# Patient Record
Sex: FEMALE | Race: WHITE | NOT HISPANIC OR LATINO | Employment: OTHER | URBAN - METROPOLITAN AREA
[De-identification: names, ages, dates, MRNs, and addresses within clinical notes are randomized per-mention and may not be internally consistent; named-entity substitution may affect disease eponyms.]

---

## 2017-05-22 ENCOUNTER — RECORDS - HEALTHEAST (OUTPATIENT)
Dept: LAB | Facility: CLINIC | Age: 62
End: 2017-05-22

## 2017-05-22 LAB
CHOLEST SERPL-MCNC: 234 MG/DL
FASTING STATUS PATIENT QL REPORTED: ABNORMAL
HDLC SERPL-MCNC: 49 MG/DL
LDLC SERPL CALC-MCNC: 148 MG/DL
TRIGL SERPL-MCNC: 187 MG/DL

## 2018-08-29 ENCOUNTER — RECORDS - HEALTHEAST (OUTPATIENT)
Dept: LAB | Facility: CLINIC | Age: 63
End: 2018-08-29

## 2018-08-29 LAB
ALBUMIN SERPL-MCNC: 3.2 G/DL (ref 3.5–5)
ALP SERPL-CCNC: 83 U/L (ref 45–120)
ALT SERPL W P-5'-P-CCNC: 16 U/L (ref 0–45)
ANION GAP SERPL CALCULATED.3IONS-SCNC: 7 MMOL/L (ref 5–18)
AST SERPL W P-5'-P-CCNC: 15 U/L (ref 0–40)
BILIRUB SERPL-MCNC: 0.3 MG/DL (ref 0–1)
BUN SERPL-MCNC: 18 MG/DL (ref 8–22)
CALCIUM SERPL-MCNC: 9.3 MG/DL (ref 8.5–10.5)
CHLORIDE BLD-SCNC: 110 MMOL/L (ref 98–107)
CHOLEST SERPL-MCNC: 215 MG/DL
CO2 SERPL-SCNC: 25 MMOL/L (ref 22–31)
CREAT SERPL-MCNC: 0.95 MG/DL (ref 0.6–1.1)
ERYTHROCYTE [DISTWIDTH] IN BLOOD BY AUTOMATED COUNT: 14.6 % (ref 11–14.5)
FASTING STATUS PATIENT QL REPORTED: ABNORMAL
GFR SERPL CREATININE-BSD FRML MDRD: 59 ML/MIN/1.73M2
GLUCOSE BLD-MCNC: 133 MG/DL (ref 70–125)
HCT VFR BLD AUTO: 41.9 % (ref 35–47)
HDLC SERPL-MCNC: 47 MG/DL
HGB BLD-MCNC: 13.8 G/DL (ref 12–16)
LDLC SERPL CALC-MCNC: 139 MG/DL
MCH RBC QN AUTO: 30 PG (ref 27–34)
MCHC RBC AUTO-ENTMCNC: 32.9 G/DL (ref 32–36)
MCV RBC AUTO: 91 FL (ref 80–100)
PLATELET # BLD AUTO: 341 THOU/UL (ref 140–440)
PMV BLD AUTO: 10.7 FL (ref 8.5–12.5)
POTASSIUM BLD-SCNC: 4 MMOL/L (ref 3.5–5)
PROT SERPL-MCNC: 6.4 G/DL (ref 6–8)
RBC # BLD AUTO: 4.6 MILL/UL (ref 3.8–5.4)
SODIUM SERPL-SCNC: 142 MMOL/L (ref 136–145)
TRIGL SERPL-MCNC: 143 MG/DL
TSH SERPL DL<=0.005 MIU/L-ACNC: 0.8 UIU/ML (ref 0.3–5)
WBC: 8.7 THOU/UL (ref 4–11)

## 2019-09-25 ENCOUNTER — RECORDS - HEALTHEAST (OUTPATIENT)
Dept: LAB | Facility: CLINIC | Age: 64
End: 2019-09-25

## 2019-09-25 LAB
ALBUMIN SERPL-MCNC: 3.2 G/DL (ref 3.5–5)
ALP SERPL-CCNC: 84 U/L (ref 45–120)
ALT SERPL W P-5'-P-CCNC: 22 U/L (ref 0–45)
ANION GAP SERPL CALCULATED.3IONS-SCNC: 7 MMOL/L (ref 5–18)
AST SERPL W P-5'-P-CCNC: 18 U/L (ref 0–40)
BILIRUB SERPL-MCNC: 0.3 MG/DL (ref 0–1)
BUN SERPL-MCNC: 18 MG/DL (ref 8–22)
CALCIUM SERPL-MCNC: 8.9 MG/DL (ref 8.5–10.5)
CHLORIDE BLD-SCNC: 109 MMOL/L (ref 98–107)
CHOLEST SERPL-MCNC: 212 MG/DL
CO2 SERPL-SCNC: 24 MMOL/L (ref 22–31)
CREAT SERPL-MCNC: 1.04 MG/DL (ref 0.6–1.1)
FASTING STATUS PATIENT QL REPORTED: ABNORMAL
GFR SERPL CREATININE-BSD FRML MDRD: 53 ML/MIN/1.73M2
GLUCOSE BLD-MCNC: 97 MG/DL (ref 70–125)
HDLC SERPL-MCNC: 55 MG/DL
LDLC SERPL CALC-MCNC: 130 MG/DL
POTASSIUM BLD-SCNC: 4 MMOL/L (ref 3.5–5)
PROT SERPL-MCNC: 6.2 G/DL (ref 6–8)
SODIUM SERPL-SCNC: 140 MMOL/L (ref 136–145)
TRIGL SERPL-MCNC: 137 MG/DL
TSH SERPL DL<=0.005 MIU/L-ACNC: 1.38 UIU/ML (ref 0.3–5)

## 2019-09-26 LAB — 25(OH)D3 SERPL-MCNC: 47.3 NG/ML (ref 30–80)

## 2020-11-19 ENCOUNTER — RECORDS - HEALTHEAST (OUTPATIENT)
Dept: LAB | Facility: CLINIC | Age: 65
End: 2020-11-19

## 2020-11-19 LAB
ALBUMIN SERPL-MCNC: 3.3 G/DL (ref 3.5–5)
ALP SERPL-CCNC: 73 U/L (ref 45–120)
ALT SERPL W P-5'-P-CCNC: 17 U/L (ref 0–45)
ANION GAP SERPL CALCULATED.3IONS-SCNC: 8 MMOL/L (ref 5–18)
AST SERPL W P-5'-P-CCNC: 17 U/L (ref 0–40)
BILIRUB SERPL-MCNC: 0.3 MG/DL (ref 0–1)
BUN SERPL-MCNC: 19 MG/DL (ref 8–22)
CALCIUM SERPL-MCNC: 8.6 MG/DL (ref 8.5–10.5)
CHLORIDE BLD-SCNC: 110 MMOL/L (ref 98–107)
CHOLEST SERPL-MCNC: 210 MG/DL
CO2 SERPL-SCNC: 24 MMOL/L (ref 22–31)
CREAT SERPL-MCNC: 1.15 MG/DL (ref 0.6–1.1)
ERYTHROCYTE [DISTWIDTH] IN BLOOD BY AUTOMATED COUNT: 19.2 % (ref 11–14.5)
FASTING STATUS PATIENT QL REPORTED: ABNORMAL
GFR SERPL CREATININE-BSD FRML MDRD: 47 ML/MIN/1.73M2
GLUCOSE BLD-MCNC: 98 MG/DL (ref 70–125)
HCT VFR BLD AUTO: 30.5 % (ref 35–47)
HDLC SERPL-MCNC: 55 MG/DL
HGB BLD-MCNC: 8.6 G/DL (ref 12–16)
LDLC SERPL CALC-MCNC: 128 MG/DL
MCH RBC QN AUTO: 20.8 PG (ref 27–34)
MCHC RBC AUTO-ENTMCNC: 28.2 G/DL (ref 32–36)
MCV RBC AUTO: 74 FL (ref 80–100)
PLATELET # BLD AUTO: 520 THOU/UL (ref 140–440)
PMV BLD AUTO: 10 FL (ref 8.5–12.5)
POTASSIUM BLD-SCNC: 4 MMOL/L (ref 3.5–5)
PROT SERPL-MCNC: 6.5 G/DL (ref 6–8)
RBC # BLD AUTO: 4.14 MILL/UL (ref 3.8–5.4)
SODIUM SERPL-SCNC: 142 MMOL/L (ref 136–145)
T4 FREE SERPL-MCNC: 1.1 NG/DL (ref 0.7–1.8)
TRIGL SERPL-MCNC: 135 MG/DL
TSH SERPL DL<=0.005 MIU/L-ACNC: 0.21 UIU/ML (ref 0.3–5)
WBC: 10.1 THOU/UL (ref 4–11)

## 2020-11-27 ENCOUNTER — RECORDS - HEALTHEAST (OUTPATIENT)
Dept: LAB | Facility: CLINIC | Age: 65
End: 2020-11-27

## 2020-11-27 LAB
FERRITIN SERPL-MCNC: 3 NG/ML (ref 10–130)
IRON SATN MFR SERPL: 3 % (ref 20–50)
IRON SERPL-MCNC: 14 UG/DL (ref 42–175)
RETICS # AUTO: 0.07 MILL/UL (ref 0.01–0.11)
RETICS/RBC NFR AUTO: 1.74 % (ref 0.8–2.7)
TIBC SERPL-MCNC: 436 UG/DL (ref 313–563)
TRANSFERRIN SERPL-MCNC: 349 MG/DL (ref 212–360)
VIT B12 SERPL-MCNC: 253 PG/ML (ref 213–816)

## 2020-11-28 LAB
BASOPHILS # BLD AUTO: 0.1 THOU/UL (ref 0–0.2)
BASOPHILS NFR BLD AUTO: 1 % (ref 0–2)
EOSINOPHIL COUNT (ABSOLUTE): 0.1 THOU/UL (ref 0–0.4)
EOSINOPHIL NFR BLD AUTO: 1 % (ref 0–6)
ERYTHROCYTE [DISTWIDTH] IN BLOOD BY AUTOMATED COUNT: 18.6 % (ref 11–14.5)
HCT VFR BLD AUTO: 29.4 % (ref 35–47)
HGB BLD-MCNC: 8.6 G/DL (ref 12–16)
IMMATURE GRANULOCYTE % - MAN (DIFF): 0 %
IMMATURE GRANULOCYTE ABSOLUTE - MAN (DIFF): 0 THOU/UL
LYMPHOCYTES # BLD AUTO: 3.4 THOU/UL (ref 0.8–4.4)
LYMPHOCYTES NFR BLD AUTO: 34 % (ref 20–40)
MCH RBC QN AUTO: 21 PG (ref 27–34)
MCHC RBC AUTO-ENTMCNC: 29.3 G/DL (ref 32–36)
MCV RBC AUTO: 72 FL (ref 80–100)
MONOCYTES # BLD AUTO: 0.7 THOU/UL (ref 0–0.9)
MONOCYTES NFR BLD AUTO: 7 % (ref 2–10)
OVALOCYTES: ABNORMAL
PATH REPORT.MICROSCOPIC SPEC OTHER STN: ABNORMAL
PLAT MORPH BLD: ABNORMAL
PLATELET # BLD AUTO: 484 THOU/UL (ref 140–440)
PMV BLD AUTO: 10 FL (ref 8.5–12.5)
POLYCHROMASIA BLD QL SMEAR: ABNORMAL
RBC # BLD AUTO: 4.09 MILL/UL (ref 3.8–5.4)
TOTAL NEUTROPHILS-ABS(DIFF): 5.7 THOU/UL (ref 2–7.7)
TOTAL NEUTROPHILS-REL(DIFF): 57 % (ref 50–70)
WBC: 10 THOU/UL (ref 4–11)

## 2020-11-30 LAB
LAB AP CHARGES (HE HISTORICAL CONVERSION): NORMAL
PATH REPORT.COMMENTS IMP SPEC: NORMAL
PATH REPORT.COMMENTS IMP SPEC: NORMAL
PATH REPORT.FINAL DX SPEC: NORMAL
PATH REPORT.MICROSCOPIC SPEC OTHER STN: NORMAL
PATH REPORT.RELEVANT HX SPEC: NORMAL

## 2020-12-22 ENCOUNTER — RECORDS - HEALTHEAST (OUTPATIENT)
Dept: LAB | Facility: CLINIC | Age: 65
End: 2020-12-22

## 2020-12-22 LAB
ALBUMIN SERPL-MCNC: 3.4 G/DL (ref 3.5–5)
ALP SERPL-CCNC: 75 U/L (ref 45–120)
ALT SERPL W P-5'-P-CCNC: 16 U/L (ref 0–45)
ANION GAP SERPL CALCULATED.3IONS-SCNC: 10 MMOL/L (ref 5–18)
AST SERPL W P-5'-P-CCNC: 17 U/L (ref 0–40)
BILIRUB SERPL-MCNC: 0.4 MG/DL (ref 0–1)
BUN SERPL-MCNC: 18 MG/DL (ref 8–22)
CALCIUM SERPL-MCNC: 9 MG/DL (ref 8.5–10.5)
CHLORIDE BLD-SCNC: 108 MMOL/L (ref 98–107)
CO2 SERPL-SCNC: 23 MMOL/L (ref 22–31)
CREAT SERPL-MCNC: 1.21 MG/DL (ref 0.6–1.1)
ERYTHROCYTE [DISTWIDTH] IN BLOOD BY AUTOMATED COUNT: 25.4 % (ref 11–14.5)
GFR SERPL CREATININE-BSD FRML MDRD: 45 ML/MIN/1.73M2
GLUCOSE BLD-MCNC: 86 MG/DL (ref 70–125)
HCT VFR BLD AUTO: 33.9 % (ref 35–47)
HGB BLD-MCNC: 9.5 G/DL (ref 12–16)
MCH RBC QN AUTO: 21.2 PG (ref 27–34)
MCHC RBC AUTO-ENTMCNC: 28 G/DL (ref 32–36)
MCV RBC AUTO: 76 FL (ref 80–100)
PLATELET # BLD AUTO: 404 THOU/UL (ref 140–440)
PMV BLD AUTO: 10.5 FL (ref 8.5–12.5)
POTASSIUM BLD-SCNC: 4.2 MMOL/L (ref 3.5–5)
PROT SERPL-MCNC: 6.7 G/DL (ref 6–8)
RBC # BLD AUTO: 4.48 MILL/UL (ref 3.8–5.4)
SODIUM SERPL-SCNC: 141 MMOL/L (ref 136–145)
TSH SERPL DL<=0.005 MIU/L-ACNC: 2.49 UIU/ML (ref 0.3–5)
WBC: 9.2 THOU/UL (ref 4–11)

## 2021-01-07 ENCOUNTER — RECORDS - HEALTHEAST (OUTPATIENT)
Dept: LAB | Facility: CLINIC | Age: 66
End: 2021-01-07

## 2021-01-07 LAB
ALBUMIN SERPL-MCNC: 3.6 G/DL (ref 3.5–5)
ALP SERPL-CCNC: 82 U/L (ref 45–120)
ALT SERPL W P-5'-P-CCNC: 21 U/L (ref 0–45)
ANION GAP SERPL CALCULATED.3IONS-SCNC: 8 MMOL/L (ref 5–18)
AST SERPL W P-5'-P-CCNC: 21 U/L (ref 0–40)
BASOPHILS # BLD AUTO: 0.1 THOU/UL (ref 0–0.2)
BASOPHILS NFR BLD AUTO: 1 % (ref 0–2)
BILIRUB SERPL-MCNC: 0.3 MG/DL (ref 0–1)
BUN SERPL-MCNC: 19 MG/DL (ref 8–22)
CALCIUM SERPL-MCNC: 9.1 MG/DL (ref 8.5–10.5)
CHLORIDE BLD-SCNC: 109 MMOL/L (ref 98–107)
CO2 SERPL-SCNC: 26 MMOL/L (ref 22–31)
CREAT SERPL-MCNC: 1.17 MG/DL (ref 0.6–1.1)
EOSINOPHIL # BLD AUTO: 0.4 THOU/UL (ref 0–0.4)
EOSINOPHIL NFR BLD AUTO: 5 % (ref 0–6)
ERYTHROCYTE [DISTWIDTH] IN BLOOD BY AUTOMATED COUNT: ABNORMAL %
GFR SERPL CREATININE-BSD FRML MDRD: 46 ML/MIN/1.73M2
GLUCOSE BLD-MCNC: 95 MG/DL (ref 70–125)
HCT VFR BLD AUTO: 43.6 % (ref 35–47)
HGB BLD-MCNC: 12.5 G/DL (ref 12–16)
IMM GRANULOCYTES # BLD: 0 THOU/UL
IMM GRANULOCYTES NFR BLD: 0 %
LYMPHOCYTES # BLD AUTO: 3.2 THOU/UL (ref 0.8–4.4)
LYMPHOCYTES NFR BLD AUTO: 41 % (ref 20–40)
MCH RBC QN AUTO: 23.1 PG (ref 27–34)
MCHC RBC AUTO-ENTMCNC: 28.7 G/DL (ref 32–36)
MCV RBC AUTO: 81 FL (ref 80–100)
MONOCYTES # BLD AUTO: 0.6 THOU/UL (ref 0–0.9)
MONOCYTES NFR BLD AUTO: 7 % (ref 2–10)
NEUTROPHILS # BLD AUTO: 3.5 THOU/UL (ref 2–7.7)
NEUTROPHILS NFR BLD AUTO: 46 % (ref 50–70)
OVALOCYTES: ABNORMAL
PLAT MORPH BLD: NORMAL
PLATELET # BLD AUTO: 397 THOU/UL (ref 140–440)
PMV BLD AUTO: 10.7 FL (ref 8.5–12.5)
POLYCHROMASIA BLD QL SMEAR: ABNORMAL
POTASSIUM BLD-SCNC: 4.1 MMOL/L (ref 3.5–5)
PROT SERPL-MCNC: 7 G/DL (ref 6–8)
RBC # BLD AUTO: 5.41 MILL/UL (ref 3.8–5.4)
SODIUM SERPL-SCNC: 143 MMOL/L (ref 136–145)
WBC: 7.7 THOU/UL (ref 4–11)

## 2021-02-03 ENCOUNTER — RECORDS - HEALTHEAST (OUTPATIENT)
Dept: LAB | Facility: CLINIC | Age: 66
End: 2021-02-03

## 2021-02-03 ENCOUNTER — TRANSFERRED RECORDS (OUTPATIENT)
Dept: HEALTH INFORMATION MANAGEMENT | Facility: CLINIC | Age: 66
End: 2021-02-03

## 2021-02-03 LAB
C REACTIVE PROTEIN LHE: 2.1 MG/DL (ref 0–0.8)
ERYTHROCYTE [SEDIMENTATION RATE] IN BLOOD BY WESTERGREN METHOD: 13 MM/HR (ref 0–20)

## 2021-02-06 ENCOUNTER — APPOINTMENT (OUTPATIENT)
Dept: ULTRASOUND IMAGING | Facility: CLINIC | Age: 66
DRG: 243 | End: 2021-02-06
Attending: INTERNAL MEDICINE
Payer: COMMERCIAL

## 2021-02-06 ENCOUNTER — COMMUNICATION - HEALTHEAST (OUTPATIENT)
Dept: SCHEDULING | Facility: CLINIC | Age: 66
End: 2021-02-06

## 2021-02-06 ENCOUNTER — APPOINTMENT (OUTPATIENT)
Dept: CARDIOLOGY | Facility: CLINIC | Age: 66
DRG: 243 | End: 2021-02-06
Attending: INTERNAL MEDICINE
Payer: COMMERCIAL

## 2021-02-06 ENCOUNTER — HOSPITAL ENCOUNTER (INPATIENT)
Facility: CLINIC | Age: 66
LOS: 2 days | Discharge: HOME | DRG: 243 | End: 2021-02-09
Attending: INTERNAL MEDICINE | Admitting: INTERNAL MEDICINE
Payer: COMMERCIAL

## 2021-02-06 DIAGNOSIS — I10 HYPERTENSION, UNSPECIFIED TYPE: ICD-10-CM

## 2021-02-06 DIAGNOSIS — R00.1 BRADYCARDIA: Primary | ICD-10-CM

## 2021-02-06 LAB
ANION GAP SERPL CALCULATED.3IONS-SCNC: 7 MMOL/L (ref 3–14)
BUN SERPL-MCNC: 16 MG/DL (ref 7–30)
CALCIUM SERPL-MCNC: 9.1 MG/DL (ref 8.5–10.1)
CHLORIDE SERPL-SCNC: 111 MMOL/L (ref 94–109)
CO2 SERPL-SCNC: 24 MMOL/L (ref 20–32)
CREAT SERPL-MCNC: 1.1 MG/DL (ref 0.52–1.04)
ERYTHROCYTE [DISTWIDTH] IN BLOOD BY AUTOMATED COUNT: 24 % (ref 10–15)
GFR SERPL CREATININE-BSD FRML MDRD: 52 ML/MIN/{1.73_M2}
GLUCOSE SERPL-MCNC: 106 MG/DL (ref 70–99)
HCT VFR BLD AUTO: 43.9 % (ref 35–47)
HGB BLD-MCNC: 13.6 G/DL (ref 11.7–15.7)
MAGNESIUM SERPL-MCNC: 2.6 MG/DL (ref 1.6–2.3)
MCH RBC QN AUTO: 25.6 PG (ref 26.5–33)
MCHC RBC AUTO-ENTMCNC: 31 G/DL (ref 31.5–36.5)
MCV RBC AUTO: 83 FL (ref 78–100)
PLATELET # BLD AUTO: 383 10E9/L (ref 150–450)
POTASSIUM SERPL-SCNC: 4 MMOL/L (ref 3.4–5.3)
RBC # BLD AUTO: 5.32 10E12/L (ref 3.8–5.2)
SODIUM SERPL-SCNC: 142 MMOL/L (ref 133–144)
T4 FREE SERPL-MCNC: 0.79 NG/DL (ref 0.76–1.46)
TSH SERPL DL<=0.005 MIU/L-ACNC: 4.73 MU/L (ref 0.4–4)
WBC # BLD AUTO: 10.9 10E9/L (ref 4–11)

## 2021-02-06 PROCEDURE — 85027 COMPLETE CBC AUTOMATED: CPT | Performed by: INTERNAL MEDICINE

## 2021-02-06 PROCEDURE — 36415 COLL VENOUS BLD VENIPUNCTURE: CPT | Performed by: INTERNAL MEDICINE

## 2021-02-06 PROCEDURE — G0378 HOSPITAL OBSERVATION PER HR: HCPCS

## 2021-02-06 PROCEDURE — 99222 1ST HOSP IP/OBS MODERATE 55: CPT | Mod: 25 | Performed by: INTERNAL MEDICINE

## 2021-02-06 PROCEDURE — 93010 ELECTROCARDIOGRAM REPORT: CPT | Performed by: INTERNAL MEDICINE

## 2021-02-06 PROCEDURE — 255N000002 HC RX 255 OP 636: Performed by: INTERNAL MEDICINE

## 2021-02-06 PROCEDURE — 250N000013 HC RX MED GY IP 250 OP 250 PS 637: Performed by: INTERNAL MEDICINE

## 2021-02-06 PROCEDURE — 999N000208 ECHOCARDIOGRAM COMPLETE

## 2021-02-06 PROCEDURE — 99220 PR INITIAL OBSERVATION CARE,LEVEL III: CPT | Performed by: INTERNAL MEDICINE

## 2021-02-06 PROCEDURE — 93975 VASCULAR STUDY: CPT

## 2021-02-06 PROCEDURE — 93005 ELECTROCARDIOGRAM TRACING: CPT

## 2021-02-06 PROCEDURE — 99207 PR NO CHARGE LOS: CPT | Performed by: INTERNAL MEDICINE

## 2021-02-06 PROCEDURE — 250N000011 HC RX IP 250 OP 636: Performed by: INTERNAL MEDICINE

## 2021-02-06 PROCEDURE — 80048 BASIC METABOLIC PNL TOTAL CA: CPT | Performed by: INTERNAL MEDICINE

## 2021-02-06 PROCEDURE — 84443 ASSAY THYROID STIM HORMONE: CPT | Performed by: INTERNAL MEDICINE

## 2021-02-06 PROCEDURE — 83735 ASSAY OF MAGNESIUM: CPT | Performed by: INTERNAL MEDICINE

## 2021-02-06 PROCEDURE — 93306 TTE W/DOPPLER COMPLETE: CPT | Mod: 26 | Performed by: INTERNAL MEDICINE

## 2021-02-06 PROCEDURE — 96374 THER/PROPH/DIAG INJ IV PUSH: CPT

## 2021-02-06 PROCEDURE — 84439 ASSAY OF FREE THYROXINE: CPT | Performed by: INTERNAL MEDICINE

## 2021-02-06 PROCEDURE — 96376 TX/PRO/DX INJ SAME DRUG ADON: CPT

## 2021-02-06 RX ORDER — NEOMYCIN/BACITRACIN/POLYMYXINB 3.5-400-5K
OINTMENT (GRAM) TOPICAL DAILY PRN
COMMUNITY
End: 2022-08-05

## 2021-02-06 RX ORDER — CHOLECALCIFEROL (VITAMIN D3) 50 MCG
1 TABLET ORAL DAILY
COMMUNITY

## 2021-02-06 RX ORDER — LANOLIN ALCOHOL/MO/W.PET/CERES
3 CREAM (GRAM) TOPICAL
Status: DISCONTINUED | OUTPATIENT
Start: 2021-02-06 | End: 2021-02-09 | Stop reason: HOSPADM

## 2021-02-06 RX ORDER — ONDANSETRON 2 MG/ML
4 INJECTION INTRAMUSCULAR; INTRAVENOUS EVERY 6 HOURS PRN
Status: DISCONTINUED | OUTPATIENT
Start: 2021-02-06 | End: 2021-02-09 | Stop reason: HOSPADM

## 2021-02-06 RX ORDER — OMEPRAZOLE 40 MG/1
40 CAPSULE, DELAYED RELEASE ORAL DAILY
COMMUNITY
End: 2023-02-21

## 2021-02-06 RX ORDER — HYDRALAZINE HYDROCHLORIDE 20 MG/ML
20 INJECTION INTRAMUSCULAR; INTRAVENOUS EVERY 4 HOURS PRN
Status: DISCONTINUED | OUTPATIENT
Start: 2021-02-06 | End: 2021-02-07

## 2021-02-06 RX ORDER — PROCHLORPERAZINE 25 MG
12.5 SUPPOSITORY, RECTAL RECTAL EVERY 12 HOURS PRN
Status: DISCONTINUED | OUTPATIENT
Start: 2021-02-06 | End: 2021-02-09 | Stop reason: HOSPADM

## 2021-02-06 RX ORDER — HYDROMORPHONE HYDROCHLORIDE 1 MG/ML
0.3 INJECTION, SOLUTION INTRAMUSCULAR; INTRAVENOUS; SUBCUTANEOUS
Status: DISCONTINUED | OUTPATIENT
Start: 2021-02-06 | End: 2021-02-09 | Stop reason: HOSPADM

## 2021-02-06 RX ORDER — LOSARTAN POTASSIUM 25 MG/1
25 TABLET ORAL DAILY
Status: ON HOLD | COMMUNITY
End: 2021-02-09

## 2021-02-06 RX ORDER — ACETAMINOPHEN 650 MG/1
650 SUPPOSITORY RECTAL EVERY 4 HOURS PRN
Status: DISCONTINUED | OUTPATIENT
Start: 2021-02-06 | End: 2021-02-09 | Stop reason: HOSPADM

## 2021-02-06 RX ORDER — CHLORHEXIDINE GLUCONATE 40 MG/ML
SOLUTION TOPICAL DAILY
Status: COMPLETED | OUTPATIENT
Start: 2021-02-07 | End: 2021-02-08

## 2021-02-06 RX ORDER — NALOXONE HYDROCHLORIDE 0.4 MG/ML
0.2 INJECTION, SOLUTION INTRAMUSCULAR; INTRAVENOUS; SUBCUTANEOUS
Status: DISCONTINUED | OUTPATIENT
Start: 2021-02-06 | End: 2021-02-09 | Stop reason: HOSPADM

## 2021-02-06 RX ORDER — HYDROCODONE BITARTRATE AND ACETAMINOPHEN 5; 325 MG/1; MG/1
1-2 TABLET ORAL EVERY 4 HOURS PRN
Status: DISCONTINUED | OUTPATIENT
Start: 2021-02-06 | End: 2021-02-08

## 2021-02-06 RX ORDER — LOSARTAN POTASSIUM 25 MG/1
25 TABLET ORAL DAILY
Status: DISCONTINUED | OUTPATIENT
Start: 2021-02-06 | End: 2021-02-07

## 2021-02-06 RX ORDER — ACETAMINOPHEN 325 MG/1
650 TABLET ORAL EVERY 4 HOURS PRN
Status: DISCONTINUED | OUTPATIENT
Start: 2021-02-06 | End: 2021-02-09 | Stop reason: HOSPADM

## 2021-02-06 RX ORDER — NALOXONE HYDROCHLORIDE 0.4 MG/ML
0.4 INJECTION, SOLUTION INTRAMUSCULAR; INTRAVENOUS; SUBCUTANEOUS
Status: DISCONTINUED | OUTPATIENT
Start: 2021-02-06 | End: 2021-02-09 | Stop reason: HOSPADM

## 2021-02-06 RX ORDER — ONDANSETRON 4 MG/1
4 TABLET, ORALLY DISINTEGRATING ORAL EVERY 6 HOURS PRN
Status: DISCONTINUED | OUTPATIENT
Start: 2021-02-06 | End: 2021-02-09 | Stop reason: HOSPADM

## 2021-02-06 RX ORDER — PROCHLORPERAZINE MALEATE 5 MG
5 TABLET ORAL EVERY 6 HOURS PRN
Status: DISCONTINUED | OUTPATIENT
Start: 2021-02-06 | End: 2021-02-09 | Stop reason: HOSPADM

## 2021-02-06 RX ORDER — LEVOTHYROXINE SODIUM 50 UG/1
75 TABLET ORAL DAILY
COMMUNITY

## 2021-02-06 RX ADMIN — OMEPRAZOLE 40 MG: 20 CAPSULE, DELAYED RELEASE ORAL at 15:51

## 2021-02-06 RX ADMIN — HUMAN ALBUMIN MICROSPHERES AND PERFLUTREN 9 ML: 10; .22 INJECTION, SOLUTION INTRAVENOUS at 08:30

## 2021-02-06 RX ADMIN — HYDRALAZINE HYDROCHLORIDE 20 MG: 20 INJECTION INTRAMUSCULAR; INTRAVENOUS at 10:31

## 2021-02-06 RX ADMIN — LOSARTAN POTASSIUM 25 MG: 25 TABLET, FILM COATED ORAL at 15:52

## 2021-02-06 RX ADMIN — HYDRALAZINE HYDROCHLORIDE 20 MG: 20 INJECTION INTRAMUSCULAR; INTRAVENOUS at 03:18

## 2021-02-06 RX ADMIN — ONDANSETRON 4 MG: 4 TABLET, ORALLY DISINTEGRATING ORAL at 11:54

## 2021-02-06 RX ADMIN — ACETAMINOPHEN 650 MG: 325 TABLET, FILM COATED ORAL at 11:51

## 2021-02-06 ASSESSMENT — ACTIVITIES OF DAILY LIVING (ADL)
DIFFICULTY_EATING/SWALLOWING: NO
DRESSING/BATHING_DIFFICULTY: NO
DOING_ERRANDS_INDEPENDENTLY_DIFFICULTY: NO
TOILETING_ISSUES: NO
CONCENTRATING,_REMEMBERING_OR_MAKING_DECISIONS_DIFFICULTY: NO
FALL_HISTORY_WITHIN_LAST_SIX_MONTHS: NO
WALKING_OR_CLIMBING_STAIRS_DIFFICULTY: NO
DIFFICULTY_COMMUNICATING: NO

## 2021-02-06 NOTE — PLAN OF CARE
Tameka is alert, oriented, very pleasant. She arrived to [facility] from [facility] ED via EMS overnight. She reports her headache has improved. BP high at admission, received PRN Hydralazine with good results.  Asymptomatic bradycardia with rates in the 30s-40s. Tele looks like 2:1 second degree AVB, type 2.  NPO for echo and cardiology/EP consult.

## 2021-02-06 NOTE — LETTER
Transition Communication Hand-off for Care Transitions to Next Level of Care Provider    Name: Tameka Avalos  : 1955  MRN #: [id number]  Primary Care Provider: Eve Brewer     Primary Clinic: [facility] [address]  [place] [address]     Reason for Hospitalization:  Bradycardia [R00.1]  Admit Date/Time: 2021  2:21 AM  Discharge Date: 2021    Payor Source: Payor: [facility] / Plan: [facility] MEDICARE NON [employer] / Product Type: HMO /         Reason for Communication Hand-off Referral: Avoidable readmission within 30 days    Discharge Plan: home.     Follow-up plan:    Future Appointments   Date Time Provider Department Center   2/15/2021  2:00 PM [facility]     Patient had permanent pacemaker placed.    Jennifer Ochoa RN    AVS/Discharge Summary is the source of truth; this is a helpful guide for improved communication of patient story

## 2021-02-06 NOTE — H&P
[facility]  History and Physical  Hospitalist       Date of Admission:  2/6/2021    Assessment & Plan   Tameka Avalos is a very pleasant 65 year old female with hypertension, hypothyroidism, GERD who was transferred from [facility] Emergency Department on 2/6/2021 with bradycardia and headache.    Bradycardia with block, prolonged QT  Appears asymptomatic without any recent syncope or falls. She tolerates water aerobics 4 times per week, sometimes even 2 classes in a single day. Heart rate 29-38 with several different rhythm variations noted at referring ED: 1st degree Wenckebach, sinus rhythm with prolonged QT at 562.  -admit to observation with telemetry  -12 lead EKG stat on admission  -EP cardiology consultation  -TTE  -atropine prn for HR<30 and symptomatic  -pacer pads available  -NO beta blockers or calcium channel blockers  -give magnesium sulfate 2 grams now    Headache  Accelerated Hypertension  Blood pressure range noted from 92 systolic to 270 with a associated headache. Hypertension managed PTA with losartan only. With wide range of pressures ? If something other than essential hypertension such as renal artery stenosis, pheochromocytoma, etc. CTA head showed no acute intracranial process.   -prn hydralazine  -renal US later today to evaluate for renal artery stenosis  -check BMP    Hypothyroidism  Managed PTA with levothyroxine. If profoundly hypothyroid, could be contributing to bradycardia.   -check TSH  -resume levothyroxine once verified    Chronic, stable problems:  GERD: Resume PTA PPI    Asymptomatic Rule Out of COVID-19 infection => NEGATIVE  This patient was evaluated during a global COVID-19 pandemic, which necessitated consideration that the patient might be at risk for infection with the SARS-CoV-2 virus that causes COVID-19. Applicable protocols for evaluation were followed during the patient's care. Low suspicion for infection.   -follow-up COVID-19 PCR test  "result    DVT prophylaxis: Pneumatic Compression Devices and Ambulate every shift  Code Status: Full     Disposition: Expected discharge in 1-2 days pending cardiology recommendations.    Zhane Zee MD  Text Page    ~~~~~~~~~~~~~~~~~~~~~~~~~~~~~~~~~~~~~~~~~~~~~~~~~~~~~  Primary Care Physician   No primary care provider on file.    Chief Complaint   headache     History is obtained from the patient and medical records.    History of Present Illness   Tameka Avalos is a very pleasant 65 year old female with hypertension, hypothyroidism, GERD who presented to an outside ED with headache. She had been suffering for 3 weeks with off & on, 8/10 intensity, associated with nausea. Attempted to treat with acetaminophen which really didn't help. Also tried taking omeprazole to see if her nausea would improve and it did not. Saw her primary clinic and was given prescription for sumatriptan which she tried 3 times but it did not help. Made an appointment with her ophthalmologist to see if there was an eye problem causing her headaches but she reports that exam was \"fine\". Reports she knows she has a heart murmur \"for a long time\" but has never had an echocardiogram. She has hypothyroidism and had her dose decreased in the past year by her primary physician.     On presentation to the ED she had systolic  and was given hydralazine then pressure went to 93 systolic. Headache improved. While on telemetry it was noted that her heart rate ranged from 30-40, during which time she felt nauseated. Several EKGs were obtained which reportedly showed 1st degree Wenckebach, sinus rhythm with prolonged QT at 562. No atropine was needed for treatment. Her only blood pressure medication is losartan and she has not unintentionally taken any beta blocker or calcium channel blocker. Denies fever, chills, hot flashes, shortness of breath, chest pain, palpitations, blurry vision, abdominal pain or flank tenderness, dysuria, " hematuria, constipation or diarrhea.     Case reviewed with Dr. Laird from the [facility] Emergency Department. Labs and available imaging reviewed. Pertinent results include: MULTIPLE EKGs. Gave dilaudid, mag sufate, zofran, IV fluid. [facility] hospitalist declined admission due to his opinion that the patient would require an ICU bed, though patient suitable for medical telemetry bed here.     Past Medical History    I have reviewed this patient's medical history and updated it with pertinent information if needed.   Hypertension  Hypothyroidism  GERD    Past Surgical History   I have reviewed this patient's surgical history and updated it with pertinent information if needed.    Prior to Admission Medications   None   losartan  Synthroid    Allergies   Not on File    Social History   I have reviewed this patient's social history and updated it with pertinent information if needed. Tameka Avalos  is retired and spends Wednesdays with her grandchildren. She is a lifelong nonsmoker and denies alcohol consumption. Does water aerobics 4 times per week.    Family History   I have reviewed this patient's family history and updated it with pertinent information if needed.   Father and mother still alive in their 90s. Dad had pacemaker placed but patient doesn't know what it was for.   No 1st degree relatives with kidney problems.     Review of Systems   The 10 point Review of Systems is negative other than noted in the HPI or here.    Physical Exam       BP: (!) 196/77 Pulse: (!) 38     SpO2: 97 % O2 Device: Nasal cannula Oxygen Delivery: 2 LPM  Vital Signs with Ranges  Pulse:  [38-41] 38  BP: (196-199)/(77-82) 196/77  SpO2:  [97 %] 97 %  0 lbs 0 oz    Constitutional: Alert, NAD, pleasant and interactive  Eyes: PERRL, sclerae anicteric  HEENT: mmm, atraumatic  To tenderness to palpation of temple areas  Respiratory: Lungs CTAB, no wheezes or crackles  Cardiovascular: bradycardic, +systolic murmur  no LE edema  GI:  soft, non-tender, nondistended  Skin/Integument: nilda complexion, warm, dry, no acute rashes  Genitourinary: not examined  Musc: FONTAINE, normal limb strength x 4  Neuro: AOx3, no focal deficits, no tremors  Psych: not anxious, not confused      Data   Data reviewed today:  I personally reviewed: 12 lead EKGs with 1st degree Wenckebach, sinus rhythm with prolonged QT at 562. Repeat EKG here with second degree AV block.  Recent Labs   Lab 02/06/21  0329   WBC 10.9   HGB 13.6   MCV 83         POTASSIUM 4.0   CHLORIDE 111*   CO2 24   BUN 16   CR 1.10*   ANIONGAP 7   BISI 9.1   *       Imaging:  No results found for this or any previous visit (from the past 24 hour(s)).

## 2021-02-06 NOTE — CONSULTS
[facility]    Cardiology Consultation     Date of Admission:  2/6/2021  Reason for consult: AV block    Assessment & Plan     1.  2-1 AV block  2.  Hypertensive urgency  3.  Hypothyroidism  4.  Essential hypertension    Plan:  1.  Keep Zoll at bedside over weekend and plan for dual-chamber pacemaker (right-handed so on the left side) on Monday.  N.p.o. Sunday after midnight.  Stable here without any syncope and no pauses on telemetry so I do not think temporary pacing is needed.  Discussed the procedures and associated risks and benefits with the patient and her , which include bleeding, hematoma, infection, pneumothorax, and less likely cardiac perforation or tamponade.  She is agreeable to proceeding.  No reversible causes identified as she has not been on AV yazmin blockers and I think a vagal from nausea/headache is very unlikely.  2.  Hibiclens bath  3.  Continue losartan and agree with secondary causes of hypertension work-up including renal ultrasound given systolic blood pressure of 270 at outside ED  4.  No QT prolongation and I suspect outside report of QT prolongation was P wave at the end of the way falsely prolonging QT.    We will continue to follow.    Víctor Kelly MD    History of Present Illness   Tameak Avalos is a 65 year old female who is admitted for AV block.  Patient has history of hypothyroidism, hypertension on losartan but no other cardiac issues.  She presented to the ED with a few weeks of headache/nausea in addition to dizziness and nausea the day of presentations.  She took omeprazole for the nausea and sumatriptan/Tylenol for the headache without relief.  She eventually presented at [facility] ED where she had a systolic blood pressure of 270 mmHg and was treated with hydralazine.  He was noted that she was bradycardic with heart rates between 30 and 40 and ECG reportedly showed first-degree Wenckebach and prolonged QT at 562.  She has not  been on any beta-blocker or calcium channel blocker.  She has denied chest pain, shortness of breath, dizziness lightheadedness syncopal events, he is pretty active at baseline and was planning for travel at the end of February in [place].  She was transferred to [facility] given recommendation of the local hospitalist for ICU bed however she has been admitted to telemetry because she has been stable here.  ECG here showed sinus rhythm with 2-1 AV block, short GA interval and narrow QRS.  P waves at the end of the T waves which may have artificially prolonged the QT.  She has not had any pauses or asystole.  Transthoracic echo has been performed and showed normal ejection fraction and no significant valve disease despite impressive murmur on exam.  TSH has been 4.7 and T4 normal.    Past Medical History   No past medical history on file.     Past Surgical History   No past surgical history on file.     Prior to Admission Medications   Prior to Admission Medications   Prescriptions Last Dose Informant Patient Reported? Taking?   ferrous sulfate 140 (45 Fe) MG TBCR CR tablet 2/5/2021 at pm  Yes Yes   Sig: Take 140 mg by mouth 2 times daily    levothyroxine (SYNTHROID/LEVOTHROID) 50 MCG tablet 2/5/2021 at am  Yes Yes   Sig: Take 50 mcg by mouth daily   losartan (COZAAR) 25 MG tablet 2/5/2021 at am  Yes Yes   Sig: Take 25 mg by mouth daily   neomycin-bacitracin-polymyxin (NEOSPORIN) 5-400-5000 ointment prn at prn  Yes Yes   Sig: Apply topically daily as needed    omeprazole (PRILOSEC) 40 MG DR capsule 2/5/2021 at am  Yes Yes   Sig: Take 40 mg by mouth daily   vitamin D3 (CHOLECALCIFEROL) 50 mcg (2000 units) tablet 2/5/2021 at am  Yes Yes   Sig: Take 1 tablet by mouth daily      Facility-Administered Medications: None     Allergies   No Known Allergies    Social History   I have reviewed this patient's social history and updated it with pertinent information if needed. Tameka Avalos      Family History   I have reviewed  this patient's family history and updated it with pertinent information if needed.   No family history on file.    Code Status    Full Code    Review of Systems   12 point review of systems reviewed and as noted in History of Present Illness    Physical Exam   Temp: 98.5  F (36.9  C)   BP: 137/59 Pulse: (!) 41   Resp: 18 SpO2: 96 % O2 Device: Nasal cannula Oxygen Delivery: 2 LPM  Vital Signs with Ranges  Temp:  [98.5  F (36.9  C)-98.7  F (37.1  C)] 98.5  F (36.9  C)  Pulse:  [38-45] 41  Resp:  [18] 18  BP: (135-203)/(59-82) 137/59  SpO2:  [96 %-97 %] 96 %  172 lbs 14.4 oz    Constitutional: Awake, alert, cooperative, no apparent distress.  Respiratory: Clear to auscultation bilaterally, no crackles or wheezing.  Neck: No JVD  Cardiovascular: Regular rate and rhythm, bradycardia normal S1 and S2, and 3 out of 6 systolic murmur throughout the precordium  Skin: No rashes, no cyanosis  Psychiatric: Alert, oriented to person, place and time, no obvious anxiety or depression.   Extremities: No lower or upper extremity edema, no clubbing or cyanosis  Vascular: Radial pulses 4+ and symmetric bilaterally    CARDIOLOGY TESTING AND IMAGING:    Transthoracic echocardiogram:   Interpretation Summary     Possible advanced AV block.  Left ventricular systolic function is normal.  The visual ejection fraction is estimated at 60-65%.  The left ventricle is normal in size.  The right ventricle is normal in structure, function and size.  There is trace to mild mitral regurgitation.     No old studies available for comparison.    Data   Most Recent 3 CBC's:  Recent Labs   Lab Test 02/06/21  0329   WBC 10.9   HGB 13.6   MCV 83        Most Recent 3 BMP's:  Recent Labs   Lab Test 02/06/21  0329      POTASSIUM 4.0   CHLORIDE 111*   CO2 24   BUN 16   CR 1.10*   ANIONGAP 7   BISI 9.1   *     Most Recent 3 Troponin's:No lab results found.  Most Recent 3 BNP's:No lab results found.  Most Recent Cholesterol Panel:No lab  results found.

## 2021-02-06 NOTE — PROGRESS NOTES
Interval Hospitalist Note    Patient is a 66yo female admitted earlier this morning per Dr. Zhane Zee for evaluation of second degree AV block and hypertension. H&P reviewed.     Additional workup ordered on admission --   Echocardiogram showed nl EF, no significant valve disease  Renal US neg for JEANNE  Labs stable (baseline Cr 1.1 per review of records through [facility])  TSH low but FT4 nl (patient said her PCP had to previously decrease dose of levothyroxine dt     BPs improved but had been given IV hydralazine a few hours earlier.     Seen by cardiology -- recommended PPM placement, anticipate this will be done on 2/8.     I briefly saw patient this afternoon. Was resting comfortably, no chest pain or headache. /57 during my visit with HR 45.    Will resume losartan 25mg daily as per prior to admission.     Spouse at bedside. Questions answered. Both patient/spouse were in agreement with treatment plan.     Full Code    Tamela Zee,   Internal Medicine - Hospitalist  2/6/2021  3:00 PM

## 2021-02-06 NOTE — PHARMACY-ADMISSION MEDICATION HISTORY
Pharmacy Medication History  Admission medication history interview status for the 2/6/2021  admission is complete. See EPIC admission navigator for prior to admission medications     Location of Interview: Phone  Medication history sources: Patient, Surescripts and Care Everywhere  Medication history source reliability: Good  Adherence assessment: Good    Significant changes made to the medication list:  Added:  -Ferrous sulfate 140 (45 Fe) CR tablet bid  -Levothyroxine 50 mcg tablet daily  -Losartan 25 mg tablet daily  -Neosporin ointment prn  -Omeprazole 40 mg capsule daily  -Vitamin D 50 mcg tablet daily    In the past week, patient estimated taking medication this percent of the time: greater than 90%      Medication reconciliation completed by provider prior to medication history? No    Time spent in this activity: 15 minutes      Prior to Admission medications    Not on File

## 2021-02-07 LAB
ALBUMIN UR-MCNC: 30 MG/DL
AMORPH CRY #/AREA URNS HPF: ABNORMAL /HPF
ANION GAP SERPL CALCULATED.3IONS-SCNC: 6 MMOL/L (ref 3–14)
APPEARANCE UR: ABNORMAL
B-HCG SERPL-ACNC: 2 IU/L (ref 0–5)
BILIRUB UR QL STRIP: NEGATIVE
BUN SERPL-MCNC: 29 MG/DL (ref 7–30)
CALCIUM SERPL-MCNC: 9.3 MG/DL (ref 8.5–10.1)
CHLORIDE SERPL-SCNC: 110 MMOL/L (ref 94–109)
CO2 SERPL-SCNC: 25 MMOL/L (ref 20–32)
COLOR UR AUTO: YELLOW
CREAT SERPL-MCNC: 1.5 MG/DL (ref 0.52–1.04)
ERYTHROCYTE [DISTWIDTH] IN BLOOD BY AUTOMATED COUNT: 23.9 % (ref 10–15)
GFR SERPL CREATININE-BSD FRML MDRD: 36 ML/MIN/{1.73_M2}
GLUCOSE SERPL-MCNC: 87 MG/DL (ref 70–99)
GLUCOSE UR STRIP-MCNC: NEGATIVE MG/DL
HCT VFR BLD AUTO: 43 % (ref 35–47)
HGB BLD-MCNC: 13.5 G/DL (ref 11.7–15.7)
HGB UR QL STRIP: NEGATIVE
INR PPP: 1.04 (ref 0.86–1.14)
KETONES UR STRIP-MCNC: NEGATIVE MG/DL
LEUKOCYTE ESTERASE UR QL STRIP: ABNORMAL
MCH RBC QN AUTO: 26 PG (ref 26.5–33)
MCHC RBC AUTO-ENTMCNC: 31.4 G/DL (ref 31.5–36.5)
MCV RBC AUTO: 83 FL (ref 78–100)
MUCOUS THREADS #/AREA URNS LPF: PRESENT /LPF
NITRATE UR QL: NEGATIVE
PH UR STRIP: 5.5 PH (ref 5–7)
PLATELET # BLD AUTO: 404 10E9/L (ref 150–450)
POTASSIUM SERPL-SCNC: 4.1 MMOL/L (ref 3.4–5.3)
RBC # BLD AUTO: 5.19 10E12/L (ref 3.8–5.2)
RBC #/AREA URNS AUTO: 2 /HPF (ref 0–2)
SODIUM SERPL-SCNC: 141 MMOL/L (ref 133–144)
SOURCE: ABNORMAL
SP GR UR STRIP: 1.03 (ref 1–1.03)
SQUAMOUS #/AREA URNS AUTO: 5 /HPF (ref 0–1)
UROBILINOGEN UR STRIP-MCNC: 0 MG/DL (ref 0–2)
WBC # BLD AUTO: 11.8 10E9/L (ref 4–11)
WBC #/AREA URNS AUTO: 20 /HPF (ref 0–5)

## 2021-02-07 PROCEDURE — 85027 COMPLETE CBC AUTOMATED: CPT | Performed by: INTERNAL MEDICINE

## 2021-02-07 PROCEDURE — G0378 HOSPITAL OBSERVATION PER HR: HCPCS

## 2021-02-07 PROCEDURE — 36415 COLL VENOUS BLD VENIPUNCTURE: CPT | Performed by: INTERNAL MEDICINE

## 2021-02-07 PROCEDURE — 250N000011 HC RX IP 250 OP 636: Performed by: INTERNAL MEDICINE

## 2021-02-07 PROCEDURE — 85610 PROTHROMBIN TIME: CPT | Performed by: INTERNAL MEDICINE

## 2021-02-07 PROCEDURE — 99232 SBSQ HOSP IP/OBS MODERATE 35: CPT | Performed by: INTERNAL MEDICINE

## 2021-02-07 PROCEDURE — 250N000013 HC RX MED GY IP 250 OP 250 PS 637: Performed by: INTERNAL MEDICINE

## 2021-02-07 PROCEDURE — 96376 TX/PRO/DX INJ SAME DRUG ADON: CPT

## 2021-02-07 PROCEDURE — 87086 URINE CULTURE/COLONY COUNT: CPT | Performed by: INTERNAL MEDICINE

## 2021-02-07 PROCEDURE — 80307 DRUG TEST PRSMV CHEM ANLYZR: CPT | Performed by: INTERNAL MEDICINE

## 2021-02-07 PROCEDURE — 210N000002 HC R&B HEART CARE

## 2021-02-07 PROCEDURE — 80048 BASIC METABOLIC PNL TOTAL CA: CPT | Performed by: INTERNAL MEDICINE

## 2021-02-07 PROCEDURE — 81001 URINALYSIS AUTO W/SCOPE: CPT | Performed by: INTERNAL MEDICINE

## 2021-02-07 PROCEDURE — 84702 CHORIONIC GONADOTROPIN TEST: CPT | Performed by: INTERNAL MEDICINE

## 2021-02-07 RX ORDER — CEFAZOLIN SODIUM 2 G/100ML
2 INJECTION, SOLUTION INTRAVENOUS
Status: COMPLETED | OUTPATIENT
Start: 2021-02-07 | End: 2021-02-08

## 2021-02-07 RX ORDER — SODIUM CHLORIDE 450 MG/100ML
INJECTION, SOLUTION INTRAVENOUS CONTINUOUS
Status: DISCONTINUED | OUTPATIENT
Start: 2021-02-07 | End: 2021-02-08 | Stop reason: HOSPADM

## 2021-02-07 RX ORDER — HYDRALAZINE HYDROCHLORIDE 20 MG/ML
10 INJECTION INTRAMUSCULAR; INTRAVENOUS EVERY 4 HOURS PRN
Status: DISCONTINUED | OUTPATIENT
Start: 2021-02-07 | End: 2021-02-09 | Stop reason: HOSPADM

## 2021-02-07 RX ORDER — LEVOTHYROXINE SODIUM 50 UG/1
50 TABLET ORAL
Status: DISCONTINUED | OUTPATIENT
Start: 2021-02-08 | End: 2021-02-09 | Stop reason: HOSPADM

## 2021-02-07 RX ORDER — LIDOCAINE 40 MG/G
CREAM TOPICAL
Status: DISCONTINUED | OUTPATIENT
Start: 2021-02-07 | End: 2021-02-08 | Stop reason: HOSPADM

## 2021-02-07 RX ORDER — HYDRALAZINE HYDROCHLORIDE 25 MG/1
25 TABLET, FILM COATED ORAL EVERY 8 HOURS SCHEDULED
Status: DISCONTINUED | OUTPATIENT
Start: 2021-02-08 | End: 2021-02-08

## 2021-02-07 RX ADMIN — CHLORHEXIDINE GLUCONATE: 40 SOLUTION TOPICAL at 21:20

## 2021-02-07 RX ADMIN — ACETAMINOPHEN 650 MG: 325 TABLET, FILM COATED ORAL at 13:10

## 2021-02-07 RX ADMIN — HYDRALAZINE HYDROCHLORIDE 20 MG: 20 INJECTION INTRAMUSCULAR; INTRAVENOUS at 19:30

## 2021-02-07 RX ADMIN — HYDRALAZINE HYDROCHLORIDE 20 MG: 20 INJECTION INTRAMUSCULAR; INTRAVENOUS at 06:44

## 2021-02-07 RX ADMIN — LOSARTAN POTASSIUM 25 MG: 25 TABLET, FILM COATED ORAL at 08:12

## 2021-02-07 RX ADMIN — OMEPRAZOLE 40 MG: 20 CAPSULE, DELAYED RELEASE ORAL at 08:12

## 2021-02-07 ASSESSMENT — ACTIVITIES OF DAILY LIVING (ADL)
ADLS_ACUITY_SCORE: 15
ADLS_ACUITY_SCORE: 15

## 2021-02-07 NOTE — PLAN OF CARE
Pt denies chest pain or SOB, up independently, does complain of a mild headache, VSS, Tele 2nd degree type 2 block, Plan is for a PPM tomorrow.

## 2021-02-07 NOTE — PROGRESS NOTES
[facility]    Cardiology Progress     Date of Admission:  2/6/2021  Reason for Consult: AV block        Assessment & Plan        1.  2-1 AV block  2.  Hypertensive urgency  3.  Hypothyroidism  4.  Essential hypertension     Plan:  1.  Keep Zoll at bedside over weekend and plan for dual-chamber pacemaker (right-handed so on the left side) on Monday.  N.p.o. Sunday after midnight.    2.  Continue losartan and agree with secondary causes of hypertension work-up including renal ultrasound given systolic blood pressure of 270 at outside ED  3.  No QT prolongation and I suspect outside report of QT prolongation was P wave at the end of the way falsely prolonging QT.     We will continue to follow.     Víctor Kelly MD    Interval History/Subjective:    No pauses on telemetry, continues in 2:1 AVB. Asymptomatic.     Physical Exam   Temp: 97.8  F (36.6  C) Temp src: Oral BP: 133/56 Pulse: (!) 44   Resp: 16 SpO2: 92 % O2 Device: None (Room air)    Vital Signs with Ranges  Temp:  [97.8  F (36.6  C)-98  F (36.7  C)] 97.8  F (36.6  C)  Pulse:  [42-45] 44  Resp:  [16] 16  BP: (133-194)/(55-73) 133/56  SpO2:  [92 %-95 %] 92 %  174 lbs 0 oz    Constitutional: Awake, alert, cooperative, no apparent distress.  Respiratory: Clear to auscultation bilaterally, no crackles or wheezing.  Neck: No JVD  Cardiovascular:Regular rate and rhythm, bradycardia normal S1 and S2, and 3 out of 6 systolic murmur throughout the precordium  Extremities: No lower or upper extremity edema   Vascular: Radial pulses 4+ and symmetric bilaterally    Data   Most Recent 3 CBC's:  Recent Labs   Lab Test 02/06/21  0329   WBC 10.9   HGB 13.6   MCV 83        Most Recent 3 BMP's:  Recent Labs   Lab Test 02/07/21  0611 02/06/21  0329    142   POTASSIUM 4.1 4.0   CHLORIDE 110* 111*   CO2 25 24   BUN 29 16   CR 1.50* 1.10*   ANIONGAP 6 7   BISI 9.3 9.1   GLC 87 106*     Most Recent 3 Troponin's:No lab results  found.  Most Recent 3 BNP's:No lab results found.  Most Recent Cholesterol Panel:No lab results found.

## 2021-02-07 NOTE — PROGRESS NOTES
[facility]    Hospitalist Progress Note    Assessment & Plan   Tameka Avalos is a 65 year old female with PMHx of hypertension, hypothyroidism and GERD who was admitted from [facility] ED under observation status on 2/6/2021 for evaluation of bradycardia dt second degree AVB and need for ppm placement. Was also noted to have significant hypertension. See ED note in Care Everywhere for specifics of ED visit.     Bradycardia, with 2:1 AVB  Initially presented to ED for evaluation of headache. BPs variable. No hx of syncope, dizziness/lightheadedness or falls. Typically does water aerobics during the week without difficulty. In ED, was bradycardic, HRs as low as 29-38. BPs variable -- as high as 200s. Not on ccb or bb at baseline. Transferred to [facility] for ongoing cardiac evaluation. Hasn't required pacing or atropine. Echocardiogram showed nl EF, no WMAs, no significant valve disease (trace to mild MR only).   -- cardiology following, plan is for ppm tomorrow    Accelerated Hypertension  Headache  Takes losartan 25mg daily. Reported BPs had been stable when seen in PCP's clinic ([facility] in [place]). Had been following with PCP for evaluation of 3-4wk hx of headaches. No relief in HAs after trial of sumatriptan. Hasn't been taking NSAIDs.  No vision changes (had recently seen eye doctor who had no concerns). BPs notably variable in the ED -- as high as 277/157 on admission, then seemed to settle around 160-180s systolic. Head CT, CTA head/neck was neg for acute pathology. On admission, losartan was continued and prn hydralazine ordered. Started workup for secondary causes of hypertension -- renal US neg for renal artery stenosis, thyroid studies relatively unremarkable, echo unremarkable.   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 showed baseline Cr 1.1 - 1.2 over the past year)  Cr 1.17 in ED on 2/5. Cr 1.1 on admission here. Losartan continued as this appeared to be her baseline.   -- Cr trending up: 1.1 -- 1.5 today  -- renal artery US neg for renal artery stenosis  -- don't see any recent UA in Care Everywhere -- will check one today  -- hold losartan, nephology consulted as above    Hypothyroidism  TSH nl in 12/2020 (per labs in Care Everywhere)  TSH mildly elevated at 4.73 but FT4 nl when checked this stay.  -- conts on levothyroxine 50mcg daily as per prior to admission  -- follow up with PCP for ongoing monitoring    GERD  Chronic and stable on PPI     FEN: no IVFs, lytes stable, low Na diet  DVT Prophylaxis: PCDs  Code Status: Full Code    Disposition: To have ppm tomorrow. Anticipate discharge in 2-3d pending improved BP, stable labs. Discussed with UR, changed from obs to inpatient status today.     Tamela Zee    Interval History   Seen this afternoon. BPs stable after AM dose of losartan. Minimal headache presently. No cp/sob/cough. No abd pain/n/v. Was aware that her renal function had been worsening in recent months per PCP visits.     -Data reviewed today: I reviewed all new labs and imaging results over the last 24 hours. I personally reviewed no images or EKG's today.    Physical Exam   Temp: 97.8  F (36.6  C) Temp src: Oral BP: 133/56 Pulse: (!) 44   Resp: 16 SpO2: 92 % O2 Device: None (Room air)    Vitals:    02/06/21 0300 02/07/21 0548   Weight: 78.4 kg (172 lb 14.4 oz) 78.9 kg (174 lb)     Vital Signs with Ranges  Temp:  [97.8  F (36.6  C)-98  F (36.7  C)] 97.8  F (36.6  C)  Pulse:  [42-45]  44  Resp:  [16] 16  BP: (133-194)/(55-73) 133/56  SpO2:  [92 %-95 %] 92 %  I/O last 3 completed shifts:  In: 250 [P.O.:250]  Out: -     Constitutional: Resting comfortably, alert and answering questions appropriately, NAD  Respiratory: CTAB, no wheeze/rales/rhonchi, no increased work of breathing  Cardiovascular: HRRR, no MGR, no LE edema  GI: S, NT, ND, +BS  Skin/Integumen: warm/dry  Other:      Medications       chlorhexidine   Topical Daily     losartan  25 mg Oral Daily     omeprazole  40 mg Oral Daily       Data   Recent Labs   Lab 02/07/21  0611 02/06/21  0329   WBC  --  10.9   HGB  --  13.6   MCV  --  83   PLT  --  383    142   POTASSIUM 4.1 4.0   CHLORIDE 110* 111*   CO2 25 24   BUN 29 16   CR 1.50* 1.10*   ANIONGAP 6 7   BISI 9.3 9.1   GLC 87 106*       No results found for this or any previous visit (from the past 24 hour(s)).

## 2021-02-07 NOTE — UTILIZATION REVIEW
[facility] Utilization Review  Admission Status; Secondary Review Determination     Admission Date: 2/6/2021  2:21 AM      Under the authority of the Utilization Management Committee, the utilization review process indicated a secondary review on the above patient.  The review outcome is based on review of the medical records, discussions with staff, and applying clinical experience noted on the date of the review.        (X)      Inpatient Status Appropriate - This patient's medical care is consistent with medical management for inpatient care and reasonable inpatient medical practice.          RATIONALE FOR DETERMINATION   Tameka Avalos is a 65 year old female with past medical history of aortic stenosis hypertension, who presented to the emergency room with complaints of nausea and dizziness and found to be in high-grade AV block with bradycardia and was transferred to the [facility] for urgent evaluation and management.  She was hemodynamically stable with significantly low heart rates and EP was urgently consulted who recommend urgent pacemaker placement and close monitoring with Zoll pacemaker at bedside.  Indication for urgent pacemaker placement is symptomatic high-grade AV block and bradycardia.  In light of complexity of care, advanced age, high-grade AV block with significant risk for adverse outcome including cardiac arrest and need for urgent invasive procedure with anticipated length of stay more than 2 midnights, criteria for inpatient admission is met.  Recommendation is communicated to Dr. Zee.  Plan for ongoing monitoring and pacemaker placement (dual-chamber) tomorrow.             The definitions of Inpatient Status and Observation Status used in making the determination above are those provided in the CMS Coverage Manual, Chapter 1 and Chapter 6, section 70.4.      Sincerely,       Breonna Love MD, MS  Physician Advisor  Utilization Review-[place]    Phone: [phone]

## 2021-02-07 NOTE — PLAN OF CARE
No events overnight. Tameka denies discomfort. Continues to be in a 2:1 second degree AVB, type 2. BP improved. Slept well. Plan for pacemaker on Monday.

## 2021-02-07 NOTE — PLAN OF CARE
A/Ox4. HR in 40-50s. SBPs 200s, given PRN hydralazine, SBPs 130-150s. 2nd degree type 2 AV block, 2:1 conduction. Patient complains of intermittent headache, given Tylenol. Consult cardiology. ECHO and renal ultrasound resulted. Up with SBA. Regular diet. Plan for pacemaker placement on Monday 2/8.

## 2021-02-08 LAB
ACETAMINOPHEN QUAL: NEGATIVE
AMOBARBITAL QUAL: NEGATIVE
ANION GAP SERPL CALCULATED.3IONS-SCNC: 3 MMOL/L (ref 3–14)
BACTERIA SPEC CULT: NORMAL
BARBITAL QUAL: NEGATIVE
BASOPHILS # BLD AUTO: 0.1 10E9/L (ref 0–0.2)
BASOPHILS NFR BLD AUTO: 0.5 %
BUN SERPL-MCNC: 28 MG/DL (ref 7–30)
BUTABARBITAL QUAL: NEGATIVE
BUTALBITAL QUAL: NEGATIVE
CAFFEINE QUAL: NEGATIVE
CALCIUM SERPL-MCNC: 9 MG/DL (ref 8.5–10.1)
CARBAMAZEPINE QUAL: NEGATIVE
CARISOPRODOL QUAL: NEGATIVE
CHLORIDE SERPL-SCNC: 112 MMOL/L (ref 94–109)
CHLORPROPAMIDE UR-MCNC: NEGATIVE UG/ML
CO2 SERPL-SCNC: 27 MMOL/L (ref 20–32)
CORTIS SERPL-MCNC: 14.7 UG/DL (ref 4–22)
CREAT SERPL-MCNC: 1.13 MG/DL (ref 0.52–1.04)
DIFFERENTIAL METHOD BLD: ABNORMAL
DRUGS SERPL SCN: NEGATIVE
EOSINOPHIL # BLD AUTO: 0.1 10E9/L (ref 0–0.7)
EOSINOPHIL NFR BLD AUTO: 1.1 %
ERYTHROCYTE [DISTWIDTH] IN BLOOD BY AUTOMATED COUNT: 23.9 % (ref 10–15)
ETHCLORVYNOL QUAL: NEGATIVE
ETHINAMATE QUAL: NEGATIVE
ETHOSUXIMIDE QUAL: NEGATIVE
ETHOTOIN QUAL: NEGATIVE
GFR SERPL CREATININE-BSD FRML MDRD: 50 ML/MIN/{1.73_M2}
GLUCOSE BLDC GLUCOMTR-MCNC: 118 MG/DL (ref 70–99)
GLUCOSE SERPL-MCNC: 98 MG/DL (ref 70–99)
GLUTETHIMIDE QUAL: NEGATIVE
HCT VFR BLD AUTO: 42.7 % (ref 35–47)
HGB BLD-MCNC: 13.4 G/DL (ref 11.7–15.7)
IBUPROFEN QUAL: NEGATIVE
IMM GRANULOCYTES # BLD: 0.1 10E9/L (ref 0–0.4)
IMM GRANULOCYTES NFR BLD: 0.5 %
LYMPHOCYTES # BLD AUTO: 3 10E9/L (ref 0.8–5.3)
LYMPHOCYTES NFR BLD AUTO: 27.2 %
Lab: NORMAL
MAGNESIUM SERPL-MCNC: 2.2 MG/DL (ref 1.6–2.3)
MCH RBC QN AUTO: 25.9 PG (ref 26.5–33)
MCHC RBC AUTO-ENTMCNC: 31.4 G/DL (ref 31.5–36.5)
MCV RBC AUTO: 82 FL (ref 78–100)
MEPHENYTOIN QUAL: NEGATIVE
MEPHOBARBITAL QUAL: NEGATIVE
MEPROBAMATE QUAL: NEGATIVE
METHAQUALONE QUAL: NEGATIVE
METHARBITAL QUAL: NEGATIVE
METHSUXIMIDE QUAL: NEGATIVE
METHYPRYLON QUAL: NEGATIVE
MONOCYTES # BLD AUTO: 0.6 10E9/L (ref 0–1.3)
MONOCYTES NFR BLD AUTO: 5.6 %
NEUTROPHILS # BLD AUTO: 7.1 10E9/L (ref 1.6–8.3)
NEUTROPHILS NFR BLD AUTO: 65.1 %
NRBC # BLD AUTO: 0 10*3/UL
NRBC BLD AUTO-RTO: 0 /100
PENTOBARBITAL QUAL: NEGATIVE
PHENACETIN QUAL: NEGATIVE
PHENOBARBITAL QUAL: NEGATIVE
PHENSUXIMIDE QUAL: NEGATIVE
PHENYTOIN QUAL: NEGATIVE
PLATELET # BLD AUTO: 381 10E9/L (ref 150–450)
POTASSIUM SERPL-SCNC: 4.1 MMOL/L (ref 3.4–5.3)
PRIMIDONE QUAL: NEGATIVE
RBC # BLD AUTO: 5.18 10E12/L (ref 3.8–5.2)
SALICYLATE QUAL: NEGATIVE
SECOBARBITAL QUAL: NEGATIVE
SODIUM SERPL-SCNC: 142 MMOL/L (ref 133–144)
SPECIMEN SOURCE: NORMAL
TALBUTAL QUAL: NEGATIVE
THEOPHYLLINE QUAL: NEGATIVE
THIOPENTAL QUAL: NEGATIVE
TYBAMATE QUAL: NEGATIVE
VALPROIC ACID QUAL: NEGATIVE
WBC # BLD AUTO: 10.9 10E9/L (ref 4–11)

## 2021-02-08 PROCEDURE — 999N001160 HC STATISICAL ALDOSTERONE/RENIN RATIO: Performed by: INTERNAL MEDICINE

## 2021-02-08 PROCEDURE — 83835 ASSAY OF METANEPHRINES: CPT | Performed by: INTERNAL MEDICINE

## 2021-02-08 PROCEDURE — C1785 PMKR, DUAL, RATE-RESP: HCPCS | Performed by: INTERNAL MEDICINE

## 2021-02-08 PROCEDURE — 999N001017 HC STATISTIC GLUCOSE BY METER IP

## 2021-02-08 PROCEDURE — 99223 1ST HOSP IP/OBS HIGH 75: CPT | Performed by: INTERNAL MEDICINE

## 2021-02-08 PROCEDURE — 99232 SBSQ HOSP IP/OBS MODERATE 35: CPT | Performed by: HOSPITALIST

## 2021-02-08 PROCEDURE — 33208 INSRT HEART PM ATRIAL & VENT: CPT | Performed by: INTERNAL MEDICINE

## 2021-02-08 PROCEDURE — 99152 MOD SED SAME PHYS/QHP 5/>YRS: CPT | Performed by: INTERNAL MEDICINE

## 2021-02-08 PROCEDURE — 84244 ASSAY OF RENIN: CPT | Performed by: INTERNAL MEDICINE

## 2021-02-08 PROCEDURE — 80048 BASIC METABOLIC PNL TOTAL CA: CPT | Performed by: INTERNAL MEDICINE

## 2021-02-08 PROCEDURE — 83735 ASSAY OF MAGNESIUM: CPT | Performed by: INTERNAL MEDICINE

## 2021-02-08 PROCEDURE — 250N000011 HC RX IP 250 OP 636: Performed by: INTERNAL MEDICINE

## 2021-02-08 PROCEDURE — 250N000013 HC RX MED GY IP 250 OP 250 PS 637: Performed by: INTERNAL MEDICINE

## 2021-02-08 PROCEDURE — 85025 COMPLETE CBC W/AUTO DIFF WBC: CPT | Performed by: INTERNAL MEDICINE

## 2021-02-08 PROCEDURE — 0JH606Z INSERTION OF PACEMAKER, DUAL CHAMBER INTO CHEST SUBCUTANEOUS TISSUE AND FASCIA, OPEN APPROACH: ICD-10-PCS | Performed by: INTERNAL MEDICINE

## 2021-02-08 PROCEDURE — 99153 MOD SED SAME PHYS/QHP EA: CPT | Performed by: INTERNAL MEDICINE

## 2021-02-08 PROCEDURE — 36415 COLL VENOUS BLD VENIPUNCTURE: CPT | Performed by: INTERNAL MEDICINE

## 2021-02-08 PROCEDURE — 210N000002 HC R&B HEART CARE

## 2021-02-08 PROCEDURE — C1894 INTRO/SHEATH, NON-LASER: HCPCS | Performed by: INTERNAL MEDICINE

## 2021-02-08 PROCEDURE — 272N000001 HC OR GENERAL SUPPLY STERILE: Performed by: INTERNAL MEDICINE

## 2021-02-08 PROCEDURE — 02HK3JZ INSERTION OF PACEMAKER LEAD INTO RIGHT VENTRICLE, PERCUTANEOUS APPROACH: ICD-10-PCS | Performed by: INTERNAL MEDICINE

## 2021-02-08 PROCEDURE — 250N000009 HC RX 250: Performed by: INTERNAL MEDICINE

## 2021-02-08 PROCEDURE — 82088 ASSAY OF ALDOSTERONE: CPT | Performed by: INTERNAL MEDICINE

## 2021-02-08 PROCEDURE — 258N000002 HC RX IP 258 OP 250: Performed by: INTERNAL MEDICINE

## 2021-02-08 PROCEDURE — 02H63JZ INSERTION OF PACEMAKER LEAD INTO RIGHT ATRIUM, PERCUTANEOUS APPROACH: ICD-10-PCS | Performed by: INTERNAL MEDICINE

## 2021-02-08 PROCEDURE — C1898 LEAD, PMKR, OTHER THAN TRANS: HCPCS | Performed by: INTERNAL MEDICINE

## 2021-02-08 PROCEDURE — 82533 TOTAL CORTISOL: CPT | Performed by: INTERNAL MEDICINE

## 2021-02-08 PROCEDURE — 99232 SBSQ HOSP IP/OBS MODERATE 35: CPT | Mod: 25 | Performed by: INTERNAL MEDICINE

## 2021-02-08 DEVICE — LEAD INGEVITY+ AF IS1 7840 4CM: Type: IMPLANTABLE DEVICE | Status: FUNCTIONAL

## 2021-02-08 DEVICE — LEAD INGEVITY+ AF IS1 7841 52CM: Type: IMPLANTABLE DEVICE | Status: FUNCTIONAL

## 2021-02-08 DEVICE — PCMKR CARD ACCOLADE MRI EL DR: Type: IMPLANTABLE DEVICE | Status: FUNCTIONAL

## 2021-02-08 RX ORDER — LOSARTAN POTASSIUM 50 MG/1
50 TABLET ORAL DAILY
Status: DISCONTINUED | OUTPATIENT
Start: 2021-02-08 | End: 2021-02-09

## 2021-02-08 RX ORDER — OXYCODONE AND ACETAMINOPHEN 5; 325 MG/1; MG/1
1 TABLET ORAL EVERY 4 HOURS PRN
Status: DISCONTINUED | OUTPATIENT
Start: 2021-02-08 | End: 2021-02-09 | Stop reason: HOSPADM

## 2021-02-08 RX ORDER — CEFAZOLIN SODIUM 1 G/3ML
1 INJECTION, POWDER, FOR SOLUTION INTRAMUSCULAR; INTRAVENOUS EVERY 8 HOURS
Status: COMPLETED | OUTPATIENT
Start: 2021-02-08 | End: 2021-02-09

## 2021-02-08 RX ORDER — DOBUTAMINE HYDROCHLORIDE 200 MG/100ML
5-40 INJECTION INTRAVENOUS CONTINUOUS PRN
Status: DISCONTINUED | OUTPATIENT
Start: 2021-02-08 | End: 2021-02-08 | Stop reason: HOSPADM

## 2021-02-08 RX ORDER — FENTANYL CITRATE 50 UG/ML
INJECTION, SOLUTION INTRAMUSCULAR; INTRAVENOUS
Status: DISCONTINUED | OUTPATIENT
Start: 2021-02-08 | End: 2021-02-08 | Stop reason: HOSPADM

## 2021-02-08 RX ORDER — CEFAZOLIN SODIUM 1 G/3ML
1 INJECTION, POWDER, FOR SOLUTION INTRAMUSCULAR; INTRAVENOUS
Status: DISCONTINUED | OUTPATIENT
Start: 2021-02-08 | End: 2021-02-08 | Stop reason: HOSPADM

## 2021-02-08 RX ORDER — HEPARIN SODIUM 200 [USP'U]/100ML
100-600 INJECTION, SOLUTION INTRAVENOUS CONTINUOUS PRN
Status: DISCONTINUED | OUTPATIENT
Start: 2021-02-08 | End: 2021-02-08 | Stop reason: HOSPADM

## 2021-02-08 RX ORDER — HYDRALAZINE HYDROCHLORIDE 50 MG/1
50 TABLET, FILM COATED ORAL EVERY 8 HOURS SCHEDULED
Status: DISCONTINUED | OUTPATIENT
Start: 2021-02-08 | End: 2021-02-09

## 2021-02-08 RX ORDER — HEPARIN SODIUM 200 [USP'U]/100ML
100-500 INJECTION, SOLUTION INTRAVENOUS CONTINUOUS PRN
Status: DISCONTINUED | OUTPATIENT
Start: 2021-02-08 | End: 2021-02-08 | Stop reason: HOSPADM

## 2021-02-08 RX ADMIN — CEFAZOLIN SODIUM 2 G: 2 INJECTION, SOLUTION INTRAVENOUS at 08:55

## 2021-02-08 RX ADMIN — LOSARTAN POTASSIUM 50 MG: 50 TABLET, FILM COATED ORAL at 13:13

## 2021-02-08 RX ADMIN — ONDANSETRON 4 MG: 2 INJECTION INTRAMUSCULAR; INTRAVENOUS at 01:39

## 2021-02-08 RX ADMIN — OMEPRAZOLE 40 MG: 20 CAPSULE, DELAYED RELEASE ORAL at 12:06

## 2021-02-08 RX ADMIN — LEVOTHYROXINE SODIUM 50 MCG: 50 TABLET ORAL at 06:10

## 2021-02-08 RX ADMIN — HYDRALAZINE HYDROCHLORIDE 10 MG: 20 INJECTION INTRAMUSCULAR; INTRAVENOUS at 00:35

## 2021-02-08 RX ADMIN — HYDRALAZINE HYDROCHLORIDE 50 MG: 50 TABLET, FILM COATED ORAL at 22:12

## 2021-02-08 RX ADMIN — HYDRALAZINE HYDROCHLORIDE 25 MG: 25 TABLET ORAL at 06:10

## 2021-02-08 RX ADMIN — CEFAZOLIN 1 G: 1 INJECTION, POWDER, FOR SOLUTION INTRAMUSCULAR; INTRAVENOUS at 17:14

## 2021-02-08 RX ADMIN — CHLORHEXIDINE GLUCONATE: 40 SOLUTION TOPICAL at 12:06

## 2021-02-08 RX ADMIN — ACETAMINOPHEN 650 MG: 325 TABLET, FILM COATED ORAL at 01:33

## 2021-02-08 RX ADMIN — SODIUM CHLORIDE 30 ML/HR: 4.5 INJECTION, SOLUTION INTRAVENOUS at 08:53

## 2021-02-08 RX ADMIN — ACETAMINOPHEN 650 MG: 325 TABLET, FILM COATED ORAL at 17:59

## 2021-02-08 RX ADMIN — HYDRALAZINE HYDROCHLORIDE 50 MG: 50 TABLET, FILM COATED ORAL at 14:04

## 2021-02-08 ASSESSMENT — ACTIVITIES OF DAILY LIVING (ADL)
ADLS_ACUITY_SCORE: 15

## 2021-02-08 NOTE — PROGRESS NOTES
"[facility]    EP Progress Note    Date of Service (when I saw the patient): 02/08/2021     Assessment & Plan   Tameka Avalos is a 65 year old female with h/o HTN, treated hypothyroidism who was admitted on 2/6/2021 from [facility] ER for 2:1 HB after presenting with nausea and headache. Transferred to [facility] for consideration of PPM implantation.    1. Second degree AV Block; EF 60-65% -   - HRs in 30-40s  - No AV yazmin blocking agents on admission. TSH wnl. No RFs for Lymes  - She did have some c/o dizziness, but otherwise has felt \"good\" with normal activity levels. No falling/fainting.    - Echo 2/6/2021 with EF 60-65%. No RWMA. Nl RV. No sig valve abnls  - Interestingly, went into SR this AM, then on exam back to bradycardia     PLAN:   1. Reviewed that despite intermittent return of NSR, was noted to be intermittently bradycardic on exam.  No reversible causes seen and therefore will have dual chamber PPM today.    2. We discussed the risks, benefits and indications of dual chamber pacemaker implantation today, including but not limited to use of conscious sedation, discomfort, peripheral vessel injury, pneumothorax, cardiac puncture and/or tamponade, device malfunction and/or recall, and infection requiring explantation of the device and long term antibiotics. We also briefly discussed follow up expectations and restrictions following the procedure. The patient voiced understanding and is willing to proceed.  A consent form was signed.   3. Of note, she lives near [facility] - may want follow-up there. Leaving for [place] at end of month   4. Anticipate discharge tomorrow AM    2. HTN -   - PTA on losartan 25 mg daily   - Here, has been nonted to have BPs fluctuating between 130s-230s requiring IV hydralazine  - Echo with nl EF  - Neck CTA without sig dz but mild narrowing of proximal L SCA noted.  Pulses equal on exam.   - Renal Doppler negative     PLAN:   1. Collecting " "24 hour urine for metanephrines/etc   2. Dr. Lafleur/Nephrology has seen and rec'd ~15-20% reduction in SBP with BPs 160-170s acceptable for now    3. JAMES best at [facility] -   - Interestingly, echo without any aortic valve pathology; CT neck without R SCA stesosis (mild L SCA stenosis noted)  - Radial pulses full/equal bilaterally  - States she's \"always had a murmur\"      ZAHRA Jenkins-JOSETTE    Interval History   Feeling \"good\" but still with some headache at back of head.     Physical Exam   Temp: 98.2  F (36.8  C) Temp src: Oral BP: (!) 206/81 Pulse: (!) 41   Resp: 16 SpO2: 92 % O2 Device: None (Room air)    Vitals:    02/06/21 0300 02/07/21 0548 02/08/21 0430   Weight: 78.4 kg (172 lb 14.4 oz) 78.9 kg (174 lb) 77.5 kg (170 lb 14.4 oz)     Vital Signs with Ranges  Temp:  [97.5  F (36.4  C)-98.2  F (36.8  C)] 98.2  F (36.8  C)  Pulse:  [40-45] 41  Resp:  [16-18] 16  BP: (133-232)/(56-88) 206/81  SpO2:  [91 %-93 %] 92 %  I/O last 3 completed shifts:  In: -   Out: 525 [Urine:525]    Telemetry: Currently NSR 70s; had been 2:1 HB in 40s    Constitutional: awake, alert, cooperative, no apparent distress, and appears stated age  Respiratory: CTA B  Cardiovascular: Regular on exam. 2/6 JAMES. L carotid bruit  Musculoskeletal: No edema. Radial pulses equal bilaterally     Medications     NaCl         ceFAZolin  2 g Intravenous Pre-Op/Pre-procedure x 1 dose     chlorhexidine   Topical Daily     hydrALAZINE  25 mg Oral Q8H CAL     levothyroxine  50 mcg Oral QAM AC     omeprazole  40 mg Oral Daily     sodium chloride (PF)  3 mL Intracatheter Q8H       Data   No EKG today    Results for orders placed or performed during the hospital encounter of 02/06/21 (from the past 24 hour(s))   UA with Microscopic reflex to Culture    Specimen: Urine clean catch; Midstream Urine   Result Value Ref Range    Color Urine Yellow     Appearance Urine Slightly Cloudy     Glucose Urine Negative NEG^Negative mg/dL    Bilirubin Urine Negative " NEG^Negative    Ketones Urine Negative NEG^Negative mg/dL    Specific Gravity Urine 1.030 1.003 - 1.035    Blood Urine Negative NEG^Negative    pH Urine 5.5 5.0 - 7.0 pH    Protein Albumin Urine 30 (A) NEG^Negative mg/dL    Urobilinogen mg/dL 0.0 0.0 - 2.0 mg/dL    Nitrite Urine Negative NEG^Negative    Leukocyte Esterase Urine Large (A) NEG^Negative    Source Midstream Urine     WBC Urine 20 (H) 0 - 5 /HPF    RBC Urine 2 0 - 2 /HPF    Squamous Epithelial /HPF Urine 5 (H) 0 - 1 /HPF    Mucous Urine Present (A) NEG^Negative /LPF    Amorphous Crystals Few (A) NEG^Negative /HPF   Urine Culture Aerobic Bacterial    Specimen: Midstream Urine   Result Value Ref Range    Specimen Description Midstream Urine     Special Requests Specimen received in preservative     Culture Micro PENDING    CBC with platelets   Result Value Ref Range    WBC 11.8 (H) 4.0 - 11.0 10e9/L    RBC Count 5.19 3.8 - 5.2 10e12/L    Hemoglobin 13.5 11.7 - 15.7 g/dL    Hematocrit 43.0 35.0 - 47.0 %    MCV 83 78 - 100 fl    MCH 26.0 (L) 26.5 - 33.0 pg    MCHC 31.4 (L) 31.5 - 36.5 g/dL    RDW 23.9 (H) 10.0 - 15.0 %    Platelet Count 404 150 - 450 10e9/L   HCG quantitative pregnancy   Result Value Ref Range    HCG Quantitative Serum 2 0 - 5 IU/L   INR   Result Value Ref Range    INR 1.04 0.86 - 1.14   Glucose by meter   Result Value Ref Range    Glucose 118 (H) 70 - 99 mg/dL   Basic metabolic panel   Result Value Ref Range    Sodium 142 133 - 144 mmol/L    Potassium 4.1 3.4 - 5.3 mmol/L    Chloride 112 (H) 94 - 109 mmol/L    Carbon Dioxide 27 20 - 32 mmol/L    Anion Gap 3 3 - 14 mmol/L    Glucose 98 70 - 99 mg/dL    Urea Nitrogen 28 7 - 30 mg/dL    Creatinine 1.13 (H) 0.52 - 1.04 mg/dL    GFR Estimate 50 (L) >60 mL/min/[1.73_m2]    GFR Estimate If Black 58 (L) >60 mL/min/[1.73_m2]    Calcium 9.0 8.5 - 10.1 mg/dL   CBC with platelets differential   Result Value Ref Range    WBC 10.9 4.0 - 11.0 10e9/L    RBC Count 5.18 3.8 - 5.2 10e12/L    Hemoglobin 13.4  11.7 - 15.7 g/dL    Hematocrit 42.7 35.0 - 47.0 %    MCV 82 78 - 100 fl    MCH 25.9 (L) 26.5 - 33.0 pg    MCHC 31.4 (L) 31.5 - 36.5 g/dL    RDW 23.9 (H) 10.0 - 15.0 %    Platelet Count 381 150 - 450 10e9/L    Diff Method Automated Method     % Neutrophils 65.1 %    % Lymphocytes 27.2 %    % Monocytes 5.6 %    % Eosinophils 1.1 %    % Basophils 0.5 %    % Immature Granulocytes 0.5 %    Nucleated RBCs 0 0 /100    Absolute Neutrophil 7.1 1.6 - 8.3 10e9/L    Absolute Lymphocytes 3.0 0.8 - 5.3 10e9/L    Absolute Monocytes 0.6 0.0 - 1.3 10e9/L    Absolute Eosinophils 0.1 0.0 - 0.7 10e9/L    Absolute Basophils 0.1 0.0 - 0.2 10e9/L    Abs Immature Granulocytes 0.1 0 - 0.4 10e9/L    Absolute Nucleated RBC 0.0    Magnesium   Result Value Ref Range    Magnesium 2.2 1.6 - 2.3 mg/dL

## 2021-02-08 NOTE — PROGRESS NOTES
I reviewed her chart. I sent two pages to Dr. Zee earlier today ~ 1700.     65 y.o woman, admitted on 2/6 with hypertensive urgency and 2:1 AVB.   Cardiology is planning dual pacemaker placement tomorrow.   SBPwidely fluctuates between 130s-230.   She had two doses of IV hydralazine 20 mg, one this morning and at 1930    Current vitals Afebrile HR 71 /62. RR 19 O2 sat at 90% ?    SCr 1.1--->1.5   K 4.1 bicarb 25 TSH 4.7    Head and neck CTA at outside at outside hospital  HEAD CT:  1.  No intracranial hemorrhage, mass lesions, hydrocephalus or CT evidence for an acute infarct.    HEAD CTA:   1.  No high-grade stenosis or occlusion of the major intracranial arteries. No intracranial aneurysms.    NECK CTA:  1.  No hemodynamically significant narrowing of the internal carotid arteries bilaterally based on the NASCET criteria.  2.  The vertebral arteries are patent throughout their course in the neck.  3.  Mild narrowing of the proximal left subclavian artery.     Renal doppler: No renal artery stenosis. Normal appearing kidneys.     Plan:  # Decrease hydralazine prn order from 20 mg q4hrs if SBP >180 to 10 mq to avoid wide fluctuation in SBP  # Start hydralazine 25 mg tid tomorrow  # Renin/romelia, urine/serum metanephrines, cortisol ordered.   # Goal is ~ 15-20% reduction in SBP. Okay with SBP ~ 160s-170 for now.  # KODY due to contrast and wide fluctuation of SBP.     Full consult in AM. Please call with questions or concerns. Thanks.    I spent 20 minute reviewing this case.

## 2021-02-08 NOTE — PLAN OF CARE
Tameka is alert, oriented, pleasant. She's in a 2:1 second degree AVB, type 2. Continues to have high BP. Received PRN hydralazine x 2. (Please note: drastic drop in SBP from 236 to 134 documented was a poor reading and has been deleted.) She did have one episode of severe headache, nausea, and slight tremors while BP was elevated. Received Tylenol and Zofran with good results. Slept poorly.      Plan for dual chamber pacemaker today. NPO overnight. Twenty-four hour urine collection in place until 1940 on 02/08.

## 2021-02-08 NOTE — PROGRESS NOTES
[facility]    Medicine Progress Note - Hospitalist Service       Date of Admission:  2/6/2021  Assessment & Plan       Tameka Avalos is a 65 year old female with PMHx of hypertension, hypothyroidism and GERD who was admitted from [facility] ED under observation status on 2/6/2021 for evaluation of bradycardia dt second degree AVB and need for ppm placement. Was also noted to have significant hypertension. See ED note in Care Everywhere for specifics of ED visit.      Bradycardia, with 2:1 AVB  S/P dual chamber PPM 2/8/21  Initially presented to ED for evaluation of headache. BPs variable. No hx of syncope, dizziness/lightheadedness or falls. Typically does water aerobics during the week without difficulty. In ED, was bradycardic, HRs as low as 29-38. BPs variable -- as high as 200s. Not on ccb or bb at baseline. Transferred to [facility] for ongoing cardiac evaluation. Hasn't required pacing or atropine. Echocardiogram showed nl EF, no WMAs, no significant valve disease (trace to mild MR only).   - cardiology following, standard post PPM placement protocol  - device check and CXR tomorrow     Accelerated Hypertension  Headache  Takes losartan 25mg daily. Reported BPs had been stable when seen in PCP's clinic ([facility] in [place]). Had been following with PCP for evaluation of 3-4wk hx of headaches. No relief in HAs after trial of sumatriptan. Hasn't been taking NSAIDs.  No vision changes (had recently seen eye doctor who had no concerns). BPs notably variable in the ED -- as high as 277/157 on admission, then seemed to settle around 160-180s systolic. Head CT, CTA head/neck was neg for acute pathology. On admission, losartan was continued and prn hydralazine ordered. Started workup for secondary causes of hypertension - renal US neg for renal artery stenosis, thyroid studies relatively unremarkable, echo unremarkable.   - holding ccb and bb dt above  - consider additional of  oral hydralazine  - nephrology consulted for assistance with further management of BPs and evaluation (additional workup for secondary causes of hypertension?) given worsening renal function  - hydralazine 50 mg tid per nephro  - improvement noted so far     KODY (resolved) on suspected stage II-III CKD  Baseline Cr unknown (limited labs in Care Everywhere through [facility] showed baseline Cr 1.1 - 1.2 over the past year)  Cr 1.17 in ED on 2/5. Cr 1.1 on admission here. Losartan continued as this appeared to be her baseline.   - Cr trending up: 1.1 -- 1.5 --> 1.1 today  - renal artery US neg for renal artery stenosis  - UA added on - culture pending, not appearing to be UTI, but indeterminate - await cultures  - held losartan yesterday, nephology consulted as above     Hypothyroidism  TSH nl in 12/2020 (per labs in Care Everywhere)  TSH mildly elevated at 4.73 but FT4 nl when checked this stay.  - conts on levothyroxine 50mcg daily   - follow up with PCP for ongoing monitoring     GERD  Chronic and stable on PPI      Diet: NPO for Medical/Clinical Reasons Except for: Other; Specify: May take medications with a drink of water prior to Electrophysiology study    DVT Prophylaxis: Pneumatic Compression Devices  Palma Catheter: not present  Code Status: Full Code           Disposition Plan   Expected discharge: 2+ days pending PPM and improved BP  Entered: Oli Shay MD 02/08/2021, 8:47 AM       The patient's care was discussed with the Bedside Nurse and Patient.    Oli Shay MD  Hospitalist Service  [facility]  Contact information available via [facility] Paging/Directory    ______________________________________________________________________    Interval History   Care assumed today, chart reviewed. Patient seen and examined. Doing well after PPM placement. No sob, palpitations, chest pain or dizziness. BP improved.     Data reviewed today: I reviewed all medications, new labs and  imaging results over the last 24 hours. I personally reviewed no images or EKG's today.    Physical Exam   Vital Signs: Temp: 98.2  F (36.8  C) Temp src: Oral BP: (!) 206/81 Pulse: (!) 41   Resp: 16 SpO2: 92 % O2 Device: None (Room air)    Weight: 170 lbs 14.4 oz    Gen: NAD, pleasant  HEENT: Normocephalic, EOMI, MMM  Resp: no crackles,  no wheezes, no increased work of resp  CV: S1S2 heard, reg rhythm, reg rate, no pedal edema  Abdo: soft, nontender, nondistended, bowel sounds present  Ext: calves nontender, well perfused  Neuro: AAOx3, CN grossly intact, no facial asymmetry      Data   Recent Labs   Lab 02/08/21  0601 02/07/21  1923 02/07/21  0611 02/06/21  0329   WBC 10.9 11.8*  --  10.9   HGB 13.4 13.5  --  13.6   MCV 82 83  --  83    404  --  383   INR  --  1.04  --   --      --  141 142   POTASSIUM 4.1  --  4.1 4.0   CHLORIDE 112*  --  110* 111*   CO2 27  --  25 24   BUN 28  --  29 16   CR 1.13*  --  1.50* 1.10*   ANIONGAP 3  --  6 7   BISI 9.0  --  9.3 9.1   GLC 98  --  87 106*     No results found for this or any previous visit (from the past 24 hour(s)).

## 2021-02-08 NOTE — CONSULTS
Consult Date:  02/08/2021      REQUESTING PHYSICIAN:  Dr. Zee      CONSULTANT:  Israel Goodson MD.      REASON FOR CONSULTATION:  Hypertension and chronic kidney disease, stage III.      HISTORY OF PRESENT ILLNESS:  This is a 65-year-old female who was admitted with 3-4 weeks of headache and she was noted to be bradycardic with heart rate in the 30s and severely hypertension with systolics over 200.  She noted 3-4 weeks of headache which was not responding to treatment with Tylenol or sumatriptan.  Prior to admission, she had a history of hypertension which was quite well controlled on losartan at 25 mg per day and she was also on Synthroid.  She was noted to have an abnormal cardiac rhythm with heart block and a pacemaker was placed today.  She got prophylactic Ancef before this procedure.      Currently, she is pain-free.  She does not have a headache and she does not have chest or abdominal pain.  She has no shortness of breath, nausea or vomiting.  After her procedure, she is beginning to take p.o. with clear liquids without problems.      PAST MEDICAL HISTORY:  hypertension and hypothyroidism.  She does not tell me she has a history of chronic kidney disease, but as we will talk about later, she does have chronic kidney disease stage III based on her historical laboratories.  She has not had any surgeries.      SOCIAL HISTORY:  She regularly exercises.  She is retired.  She does not smoke or drink.      FAMILY HISTORY:  The father had a pacemaker.  No one had chronic kidney disease, end-stage renal disease.  She does not know of anyone who had hypertension, but she is quite vague about this.      REVIEW OF SYSTEMS:  Negative except as noted above.      PHYSICAL EXAMINATION:   GENERAL:  She is alert.  She is resting comfortably in bed.  She is somewhat somnolent after her procedure.   VITAL SIGNS:  Her blood pressure is 206/81, dropping to 158/85.  Her most recent pulse is 85, respiratory rate is 16.  She is  afebrile.   HEENT:  Head shows no trauma.  The eyes show pupils round and reactive to light.  Mouth shows good dentition.  Posterior pharynx is clear.   NECK:  Supple.   CHEST:  Shows a pacemaker site on the left infraclavicular region with a dressing in place, which is clean.   CARDIAC:  Regular rhythm, normal S1, S2, 2/6 systolic murmur.   LUNGS:  Clear anterior breath sounds.   ABDOMEN:  Soft and nontender, no hepatosplenomegaly.  Normal bowel sounds, no bruit appreciated.   EXTREMITIES:  Normal nails, joints and pulses.  No edema.   NEUROLOGIC:  Symmetric cranial nerves, normal mental status, moves all 4 extremities well.      LABORATORY DATA:  In 11/2020, she was severely iron deficient and had B12 deficiency as well.  Her hemoglobin was 8.6 with an MCV of 74.  In 12/2020 the creatinine was 1.21 with estimated GFR of 54.  On 01/21 the creatinine was 1.17, estimated GFR 56.  In 02/06/2021, her TSH was 4.73, which is slightly elevated.  On 02/07/2021, a urinalysis showed 30 of protein, specific gravity of 1.030, large leukocyte esterase, 20 white cells, 2 red cells.  Urine culture is pending.  On 02/08/2021, her electrolytes are normal.  Her creatinine was 1.13, estimated GFR of 50, calcium 9.0, magnesium 2.2, hemoglobin 13.4, MCV 82.  A renal ultrasound done on 02/06/2021 showed normal size kidneys with no hydronephrosis and no evidence of renal artery stenosis.      IMPRESSION:   1.  Hypertension.  She is currently on hydralazine.  A beta blocker has been avoided until now when she has her pacemaker in place.  She was on losartan with good control preadmission.  Our plan will be to increase her hydralazine to 50 mg 3 times a day.  I am going to restart losartan at 50 mg per day.  We will monitor her blood pressure overnight as it is still high.  If it remains high tomorrow morning, we could add a diuretic such as hydrochlorothiazide.      She will need followup in our Renal Clinic 1-2 weeks after discharge for  management of her severe hypertension and followup of her laboratory studies and kidney function.   2.  Urinary tract infections.  She has gotten some Ancef.  Her urinalysis was abnormal with white cells and positive leukocyte esterase.  We will await the culture and adjust antibiotics as needed.   3.  Chronic kidney disease, stage III.  Her historical laboratories show a creatinine of 1.1-1.2 with an estimated GFR in the 50s.  This is consistent with chronic kidney disease, stage III.  She has a normal renal ultrasound.  It is uncertain as to what the cause is and it may simply be longstanding hypertension.  I do not necessarily get a history of nonsteroidal anti-inflammatory drug use and I do not see any other nephrotoxic exposures.  She is not a diabetic.  There is no family history of chronic kidney disease.  Again, once she has stabilized blood pressure, we will need to follow her up in 1-2 weeks after she leaves the hospital for clinical monitoring.   4.  Anemia.  Her most recent hemoglobin here in the hospital was 13.4 with an MCV of 82.  However, in last November, she had severe iron deficiency and a hemoglobin of 8.6.  I am uncertain as to what treatment she has had in the interim.  We will recheck iron stores and B12 tomorrow as well as a hemoglobin.         JOELLE GOLDBERG MD             D: 2021   T: 2021   MT: KAUSHIK      Name:     MICK SMITH   MRN:      [id number]        Account:       [id number]   :      1955           Consult Date:  2021      Document: [id number]

## 2021-02-09 ENCOUNTER — TELEPHONE (OUTPATIENT)
Dept: CARDIOLOGY | Facility: CLINIC | Age: 66
End: 2021-02-09

## 2021-02-09 ENCOUNTER — APPOINTMENT (OUTPATIENT)
Dept: GENERAL RADIOLOGY | Facility: CLINIC | Age: 66
DRG: 243 | End: 2021-02-09
Attending: INTERNAL MEDICINE
Payer: COMMERCIAL

## 2021-02-09 VITALS
HEART RATE: 66 BPM | TEMPERATURE: 97.8 F | BODY MASS INDEX: 31.47 KG/M2 | SYSTOLIC BLOOD PRESSURE: 172 MMHG | HEIGHT: 62 IN | RESPIRATION RATE: 16 BRPM | DIASTOLIC BLOOD PRESSURE: 79 MMHG | OXYGEN SATURATION: 95 % | WEIGHT: 171 LBS

## 2021-02-09 DIAGNOSIS — I44.2 COMPLETE ATRIOVENTRICULAR BLOCK (H): Primary | ICD-10-CM

## 2021-02-09 DIAGNOSIS — Z95.0 CARDIAC PACEMAKER IN SITU: ICD-10-CM

## 2021-02-09 PROBLEM — I10 HTN (HYPERTENSION): Status: ACTIVE | Noted: 2021-02-09

## 2021-02-09 LAB
ALDOST SERPL-MCNC: 4.8 NG/DL (ref 0–31)
ANION GAP SERPL CALCULATED.3IONS-SCNC: 5 MMOL/L (ref 3–14)
BUN SERPL-MCNC: 24 MG/DL (ref 7–30)
CALCIUM SERPL-MCNC: 9.1 MG/DL (ref 8.5–10.1)
CHLORIDE SERPL-SCNC: 111 MMOL/L (ref 94–109)
CO2 SERPL-SCNC: 25 MMOL/L (ref 20–32)
CREAT SERPL-MCNC: 1.04 MG/DL (ref 0.52–1.04)
FERRITIN SERPL-MCNC: 32 NG/ML (ref 8–252)
FOLATE SERPL-MCNC: 11.9 NG/ML
GFR SERPL CREATININE-BSD FRML MDRD: 56 ML/MIN/{1.73_M2}
GLUCOSE SERPL-MCNC: 82 MG/DL (ref 70–99)
HGB BLD-MCNC: 13.5 G/DL (ref 11.7–15.7)
INTERPRETATION ECG - MUSE: NORMAL
IRON SATN MFR SERPL: 13 % (ref 15–46)
IRON SERPL-MCNC: 44 UG/DL (ref 35–180)
MAGNESIUM SERPL-MCNC: 2.2 MG/DL (ref 1.6–2.3)
POTASSIUM SERPL-SCNC: 4.5 MMOL/L (ref 3.4–5.3)
SODIUM SERPL-SCNC: 141 MMOL/L (ref 133–144)
TIBC SERPL-MCNC: 344 UG/DL (ref 240–430)
VIT B12 SERPL-MCNC: 287 PG/ML (ref 193–986)

## 2021-02-09 PROCEDURE — 82607 VITAMIN B-12: CPT | Performed by: INTERNAL MEDICINE

## 2021-02-09 PROCEDURE — 250N000013 HC RX MED GY IP 250 OP 250 PS 637: Performed by: INTERNAL MEDICINE

## 2021-02-09 PROCEDURE — 93010 ELECTROCARDIOGRAM REPORT: CPT | Performed by: INTERNAL MEDICINE

## 2021-02-09 PROCEDURE — 83735 ASSAY OF MAGNESIUM: CPT | Performed by: INTERNAL MEDICINE

## 2021-02-09 PROCEDURE — 82746 ASSAY OF FOLIC ACID SERUM: CPT | Performed by: INTERNAL MEDICINE

## 2021-02-09 PROCEDURE — 93005 ELECTROCARDIOGRAM TRACING: CPT

## 2021-02-09 PROCEDURE — 85018 HEMOGLOBIN: CPT | Performed by: INTERNAL MEDICINE

## 2021-02-09 PROCEDURE — 99232 SBSQ HOSP IP/OBS MODERATE 35: CPT | Mod: 25 | Performed by: INTERNAL MEDICINE

## 2021-02-09 PROCEDURE — 999N000065 XR CHEST 2 VW

## 2021-02-09 PROCEDURE — 82728 ASSAY OF FERRITIN: CPT | Performed by: INTERNAL MEDICINE

## 2021-02-09 PROCEDURE — 99232 SBSQ HOSP IP/OBS MODERATE 35: CPT | Performed by: INTERNAL MEDICINE

## 2021-02-09 PROCEDURE — 36415 COLL VENOUS BLD VENIPUNCTURE: CPT | Performed by: INTERNAL MEDICINE

## 2021-02-09 PROCEDURE — 80048 BASIC METABOLIC PNL TOTAL CA: CPT | Performed by: INTERNAL MEDICINE

## 2021-02-09 PROCEDURE — 99239 HOSP IP/OBS DSCHRG MGMT >30: CPT | Performed by: HOSPITALIST

## 2021-02-09 PROCEDURE — 83540 ASSAY OF IRON: CPT | Performed by: INTERNAL MEDICINE

## 2021-02-09 PROCEDURE — 83550 IRON BINDING TEST: CPT | Performed by: INTERNAL MEDICINE

## 2021-02-09 PROCEDURE — 250N000011 HC RX IP 250 OP 636: Performed by: INTERNAL MEDICINE

## 2021-02-09 RX ORDER — LOSARTAN POTASSIUM 50 MG/1
50 TABLET ORAL 2 TIMES DAILY
Status: DISCONTINUED | OUTPATIENT
Start: 2021-02-09 | End: 2021-02-09 | Stop reason: HOSPADM

## 2021-02-09 RX ORDER — HYDRALAZINE HYDROCHLORIDE 50 MG/1
50 TABLET, FILM COATED ORAL 2 TIMES DAILY
Qty: 60 TABLET | Refills: 0 | Status: SHIPPED | OUTPATIENT
Start: 2021-02-09 | End: 2024-02-21

## 2021-02-09 RX ORDER — LOSARTAN POTASSIUM 50 MG/1
50 TABLET ORAL ONCE
Status: COMPLETED | OUTPATIENT
Start: 2021-02-09 | End: 2021-02-09

## 2021-02-09 RX ORDER — HYDROCHLOROTHIAZIDE 12.5 MG/1
25 CAPSULE ORAL DAILY
Qty: 60 CAPSULE | Refills: 0 | Status: SHIPPED | OUTPATIENT
Start: 2021-02-10 | End: 2023-06-05

## 2021-02-09 RX ORDER — FERROUS SULFATE 325(65) MG
325 TABLET ORAL DAILY
Status: DISCONTINUED | OUTPATIENT
Start: 2021-02-09 | End: 2021-02-09 | Stop reason: HOSPADM

## 2021-02-09 RX ORDER — HYDRALAZINE HYDROCHLORIDE 50 MG/1
50 TABLET, FILM COATED ORAL ONCE
Status: COMPLETED | OUTPATIENT
Start: 2021-02-09 | End: 2021-02-09

## 2021-02-09 RX ORDER — HYDROCHLOROTHIAZIDE 12.5 MG/1
25 CAPSULE ORAL DAILY
Status: DISCONTINUED | OUTPATIENT
Start: 2021-02-09 | End: 2021-02-09 | Stop reason: HOSPADM

## 2021-02-09 RX ORDER — HYDRALAZINE HYDROCHLORIDE 50 MG/1
50 TABLET, FILM COATED ORAL 2 TIMES DAILY
Status: DISCONTINUED | OUTPATIENT
Start: 2021-02-09 | End: 2021-02-09 | Stop reason: HOSPADM

## 2021-02-09 RX ORDER — LOSARTAN POTASSIUM 50 MG/1
50 TABLET ORAL 2 TIMES DAILY
Qty: 60 TABLET | Refills: 0 | Status: SHIPPED | OUTPATIENT
Start: 2021-02-09 | End: 2023-06-05

## 2021-02-09 RX ADMIN — CEFAZOLIN 1 G: 1 INJECTION, POWDER, FOR SOLUTION INTRAMUSCULAR; INTRAVENOUS at 09:32

## 2021-02-09 RX ADMIN — CEFAZOLIN 1 G: 1 INJECTION, POWDER, FOR SOLUTION INTRAMUSCULAR; INTRAVENOUS at 01:11

## 2021-02-09 RX ADMIN — OMEPRAZOLE 40 MG: 20 CAPSULE, DELAYED RELEASE ORAL at 09:33

## 2021-02-09 RX ADMIN — ACETAMINOPHEN 650 MG: 325 TABLET, FILM COATED ORAL at 05:09

## 2021-02-09 RX ADMIN — FERROUS SULFATE TAB 325 MG (65 MG ELEMENTAL FE) 325 MG: 325 (65 FE) TAB at 10:16

## 2021-02-09 RX ADMIN — HYDRALAZINE HYDROCHLORIDE 10 MG: 20 INJECTION INTRAMUSCULAR; INTRAVENOUS at 06:57

## 2021-02-09 RX ADMIN — HYDROCHLOROTHIAZIDE 25 MG: 12.5 CAPSULE ORAL at 09:33

## 2021-02-09 RX ADMIN — ACETAMINOPHEN 650 MG: 325 TABLET, FILM COATED ORAL at 01:12

## 2021-02-09 RX ADMIN — ACETAMINOPHEN 650 MG: 325 TABLET, FILM COATED ORAL at 09:37

## 2021-02-09 RX ADMIN — HYDRALAZINE HYDROCHLORIDE 50 MG: 50 TABLET, FILM COATED ORAL at 10:17

## 2021-02-09 RX ADMIN — HYDRALAZINE HYDROCHLORIDE 50 MG: 50 TABLET, FILM COATED ORAL at 05:08

## 2021-02-09 RX ADMIN — LOSARTAN POTASSIUM 50 MG: 50 TABLET, FILM COATED ORAL at 10:16

## 2021-02-09 RX ADMIN — LEVOTHYROXINE SODIUM 50 MCG: 50 TABLET ORAL at 06:47

## 2021-02-09 ASSESSMENT — MIFFLIN-ST. JEOR: SCORE: 1268.9

## 2021-02-09 ASSESSMENT — ACTIVITIES OF DAILY LIVING (ADL)
ADLS_ACUITY_SCORE: 15

## 2021-02-09 NOTE — DISCHARGE INSTRUCTIONS
Discharge Instructions for Pacemaker Implantation  You have had a procedure to insert a pacemaker. Once inside your body, this small electronic device helps keep your heart from beating too slowly. A pacemaker can t fix existing heart problems. But it can help you feel better and have more energy. As you recover, follow all of the instructions you are given, including those below.  Activity    Follow the instructions you are given about limiting your activity.    Do not raise your arm on the incision side above shoulder level for 3 weeks. This gives the device lead wires time to attach securely inside your heart.    Ask your doctor when you can expect to return to work.    You can still exercise. It s good for your body and your heart. Talk with your doctor about an exercise plan.  Other Precautions    Follow your doctor's directions carefully for wound care. You may remove the outer dressing in 3 - 4 days. Leave the steri-strips in place; these will be removed at your 1 week follow-up. Never put any creams, lotions, or products like peroxide on an incision unless your doctor tells you to.     You may shower once the outer dressing has been removed.     Before you receive any treatment, tell all health care providers (including your dentist) that you have a pacemaker.    You will be given an ID card that contains information about your pacemaker. Always carry this card with you. You can show this card if your pacemaker sets off a metal detector. You should also show it to avoid screening with a hand-held security wand.    Keep your cell phone away from your pacemaker. Don t carry the phone in your shirt pocket, even when it s turned off.    Avoid strong magnets. Examples are those used in MRIs or in hand-held security wands.    Avoid strong electrical fields. Examples are those made by radio transmitting towers,  ham  radios, and heavy-duty electrical equipment.    Avoid leaning over the open lai of a running car.  A running engine creates an electrical field. Most household and yard appliances will not cause any problems. If you use any large power tools, such as an industrial , talk with your doctor.   Follow-Up    Follow up in the device clinic as scheduled.     1 week pacemaker check: Monday 2/15/2021, 2:00pm    [facility]: [address], [address], [place] [address]    Make regular follow-up appointments with your device clinic. They will check the pacemaker to make sure it s working properly.  When to Call Your Device Clinic [phone]  Call your Device Clinic if you have any of the following:    Dizziness    Chest pain    Lack of energy    Fainting spells    Rapid pulse or pounding heartbeat    Shortness of breath    Increased pain around your pacemaker    Fever above 100.4 F (38 C) or other signs of infection (redness, swelling, drainage, or warmth at the incision site)     3380-4609 [employer]. [address] [place] [address]. All rights reserved. This information is not intended as a substitute for professional medical care. Always follow your healthcare professional's instructions.    [facility] in [place]: [phone]  Device Clinic (Monday to Friday, 8am-4pm): [phone]  *The device clinic is closed on weekends and holidays.  Any calls received during this time will be answered on the next business  day. For any urgent questions after hours, please call the main clinic number and you will be put in contact with the cardiologist on call.

## 2021-02-09 NOTE — PROGRESS NOTES
Assessment and Plan:   Hypertension: currently on hydralazine, losartan. Will increase dose of losartan, change hydralazine to bid and add hydrochlorothiazide.   Labs show K 4.5, HCO3 25, Cr 1.04,   Ok for discharge and follow up in our office with nurse practitioner in 2 weeks or so.             Interval History:   Bradycardia: S/P pacer.     Hypothyroidism      Anemia: she is somewhat Fe def. Start oral Fe.            Review of Systems:   C/O headache. No chest or abd pain. Taking po well. Ambulating without problems.          Medications:       ceFAZolin  1 g Intravenous Q8H     hydrALAZINE  50 mg Oral BID     hydrochlorothiazide  25 mg Oral Daily     levothyroxine  50 mcg Oral QAM AC     losartan  50 mg Oral BID     omeprazole  40 mg Oral Daily     traMADol  25 mg Oral Once         Current active medications and PTA medications reviewed, see medication list for details.            Physical Exam:   Vitals were reviewed  Patient Vitals for the past 24 hrs:   BP Temp Temp src Pulse Resp SpO2 Weight   02/09/21 0729 -- (P) 97.8  F (36.6  C) (P) Oral (P) 75 (P) 16 (P) 95 % --   02/09/21 0654 (!) 188/93 -- -- 78 -- -- --   02/09/21 0554 -- -- -- -- 14 -- --   02/09/21 0550 -- -- -- -- -- -- 77.6 kg (171 lb)   02/09/21 0509 -- -- -- -- 16 -- --   02/09/21 0411 (!) 173/83 97.9  F (36.6  C) Oral 69 16 93 % --   02/09/21 0157 -- -- -- -- 16 -- --   02/09/21 0112 (!) 165/81 97.7  F (36.5  C) Oral 81 16 92 % --   02/08/21 2000 -- -- -- -- 16 -- --   02/08/21 1900 (!) 166/77 -- -- 76 -- -- --   02/08/21 1859 -- -- -- -- 16 -- --   02/08/21 1759 -- -- -- -- 16 -- --   02/08/21 1750 (!) 174/80 -- -- 87 -- -- --   02/08/21 1715 (!) 190/95 -- -- 79 -- -- --   02/08/21 1600 (!) 152/90 98  F (36.7  C) Oral 76 16 97 % --   02/08/21 1455 136/73 -- -- 82 -- -- --   02/08/21 1405 125/75 -- -- 86 -- -- --   02/08/21 1404 125/75 -- -- -- -- -- --   02/08/21 1310 -- -- -- -- -- 96 % --   02/08/21 1300 (!) 158/85 -- -- 80 -- 98 %  --   21 1230 -- -- -- -- -- 94 % --   21 1200 (!) 156/88 -- -- 71 -- -- --   21 1130 (!) 158/90 -- -- 75 -- 97 % --   21 1115 (!) 168/81 -- -- 74 -- 95 % --   21 1100 (!) (P) 158/84 -- -- (P) 75 -- (P) 98 % --   21 1045 (!) (P) 178/81 -- -- (P) 80 -- (P) 98 % --       Temp:  [97.7  F (36.5  C)-98  F (36.7  C)] (P) 97.8  F (36.6  C)  Pulse:  [69-87] (P) 75  Resp:  [14-16] (P) 16  BP: (125-190)/(73-95) 188/93  SpO2:  [92 %-98 %] (P) 95 %    Temperatures:  Current - Temp: (P) 97.8  F (36.6  C); Max - Temp  Av.9  F (36.6  C)  Min: 97.7  F (36.5  C)  Max: 98  F (36.7  C)  Respiration range: Resp  Avg: 15.8  Min: 14  Max: 16  Pulse range: Pulse  Av.8  Min: 69  Max: 87  Blood pressure range: Systolic (24hrs), Av , Min:125 , Max:190   ; Diastolic (24hrs), Av, Min:73, Max:95    Pulse oximetry range: SpO2  Av.7 %  Min: 92 %  Max: 98 %    I/O last 3 completed shifts:  In: 800 [P.O.:600; I.V.:200]  Out: 500 [Urine:500]      Intake/Output Summary (Last 24 hours) at 2021 0935  Last data filed at 2021 0630  Gross per 24 hour   Intake 700 ml   Output 450 ml   Net 250 ml       Alert and responsive  Lungs with clear BS  Cor RRR nl S1 S2 2/6 sys M  LE no edema       Wt Readings from Last 4 Encounters:   21 77.6 kg (171 lb)          Data:          Lab Results   Component Value Date     2021     2021     2021    Lab Results   Component Value Date    CHLORIDE 111 2021    CHLORIDE 112 2021    CHLORIDE 110 2021    Lab Results   Component Value Date    BUN 24 2021    BUN 28 2021    BUN 29 2021      Lab Results   Component Value Date    POTASSIUM 4.5 2021    POTASSIUM 4.1 2021    POTASSIUM 4.1 2021    Lab Results   Component Value Date    CO2 25 2021    CO2 27 2021    CO2 25 2021    Lab Results   Component Value Date    CR 1.04 2021    CR 1.13 2021     CR 1.50 02/07/2021        Recent Labs   Lab Test 02/08/21  0601 02/07/21  1923 02/06/21  0329   WBC 10.9 11.8* 10.9   HGB 13.4 13.5 13.6   HCT 42.7 43.0 43.9   MCV 82 83 83    404 383     No results for input(s): AST, ALT, GGT, ALKPHOS, BILITOTAL, BILICONJ, BILIDIRECT, ERICK in the last 57735 hours.    Invalid input(s): BILIRUBININDIRECT    Recent Labs   Lab Test 02/09/21  0533 02/08/21  0601 02/06/21  0329   MAG 2.2 2.2 2.6*     No results for input(s): PHOS in the last 42722 hours.  Recent Labs   Lab Test 02/09/21  0533 02/08/21  0601 02/07/21  0611   BISI 9.1 9.0 9.3       Lab Results   Component Value Date    BISI 9.1 02/09/2021     Lab Results   Component Value Date    WBC 10.9 02/08/2021    HGB 13.4 02/08/2021    HCT 42.7 02/08/2021    MCV 82 02/08/2021     02/08/2021     Lab Results   Component Value Date     02/09/2021    POTASSIUM 4.5 02/09/2021    CHLORIDE 111 (H) 02/09/2021    CO2 25 02/09/2021    GLC 82 02/09/2021     Lab Results   Component Value Date    BUN 24 02/09/2021    CR 1.04 02/09/2021     Lab Results   Component Value Date    MAG 2.2 02/09/2021     No results found for: PHOS    Creatinine   Date Value Ref Range Status   02/09/2021 1.04 0.52 - 1.04 mg/dL Final   02/08/2021 1.13 (H) 0.52 - 1.04 mg/dL Final   02/07/2021 1.50 (H) 0.52 - 1.04 mg/dL Final   02/06/2021 1.10 (H) 0.52 - 1.04 mg/dL Final       Attestation:  I have reviewed today's vital signs, notes, medications, labs and imaging.     Israel Goodson MD

## 2021-02-09 NOTE — PLAN OF CARE
Pt is A&Ox4, VSS on RA ex. hypertension, systolic BP 160s-170s. Lung sounds clear. Tele 100% V paced. Up independently in room, voiding in bathroom. 24-hour urine collection finished at 1940 and sent to lab. Regular diet. CMS intact, L upper anterior chest dressing CDI. Pain managed with PRN Tylenol. IV SL. Will continue to follow plan of care.

## 2021-02-09 NOTE — PROGRESS NOTES
"[facility]    Electrophysiology Progress Note    Date of Service (when I saw the patient): 02/09/2021     Assessment & Plan   Tameka Avalos is a 65 year old female with h/o HTN and treated hypothyroidism admitted 2/6/2021 from [facility] ER for 2:1 HB after presenting with fatigue, nausea and headache. Transferred to [facility] for consideration of PPM implantation, which was done 2/8.    1. Intermittent Sx'c 2:1 Heart Block; EF 60-65% -   - HRs in 30-40s  - No AV yazmin blocking agents on admission. TSH wnl. No RFs for Lymes  - Noted to have \"dizziness\" occasionally, but otherwise has felt \"good\" with nl activity levels and doing water aerobics. No syncope/falls    - Echo 2/6/2021 with nl EF 60-65%    - Now s/p dual chamber [employer] PPM (MRI compatible) with Dr. Lozano 2/8/2021, set dDD 60/130   Post procedural CXR PENDING   Device interrogation (Bar) with 4% AP and 100% . Nl leads     PLAN:   1. OK to be Discharged from EP standpoint if CXR shows no PTX   2. She agrees will do at least first follow-up with us and then may transfer PPM cares to [facility]    3. Reviewed arm restrictions - no water aerobics until 1 week check and THEN may be advised to avoid x 3 weeks (unless can keep L arm down)    2. HTN -   - PTA on losartan 25 mg daily  - BP here fluctuating between 130-230s  - Echo OK  - Neck CTA without sig d but mild narrowing of pL SCA noted. Equal pulses on exam  - Renal US negative for stenosis    - Nephrology (Dr. Goodson) has seen. Hydralazine 50 mg TID started and losartan increased to 50 mg daily. Dr. Goodson rec'd possibly adding hydrochlorothiazide if BP remained high      PLAN:   1. BP tx per Nephrology/Hospitalist teams. Following with Nephro in 2 weeks as OP per Dr. Goodson.       ZAHRA Jenkins-JOSETTE    Interval History   Feeling \"good\" without c/o CP, SOB. Minimal PPM site \"discomfort.\" No HA/dizziness    Physical Exam   Temp: 97.9  F (36.6  C) Temp " src: Oral BP: (!) 188/93 Pulse: 78   Resp: 14 SpO2: 93 % O2 Device: None (Room air) Oxygen Delivery: (P) 2 LPM  Vitals:    02/07/21 0548 02/08/21 0430 02/09/21 0550   Weight: 78.9 kg (174 lb) 77.5 kg (170 lb 14.4 oz) 77.6 kg (171 lb)     Vital Signs with Ranges  Temp:  [97.7  F (36.5  C)-98.2  F (36.8  C)] 97.9  F (36.6  C)  Pulse:  [41-87] 78  Resp:  [14-16] 14  BP: (125-206)/(73-95) 188/93  SpO2:  [92 %-98 %] 93 %  I/O last 3 completed shifts:  In: 800 [P.O.:600; I.V.:200]  Out: 300 [Urine:300]    Telemetry: ASVP     Constitutional: NAD  Respiratory: CTA B  Cardiovascular: Regular. Still with JAMES   Musculoskeletal: No edema    Medications       ceFAZolin  1 g Intravenous Q8H     hydrALAZINE  50 mg Oral Q8H CAL     levothyroxine  50 mcg Oral QAM AC     losartan  50 mg Oral Daily     omeprazole  40 mg Oral Daily     traMADol  25 mg Oral Once       Data   I personally reviewed the EKG tracing showing ASVP 70 bpm.  Results for orders placed or performed during the hospital encounter of 02/06/21 (from the past 24 hour(s))   EP Device    Narrative    PROCEDURES PERFORMED:  1. Conscious sedation.  2. Cardiac fluoroscopy.  3. Dual-chamber permanent pacemaker implantation.    INDICATION: Symptomatic bradycardia secondary to complete AV block.    HPI: 65-year old lady with a history of hypertension and hypothyroidism,   who presented symptomatic complete AV block.  No reversible cause were   identified and she was referred for permanent pacemaker implantation.      Risks and benefits of the procedure were reviewed including but not   limited to arrhythmia, pain, infection, bleeding, skin damage from ionized   radiation, kidney damage or allergic reactions to conscious dye, injury to   the neighboring vascular structures, need for emergent cardiopulmonary   resuscitation, heart attack, stroke or death. Alternatives were discussed   including doing nothing. All questions were answered to the patient's   satisfaction. Informed  consent was obtained and patient wished to proceed.    FLUOROSCOPY DATA:  Fluoro time:  1 minute and 51 seconds.  Radiation dose (AK): 9 mGy.  Dose-area product (DAP):  833 mGy.cm2.    DESCRIPTION OF PROCEDURE: After written informed consent was obtained,   patient was brought to the EP lab. An intravenous infusion of prophylactic   antibiotic was begun. The chest was sterilely prepped from the level of   iliac crest to level of the chin with antibiotic soap and disinfectant,   draped appropriately. Following infiltration with 1% lidocaine, an   incision was made parallel to and 1 cm beneath the clavicle and extended   across the deltopectoral groove. Using blunt dissection, the excision was   extended down the anterior pectoral fascia. Using blunt and sharp   dissection, a pocket was developed parallel to the fascia and extended   inferiorly.    Two pocket punctures via fluoroscopy guidance and modified Seldinger   technique was used to attain access into the left subclavian vein. A 6   [place] sheath was inserted over the wire. The right ventricular lead was   advanced under fluoroscopy and a secure location found. The lead was   fixated.  Stimulation and sensing thresholds were found to be   satisfactory. No diaphragmatic stimulation was noticed with high output   pacing. The lead was sutured to the underlying pectoral muscle with   interrupted 0 silk suture over a plastic collar. A 6 [place] sheath was   inserted over the wire. The right atrial lead was advanced under   fluoroscopy and secure location found. The lead was fixated. Stimulation   and sensing thresholds were found to be satisfactory. No diaphragmatic   stimulation was noted with high output pacing. The lead was sutured to the   underlying pectoral muscle with interrupted 0 silk suture over a plastic   collar.    After irrigation with saline solution, the pocket was inspected, no   bleeding was seen. The generator was connected to the leads and  inserted   into the pocket. The wound was closed with two layers of subcutaneous   sutures.    PACEMAKER GENERATOR:  [employer], model L331, serial #[id number].    LEAD INFORMATION:  Right atrial lead: [employer], model 7840, serial #[id number].  Right ventricular lead: [employer], model 7841, serial #[id number].    MEASURED DATA:  Right atrial lead: Sensing 4.7 mV, threshold 1.0 volts at 0.5 msec,   impedance 593 ohms.  Right ventricular lead: Sensing 10.4 mV, threshold 0.7 volt at 0.5 msec,   impedance 894 ohms.    IMPRESSION:  1. Successful dual chamber pacemaker implantation in left infraclavicular   region above the pectoral fascia.  2. Bradycardia pacing set to DDD .    RECOMMENDATIONS:  1. Avoid vascular access to the left jugular and subclavian veins.  2. Keep wound clean, dry and covered for seven days.  3. PA and lateral chest x-ray to rule out dislodgement.  4. Device interrogation prior to discharge.  5. Left arm in sling overnight and then off in the morning.  6. May start oral medications. Avoid IV or subcutaneous heparin for at   least two weeks. If heparin must be used, please contract the procedural   team to discuss.  7. Wound care as follows:  a) Do not get incision wet for three days.  b) Leave the Steri-Strips in place, allow to come off naturally. If they   do not come off in two weeks, you may remove them.  c) Check incision daily and call if they notice one of the following:   redness, drainage (pus or blood), swelling, warmth or if you develop   fever.  If you have questions regarding wound care, please contact our office.     Steven Lozano MD   Basic metabolic panel   Result Value Ref Range    Sodium 141 133 - 144 mmol/L    Potassium 4.5 3.4 - 5.3 mmol/L    Chloride 111 (H) 94 - 109 mmol/L    Carbon Dioxide 25 20 - 32 mmol/L    Anion Gap 5 3 - 14 mmol/L    Glucose 82 70 - 99 mg/dL    Urea Nitrogen 24 7 - 30 mg/dL    Creatinine 1.04 0.52 - 1.04 mg/dL    GFR Estimate 56 (L)  >60 mL/min/[1.73_m2]    GFR Estimate If Black 64 >60 mL/min/[1.73_m2]    Calcium 9.1 8.5 - 10.1 mg/dL   Iron and iron binding capacity   Result Value Ref Range    Iron 44 35 - 180 ug/dL    Iron Binding Cap 344 240 - 430 ug/dL    Iron Saturation Index 13 (L) 15 - 46 %   Ferritin   Result Value Ref Range    Ferritin 32 8 - 252 ng/mL   Magnesium   Result Value Ref Range    Magnesium 2.2 1.6 - 2.3 mg/dL

## 2021-02-09 NOTE — PLAN OF CARE
Discharge home with  tolerated food, patient has headache gave tylenol. Pacemaker site dry no bleeding no hematoma.

## 2021-02-09 NOTE — PLAN OF CARE
Patient is up in the room independent, tolerated food, head pain posterior  right side gave tylenol. Pacemaker site is dry and intact no bleeding no hematoma. Blood pressure elevated collecting 24 hour urine.

## 2021-02-09 NOTE — DISCHARGE SUMMARY
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  Hypothyroidism  GERD  COVID 19 screening - PCR NEGATIVE 2/5/21      Follow-ups Needed After Discharge   Follow-up Appointments     Follow-up and recommended labs and tests       Cardiology as scheduled  Nephrology in 2 weeks  PCP as needed for hospitalization follow up             Unresulted Labs Ordered in the Past 30 Days of this Admission     Date and Time Order Name Status Description    2/8/2021 0001 Metanephrines Plasma Free In process     2/8/2021 0001 Aldosterone Renin Ratio In process     2/8/2021 0001 Renin activity In process     2/7/2021 1724 Metanephrine random or 24 hr urine In process       These results will be followed up by Nephrology    Discharge Disposition   Discharged to home with family  Condition at discharge: Stable      Hospital Course         Tameka Avalos is a 65 year old female with PMHx of hypertension, hypothyroidism and GERD who was admitted from [facility] ED under observation status on 2/6/2021 for evaluation of bradycardia dt second degree AVB and need for ppm placement. Was also noted to have significant hypertension. See ED note in Care Everywhere for specifics of ED visit.      Bradycardia, with 2:1 AVB  S/P dual chamber PPM 2/8/21  Initially presented to ED for evaluation of headache. BPs variable. No hx of syncope, dizziness/lightheadedness or falls. Typically does water aerobics during the week without difficulty. In ED, was bradycardic, HRs as low as 29-38. BPs variable -- as high as 200s. Not on ccb or bb at baseline. Transferred to [facility] for ongoing cardiac evaluation. Hasn't required pacing or atropine.  Echocardiogram showed nl EF, no WMAs, no significant valve disease (trace to mild MR only).   - cardiology following, standard post PPM placement protocol  - device check and CXR - within normal limits  - cardiology okay with discharge - initial follow up with our system with device clinic in 1 week, subsequently she may be transferring cares to [facility].   - restrictions as delineated by Cardiology     Accelerated Hypertension  Headache - improved/resolved  Takes losartan 25mg daily. Reported BPs had been stable when seen in PCP's clinic ([facility] in [place]). Had been following with PCP for evaluation of 3-4wk hx of headaches. No relief in HAs after trial of sumatriptan. Hasn't been taking NSAIDs.  No vision changes (had recently seen eye doctor who had no concerns). BPs notably variable in the ED -- as high as 277/157 on admission, then seemed to settle around 160-180s systolic. Head CT, CTA head/neck was neg for acute pathology. On admission, losartan was continued and prn hydralazine ordered. Started workup for secondary causes of hypertension - renal US neg for renal artery stenosis, thyroid studies relatively unremarkable, echo unremarkable.   - holding ccb and bb dt above  - Nephrology consulted and appreciated - BP improved on day of discharge. As below, losartan 50 bid, hydralazine bid, and and hydrochlorothiazide added. Follow up in nephrology clinic in 2 weeks for ongoing care and results of pending labs.      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 Cr 1.1 on admission here. Losartan continued as this appeared to be her baseline.   - Cr trending up: 1.1 -- 1.5 --> 1.1 today  - renal artery US neg for renal artery stenosis  - UA added on - culture pending, not appearing to be UTI, but indeterminate - urine cultures negative  - held losartan  yesterday, nephology managing as above with follow up in 2 weeks     Hypothyroidism  TSH nl in 12/2020 (per labs in Care Everywhere)  TSH mildly elevated at 4.73 but FT4 nl when checked this stay.  - conts on levothyroxine 50mcg daily   - follow up with PCP for ongoing monitoring     GERD  Chronic and stable on PPI          Consultations This Hospital Stay   CARDIOLOGY IP CONSULT  NEPHROLOGY IP CONSULT    Code Status   Prior    Time Spent on this Encounter   I, Oli Shay MD, personally saw the patient today and spent greater than 30 minutes discharging this patient.       Oli Shay MD  [facility] HEART [address], [address]  [facility] [address]  Phone: [phone]  ______________________________________________________________________    Physical Exam   Vital Signs: Temp: 97.8  F (36.6  C) Temp src: Oral BP: (!) 172/79 Pulse: 66   Resp: 16 SpO2: 95 % O2 Device: None (Room air)    Weight: 171 lbs 0 oz      Gen: NAD, pleasant  HEENT: Normocephalic, EOMI, MMM  Resp: no crackles,  no wheezes, no increased work of resp  CV: S1S2 heard, reg rhythm, reg rate, no pedal edema  Abdo: soft, nontender, nondistended, bowel sounds present  Ext: calves nontender, well perfused  Neuro: AAOx3, CN grossly intact, no facial asymmetry         Primary Care Physician   Eve Brewer    Discharge Orders      Reason for your hospital stay    Bradycardia and hypertension     Follow-up and recommended labs and tests     Cardiology as scheduled  Nephrology in 2 weeks  PCP as needed for hospitalization follow up     Activity    Your activity upon discharge: activity as tolerated with standard post pacemaker protocol per cardiology     Discharge Instructions    Continue with medications as below - monitor your blood pressure at home with an upper arm cuff.  If you become symptomatic or your BP is low, contact your doctor or return to care.  Follow up with cardiology and nephrology as  scheduled.     Diet    Follow this diet upon discharge: Orders Placed This Encounter      Regular Diet Adult       Significant Results and Procedures   Most Recent 3 CBC's:  Recent Labs   Lab Test 02/09/21  1117 02/08/21  0601 02/07/21  1923 02/06/21  0329   WBC  --  10.9 11.8* 10.9   HGB 13.5 13.4 13.5 13.6   MCV  --  82 83 83   PLT  --  381 404 383     Most Recent 3 BMP's:  Recent Labs   Lab Test 02/09/21  0533 02/08/21  0601 02/07/21  0611    142 141   POTASSIUM 4.5 4.1 4.1   CHLORIDE 111* 112* 110*   CO2 25 27 25   BUN 24 28 29   CR 1.04 1.13* 1.50*   ANIONGAP 5 3 6   BISI 9.1 9.0 9.3   GLC 82 98 87     Most Recent 3 Troponin's:No lab results found.  Most Recent 6 Bacteria Isolates From Any Culture (See EPIC Reports for Culture Details):  Recent Labs   Lab Test 02/07/21  1615   CULT 10,000 to 50,000 colonies/mL  mixed urogenital alex  Susceptibility testing not routinely done       Most Recent TSH and T4:  Recent Labs   Lab Test 02/06/21  0329   TSH 4.73*   T4 0.79     Most Recent Urinalysis:  Recent Labs   Lab Test 02/07/21  1615   COLOR Yellow   APPEARANCE Slightly Cloudy   URINEGLC Negative   URINEBILI Negative   URINEKETONE Negative   SG 1.030   UBLD Negative   URINEPH 5.5   PROTEIN 30*   NITRITE Negative   LEUKEST Large*   RBCU 2   WBCU 20*   ,   Results for orders placed or performed during the hospital encounter of 02/06/21   US Renal Complete w Doppler Complete    Narrative    RENAL ULTRASOUND   2/6/2021 10:13 AM     HISTORY: Evaluate for renal artery stenosis    COMPARISON: None.    FINDINGS:    Right Kidney: The kidney measures 9.7 x 4.9 x 4.2 cm. Normal cortical  thickness.  No renal masses are seen.  There is no hydronephrosis or  renal calculus.     Left Kidney: The left kidney measures 9.1 x 5.2 x 4.6 cm. Normal  cortical thickness.  No renal masses are seen.  There is no  hydronephrosis or renal calculus.   DUPLEX ARTERIAL ULTRASOUND FINDINGS:    RIGHT RENAL ARTERY: Normal low resistance  renal waveforms are brisk  systolic upstroke.  SYSTOLIC VELOCITIES cm/s (ratio):  Proximal: 174/32; (1.3)             Mid: 110/21; (0.8)              Distal: 108/16; (0.8)                     LEFT RENAL ARTERY: Normal low resistance renal waveforms are brisk  systolic upstroke.  SYSTOLIC VELOCITIES cm/s (ratio):  Proximal: 79/12; (0.6)                 Mid: 51/9; (0.4)                  Distal: 36/8; (0.3)                     AORTA: 139 and 28 cm/s    Arcuate arterial resistive indices range from 0.79-0.81 on the right  and 0.75-0.79 on the left.    The renal veins are patent with normal waveforms bilaterally.      Impression    IMPRESSION:  1. Normal sonographic appearance of the kidneys bilaterally.  2. No evidence of significant renal artery stenosis.    JESSICA SALVADOR MD   X-ray Chest 2 vws*    Narrative    XR CHEST 2 VW   2/9/2021 9:06 AM     HISTORY: Status post pacer/ICD    COMPARISON: None.      Impression    IMPRESSION: No acute cardiopulmonary disease. Left chest wall  pacemaker leads over the right atrium and right ventricle. No  pneumothorax.    ADAM FROST MD   EP Device    Narrative    PROCEDURES PERFORMED:  1. Conscious sedation.  2. Cardiac fluoroscopy.  3. Dual-chamber permanent pacemaker implantation.    INDICATION: Symptomatic bradycardia secondary to complete AV block.    HPI: 65-year old lady with a history of hypertension and hypothyroidism,   who presented symptomatic complete AV block.  No reversible cause were   identified and she was referred for permanent pacemaker implantation.      Risks and benefits of the procedure were reviewed including but not   limited to arrhythmia, pain, infection, bleeding, skin damage from ionized   radiation, kidney damage or allergic reactions to conscious dye, injury to   the neighboring vascular structures, need for emergent cardiopulmonary   resuscitation, heart attack, stroke or death. Alternatives were discussed   including doing nothing. All  questions were answered to the patient's   satisfaction. Informed consent was obtained and patient wished to proceed.    FLUOROSCOPY DATA:  Fluoro time:  1 minute and 51 seconds.  Radiation dose (AK): 9 mGy.  Dose-area product (DAP):  833 mGy.cm2.    DESCRIPTION OF PROCEDURE: After written informed consent was obtained,   patient was brought to the EP lab. An intravenous infusion of prophylactic   antibiotic was begun. The chest was sterilely prepped from the level of   iliac crest to level of the chin with antibiotic soap and disinfectant,   draped appropriately. Following infiltration with 1% lidocaine, an   incision was made parallel to and 1 cm beneath the clavicle and extended   across the deltopectoral groove. Using blunt dissection, the excision was   extended down the anterior pectoral fascia. Using blunt and sharp   dissection, a pocket was developed parallel to the fascia and extended   inferiorly.    Two pocket punctures via fluoroscopy guidance and modified Seldinger   technique was used to attain access into the left subclavian vein. A 6   Bolivian sheath was inserted over the wire. The right ventricular lead was   advanced under fluoroscopy and a secure location found. The lead was   fixated.  Stimulation and sensing thresholds were found to be   satisfactory. No diaphragmatic stimulation was noticed with high output   pacing. The lead was sutured to the underlying pectoral muscle with   interrupted 0 silk suture over a plastic collar. A 6 Bolivian sheath was   inserted over the wire. The right atrial lead was advanced under   fluoroscopy and secure location found. The lead was fixated. Stimulation   and sensing thresholds were found to be satisfactory. No diaphragmatic   stimulation was noted with high output pacing. The lead was sutured to the   underlying pectoral muscle with interrupted 0 silk suture over a plastic   collar.    After irrigation with saline solution, the pocket was inspected, no    bleeding was seen. The generator was connected to the leads and inserted   into the pocket. The wound was closed with two layers of subcutaneous   sutures.    PACEMAKER GENERATOR:  [employer], model L331, serial #[id number].    LEAD INFORMATION:  Right atrial lead: [employer], model 7840, serial #[id number].  Right ventricular lead: [employer], model 7841, serial #[id number].    MEASURED DATA:  Right atrial lead: Sensing 4.7 mV, threshold 1.0 volts at 0.5 msec,   impedance 593 ohms.  Right ventricular lead: Sensing 10.4 mV, threshold 0.7 volt at 0.5 msec,   impedance 894 ohms.    IMPRESSION:  1. Successful dual chamber pacemaker implantation in left infraclavicular   region above the pectoral fascia.  2. Bradycardia pacing set to DDD .    RECOMMENDATIONS:  1. Avoid vascular access to the left jugular and subclavian veins.  2. Keep wound clean, dry and covered for seven days.  3. PA and lateral chest x-ray to rule out dislodgement.  4. Device interrogation prior to discharge.  5. Left arm in sling overnight and then off in the morning.  6. May start oral medications. Avoid IV or subcutaneous heparin for at   least two weeks. If heparin must be used, please contract the procedural   team to discuss.  7. Wound care as follows:  a) Do not get incision wet for three days.  b) Leave the Steri-Strips in place, allow to come off naturally. If they   do not come off in two weeks, you may remove them.  c) Check incision daily and call if they notice one of the following:   redness, drainage (pus or blood), swelling, warmth or if you develop   fever.  If you have questions regarding wound care, please contact our office.     Steven Lozano MD   Echocardiogram Complete    Narrative    [id number]  [id number]  [id number]  [id number]^WHITE^BONIFACIO^SARA           [facility]  Echocardiography Laboratory  [address] [place] [id number]        Name: MICK SMITH  MRN: [id number]  :  1955  Study Date: 02/06/2021 07:59 AM  Age: 65 yrs  Gender: Female  Patient Location: [facility]  Reason For Study: AV block, unspecified  Ordering Physician: BONIFACIO MARADIAGA  Referring Physician: Greta primary care provider on file  Performed By: Merlyn Murrell     BSA: 1.8 m2  Height: 62 in  Weight: 172 lb  HR: 40  BP: 182/88 mmHg  _____________________________________________________________________________  __        Procedure  Complete Portable Echo Adult. Optison (NDC #6201-3537) given intravenously.  _____________________________________________________________________________  __        Interpretation Summary     Possible advanced AV block.  Left ventricular systolic function is normal.  The visual ejection fraction is estimated at 60-65%.  The left ventricle is normal in size.  The right ventricle is normal in structure, function and size.  There is trace to mild mitral regurgitation.     No old studies available for comparison.  _____________________________________________________________________________  __        Left Ventricle  The left ventricle is normal in size. There is normal left ventricular wall  thickness. Left ventricular systolic function is normal. The visual ejection  fraction is estimated at 60-65%. Left ventricular diastolic function is  indeterminate. No regional wall motion abnormalities noted. There is no  thrombus seen in the left ventricle.     Right Ventricle  The right ventricle is normal in structure, function and size. There is no  mass or thrombus in the right ventricle.     Atria  Normal left atrial size. Right atrial size is normal. There is no atrial shunt  seen. The left atrial appendage is not well visualized.     Mitral Valve  The mitral valve leaflets appear normal. There is no evidence of stenosis,  fluttering, or prolapse. There is trace to mild mitral regurgitation. There is  no mitral valve stenosis.        Tricuspid Valve  Normal tricuspid valve. The right ventricular  systolic pressure is  approximated at 25.4 mmHg plus the right atrial pressure. Right ventricle  systolic pressure estimate normal. There is mild (1+) tricuspid regurgitation.  There is no tricuspid stenosis.     Aortic Valve  The aortic valve is trileaflet. No aortic regurgitation is present. No aortic  stenosis is present.     Pulmonic Valve  Normal pulmonic valve. There is no pulmonic valvular regurgitation. There is  no pulmonic valvular stenosis.     Vessels  The aortic root is normal size. Normal size descending aorta. The inferior  vena cava is normal. The pulmonary artery is normal size.     Pericardium  The pericardium appears normal. There is no pleural effusion.        Rhythm  Possible advanced AV block.  _____________________________________________________________________________  __  MMode/2D Measurements & Calculations  IVSd: 0.95 cm     LVIDd: 4.9 cm  LVIDs: 3.1 cm  LVPWd: 0.90 cm  FS: 37.1 %  LV mass(C)d: 158.1 grams  LV mass(C)dI: 88.2 grams/m2  Ao root diam: 2.9 cm  LA dimension: 3.5 cm  asc Aorta Diam: 3.1 cm  LA/Ao: 1.2  LA Volume (BP): 45.8 ml  LA Volume Index (BP): 25.6 ml/m2  RWT: 0.37           Doppler Measurements & Calculations  MV E max jihan: 76.4 cm/sec  MV dec slope: 485.9 cm/sec2  PA acc time: 0.06 sec  TR max jihan: 251.9 cm/sec  TR max P.4 mmHg           _____________________________________________________________________________  __           Report approved by: Dr. Jamal Goode 2021 10:35 AM            Discharge Medications   Discharge Medication List as of 2021 11:12 AM      START taking these medications    Details   hydrALAZINE (APRESOLINE) 50 MG tablet Take 1 tablet (50 mg) by mouth 2 times daily, Disp-60 tablet, R-0, E-Prescribe      hydrochlorothiazide (MICROZIDE) 12.5 MG capsule Take 2 capsules (25 mg) by mouth daily, Disp-60 capsule, R-0, E-Prescribe         CONTINUE these medications which have CHANGED    Details   losartan (COZAAR) 50 MG tablet Take 1  tablet (50 mg) by mouth 2 times daily, Disp-60 tablet, R-0, E-Prescribe         CONTINUE these medications which have NOT CHANGED    Details   ferrous sulfate 140 (45 Fe) MG TBCR CR tablet Take 140 mg by mouth 2 times daily , Historical      levothyroxine (SYNTHROID/LEVOTHROID) 50 MCG tablet Take 50 mcg by mouth daily, Historical      neomycin-bacitracin-polymyxin (NEOSPORIN) 5-400-5000 ointment Apply topically daily as needed Historical      omeprazole (PRILOSEC) 40 MG DR capsule Take 40 mg by mouth daily, Historical      vitamin D3 (CHOLECALCIFEROL) 50 mcg (2000 units) tablet Take 1 tablet by mouth daily, Historical           Allergies   No Known Allergies

## 2021-02-10 LAB
ANNOTATION COMMENT IMP: NORMAL
METANEPHS SERPL-SCNC: 0.22 NMOL/L (ref 0–0.49)
NORMETANEPHRINE SERPL-SCNC: 0.74 NMOL/L (ref 0–0.89)

## 2021-02-10 NOTE — TELEPHONE ENCOUNTER
Received call back from patient and reviewed post pacemaker implant instructions as outlined in note by JOSEMANUEL Barajas.  Patient verbalized understanding of instructions and follow-up plan.  She will call the clinic with any questions, device clinic and main clinic phone numbers provided.    JOSEMANUEL Hairston

## 2021-02-10 NOTE — TELEPHONE ENCOUNTER
"Pt had [employer] dual chamber PPM implanted on Monday 2/8. She was discharged from the hospital yesterday on 2/9.     Called pt to discuss discharge instructions and 1 week appt, no answer, left VM to call back to device clinic to discuss. Awaiting return call.     Per [facility] note from yesterday: \"She agrees will do at least first follow-up with us and then may transfer PPM cares to [facility]\"  ___________________________________________________________    Post device implant discharge phone call.    No raising arm above shoulder on the side of implant for 3 weeks  Remove outer dressing 3 days after implant. May shower after outer dressing removed. Leave steri-strips in place, will be removed at 1 week device check  Limit driving for: 1 week (PPM)  Watch for redness, drainage, warmth, or fever. Call device clinic if any signs of infection.     1 week device check scheduled: Monday 2/15/2021, 2:00pmAshley    "

## 2021-02-11 LAB
INTERPRETATION ECG - MUSE: NORMAL
RENIN PLAS-CCNC: 1 NG/ML/HR

## 2021-02-12 LAB
ALDOSTERONE RENIN RATIO: 4.8 (ref 0–25)
COLLECT DURATION TIME SPEC: 24 H
CREAT 24H UR-MRATE: 1029 MG/D (ref 500–1400)
CREAT UR-MCNC: 196 MG/DL
METANEPH 24H UR-MCNC: 226 UG/L
METANEPH 24H UR-MRATE: 119 UG/D (ref 36–229)
METANEPH+NORMETANEPH UR-IMP: ABNORMAL
METANEPH/CREAT 24H UR: 115 UG/G CRT (ref 0–300)
NORMETANEPHRINE 24H UR-MCNC: 862 UG/L
NORMETANEPHRINE 24H UR-MRATE: 453 UG/D (ref 95–650)
NORMETANEPHRINE/CREAT 24H UR: 440 UG/G CRT (ref 0–400)
SPECIMEN VOL ?TM UR: 525 ML

## 2021-02-15 ENCOUNTER — ANCILLARY PROCEDURE (OUTPATIENT)
Dept: CARDIOLOGY | Facility: CLINIC | Age: 66
End: 2021-02-15
Attending: INTERNAL MEDICINE
Payer: COMMERCIAL

## 2021-02-15 DIAGNOSIS — I44.2 COMPLETE ATRIOVENTRICULAR BLOCK (H): ICD-10-CM

## 2021-02-15 DIAGNOSIS — Z95.0 CARDIAC PACEMAKER IN SITU: ICD-10-CM

## 2021-02-15 PROCEDURE — 93280 PM DEVICE PROGR EVAL DUAL: CPT | Performed by: INTERNAL MEDICINE

## 2021-02-17 LAB
MDC_IDC_LEAD_IMPLANT_DT: NORMAL
MDC_IDC_LEAD_IMPLANT_DT: NORMAL
MDC_IDC_LEAD_LOCATION: NORMAL
MDC_IDC_LEAD_LOCATION: NORMAL
MDC_IDC_LEAD_LOCATION_DETAIL_1: NORMAL
MDC_IDC_LEAD_LOCATION_DETAIL_1: NORMAL
MDC_IDC_LEAD_MFG: NORMAL
MDC_IDC_LEAD_MFG: NORMAL
MDC_IDC_LEAD_MODEL: NORMAL
MDC_IDC_LEAD_MODEL: NORMAL
MDC_IDC_LEAD_POLARITY_TYPE: NORMAL
MDC_IDC_LEAD_POLARITY_TYPE: NORMAL
MDC_IDC_LEAD_SERIAL: NORMAL
MDC_IDC_LEAD_SERIAL: NORMAL
MDC_IDC_MSMT_BATTERY_STATUS: NORMAL
MDC_IDC_MSMT_LEADCHNL_RA_IMPEDANCE_VALUE: 518 OHM
MDC_IDC_MSMT_LEADCHNL_RA_PACING_THRESHOLD_AMPLITUDE: 0.7 V
MDC_IDC_MSMT_LEADCHNL_RA_PACING_THRESHOLD_PULSEWIDTH: 0.4 MS
MDC_IDC_MSMT_LEADCHNL_RA_SENSING_INTR_AMPL: 8.5 MV
MDC_IDC_MSMT_LEADCHNL_RV_IMPEDANCE_VALUE: 673 OHM
MDC_IDC_MSMT_LEADCHNL_RV_PACING_THRESHOLD_AMPLITUDE: 0.9 V
MDC_IDC_MSMT_LEADCHNL_RV_PACING_THRESHOLD_PULSEWIDTH: 0.4 MS
MDC_IDC_MSMT_LEADCHNL_RV_SENSING_INTR_AMPL: 25 MV
MDC_IDC_PG_IMPLANT_DTM: NORMAL
MDC_IDC_PG_MFG: NORMAL
MDC_IDC_PG_MODEL: NORMAL
MDC_IDC_PG_SERIAL: NORMAL
MDC_IDC_PG_TYPE: NORMAL
MDC_IDC_SESS_CLINIC_NAME: NORMAL
MDC_IDC_SESS_DTM: NORMAL
MDC_IDC_SESS_TYPE: NORMAL
MDC_IDC_SET_BRADY_AT_MODE_SWITCH_MODE: NORMAL
MDC_IDC_SET_BRADY_AT_MODE_SWITCH_RATE: 170 {BEATS}/MIN
MDC_IDC_SET_BRADY_HYSTRATE: NORMAL
MDC_IDC_SET_BRADY_LOWRATE: 60 {BEATS}/MIN
MDC_IDC_SET_BRADY_MAX_SENSOR_RATE: 130 {BEATS}/MIN
MDC_IDC_SET_BRADY_MAX_TRACKING_RATE: 130 {BEATS}/MIN
MDC_IDC_SET_BRADY_MODE: NORMAL
MDC_IDC_SET_BRADY_PAV_DELAY_HIGH: 150 MS
MDC_IDC_SET_BRADY_PAV_DELAY_LOW: 220 MS
MDC_IDC_SET_BRADY_SAV_DELAY_HIGH: 150 MS
MDC_IDC_SET_BRADY_SAV_DELAY_LOW: 220 MS
MDC_IDC_SET_LEADCHNL_RA_PACING_AMPLITUDE: 2 V
MDC_IDC_SET_LEADCHNL_RA_PACING_CAPTURE_MODE: NORMAL
MDC_IDC_SET_LEADCHNL_RA_PACING_POLARITY: NORMAL
MDC_IDC_SET_LEADCHNL_RA_PACING_PULSEWIDTH: 0.4 MS
MDC_IDC_SET_LEADCHNL_RA_SENSING_ADAPTATION_MODE: NORMAL
MDC_IDC_SET_LEADCHNL_RA_SENSING_POLARITY: NORMAL
MDC_IDC_SET_LEADCHNL_RA_SENSING_SENSITIVITY: 0.25 MV
MDC_IDC_SET_LEADCHNL_RV_PACING_AMPLITUDE: 1.6 V
MDC_IDC_SET_LEADCHNL_RV_PACING_CAPTURE_MODE: NORMAL
MDC_IDC_SET_LEADCHNL_RV_PACING_POLARITY: NORMAL
MDC_IDC_SET_LEADCHNL_RV_PACING_PULSEWIDTH: 0.4 MS
MDC_IDC_SET_LEADCHNL_RV_SENSING_ADAPTATION_MODE: NORMAL
MDC_IDC_SET_LEADCHNL_RV_SENSING_POLARITY: NORMAL
MDC_IDC_SET_LEADCHNL_RV_SENSING_SENSITIVITY: 1.5 MV
MDC_IDC_SET_ZONE_DETECTION_INTERVAL: 375 MS
MDC_IDC_SET_ZONE_TYPE: NORMAL
MDC_IDC_SET_ZONE_VENDOR_TYPE: NORMAL
MDC_IDC_STAT_BRADY_RA_PERCENT_PACED: 5 %
MDC_IDC_STAT_BRADY_RV_PERCENT_PACED: 99 %
MDC_IDC_STAT_EPISODE_RECENT_COUNT: 0
MDC_IDC_STAT_EPISODE_RECENT_COUNT_DTM_END: NORMAL
MDC_IDC_STAT_EPISODE_RECENT_COUNT_DTM_START: NORMAL
MDC_IDC_STAT_EPISODE_TOTAL_COUNT: 0
MDC_IDC_STAT_EPISODE_TOTAL_COUNT_DTM_END: NORMAL
MDC_IDC_STAT_EPISODE_TYPE: NORMAL
MDC_IDC_STAT_EPISODE_TYPE: NORMAL
MDC_IDC_STAT_EPISODE_VENDOR_TYPE: NORMAL
MDC_IDC_STAT_EPISODE_VENDOR_TYPE: NORMAL

## 2021-03-07 ENCOUNTER — HEALTH MAINTENANCE LETTER (OUTPATIENT)
Age: 66
End: 2021-03-07

## 2021-03-29 ENCOUNTER — ANCILLARY PROCEDURE (OUTPATIENT)
Dept: CARDIOLOGY | Facility: CLINIC | Age: 66
End: 2021-03-29
Attending: INTERNAL MEDICINE
Payer: COMMERCIAL

## 2021-03-29 DIAGNOSIS — Z95.0 PACEMAKER: ICD-10-CM

## 2021-03-29 DIAGNOSIS — I44.2 COMPLETE ATRIOVENTRICULAR BLOCK (H): Primary | ICD-10-CM

## 2021-03-29 PROCEDURE — 93280 PM DEVICE PROGR EVAL DUAL: CPT | Performed by: INTERNAL MEDICINE

## 2021-04-13 LAB
MDC_IDC_LEAD_IMPLANT_DT: NORMAL
MDC_IDC_LEAD_IMPLANT_DT: NORMAL
MDC_IDC_LEAD_LOCATION: NORMAL
MDC_IDC_LEAD_LOCATION: NORMAL
MDC_IDC_LEAD_LOCATION_DETAIL_1: NORMAL
MDC_IDC_LEAD_LOCATION_DETAIL_1: NORMAL
MDC_IDC_LEAD_MFG: NORMAL
MDC_IDC_LEAD_MFG: NORMAL
MDC_IDC_LEAD_MODEL: NORMAL
MDC_IDC_LEAD_MODEL: NORMAL
MDC_IDC_LEAD_POLARITY_TYPE: NORMAL
MDC_IDC_LEAD_POLARITY_TYPE: NORMAL
MDC_IDC_LEAD_SERIAL: NORMAL
MDC_IDC_LEAD_SERIAL: NORMAL
MDC_IDC_MSMT_BATTERY_STATUS: NORMAL
MDC_IDC_MSMT_LEADCHNL_RA_IMPEDANCE_VALUE: 587 OHM
MDC_IDC_MSMT_LEADCHNL_RA_PACING_THRESHOLD_AMPLITUDE: 0.8 V
MDC_IDC_MSMT_LEADCHNL_RA_PACING_THRESHOLD_PULSEWIDTH: 0.4 MS
MDC_IDC_MSMT_LEADCHNL_RA_SENSING_INTR_AMPL: 5.4 MV
MDC_IDC_MSMT_LEADCHNL_RV_IMPEDANCE_VALUE: 739 OHM
MDC_IDC_MSMT_LEADCHNL_RV_PACING_THRESHOLD_AMPLITUDE: 0.9 V
MDC_IDC_MSMT_LEADCHNL_RV_PACING_THRESHOLD_PULSEWIDTH: 0.4 MS
MDC_IDC_MSMT_LEADCHNL_RV_SENSING_INTR_AMPL: NORMAL
MDC_IDC_PG_IMPLANT_DTM: NORMAL
MDC_IDC_PG_MFG: NORMAL
MDC_IDC_PG_MODEL: NORMAL
MDC_IDC_PG_SERIAL: NORMAL
MDC_IDC_PG_TYPE: NORMAL
MDC_IDC_SESS_CLINIC_NAME: NORMAL
MDC_IDC_SESS_DTM: NORMAL
MDC_IDC_SESS_TYPE: NORMAL
MDC_IDC_SET_BRADY_AT_MODE_SWITCH_MODE: NORMAL
MDC_IDC_SET_BRADY_AT_MODE_SWITCH_RATE: 170 {BEATS}/MIN
MDC_IDC_SET_BRADY_HYSTRATE: NORMAL
MDC_IDC_SET_BRADY_LOWRATE: 60 {BEATS}/MIN
MDC_IDC_SET_BRADY_MAX_SENSOR_RATE: 130 {BEATS}/MIN
MDC_IDC_SET_BRADY_MAX_TRACKING_RATE: 130 {BEATS}/MIN
MDC_IDC_SET_BRADY_MODE: NORMAL
MDC_IDC_SET_BRADY_PAV_DELAY_HIGH: 150 MS
MDC_IDC_SET_BRADY_PAV_DELAY_LOW: 220 MS
MDC_IDC_SET_BRADY_SAV_DELAY_HIGH: 150 MS
MDC_IDC_SET_BRADY_SAV_DELAY_LOW: 220 MS
MDC_IDC_SET_LEADCHNL_RA_PACING_AMPLITUDE: 2 V
MDC_IDC_SET_LEADCHNL_RA_PACING_CAPTURE_MODE: NORMAL
MDC_IDC_SET_LEADCHNL_RA_PACING_POLARITY: NORMAL
MDC_IDC_SET_LEADCHNL_RA_PACING_PULSEWIDTH: 0.4 MS
MDC_IDC_SET_LEADCHNL_RA_SENSING_ADAPTATION_MODE: NORMAL
MDC_IDC_SET_LEADCHNL_RA_SENSING_POLARITY: NORMAL
MDC_IDC_SET_LEADCHNL_RA_SENSING_SENSITIVITY: 0.25 MV
MDC_IDC_SET_LEADCHNL_RV_PACING_AMPLITUDE: 1.4 V
MDC_IDC_SET_LEADCHNL_RV_PACING_CAPTURE_MODE: NORMAL
MDC_IDC_SET_LEADCHNL_RV_PACING_POLARITY: NORMAL
MDC_IDC_SET_LEADCHNL_RV_PACING_PULSEWIDTH: 0.4 MS
MDC_IDC_SET_LEADCHNL_RV_SENSING_ADAPTATION_MODE: NORMAL
MDC_IDC_SET_LEADCHNL_RV_SENSING_POLARITY: NORMAL
MDC_IDC_SET_LEADCHNL_RV_SENSING_SENSITIVITY: 1.5 MV
MDC_IDC_SET_ZONE_DETECTION_INTERVAL: 375 MS
MDC_IDC_SET_ZONE_TYPE: NORMAL
MDC_IDC_SET_ZONE_VENDOR_TYPE: NORMAL
MDC_IDC_STAT_EPISODE_RECENT_COUNT: 0
MDC_IDC_STAT_EPISODE_RECENT_COUNT_DTM_END: NORMAL
MDC_IDC_STAT_EPISODE_RECENT_COUNT_DTM_START: NORMAL
MDC_IDC_STAT_EPISODE_TOTAL_COUNT: 0
MDC_IDC_STAT_EPISODE_TOTAL_COUNT_DTM_END: NORMAL
MDC_IDC_STAT_EPISODE_TYPE: NORMAL
MDC_IDC_STAT_EPISODE_TYPE: NORMAL
MDC_IDC_STAT_EPISODE_VENDOR_TYPE: NORMAL
MDC_IDC_STAT_EPISODE_VENDOR_TYPE: NORMAL

## 2021-06-02 ENCOUNTER — RECORDS - HEALTHEAST (OUTPATIENT)
Dept: ADMINISTRATIVE | Facility: CLINIC | Age: 66
End: 2021-06-02

## 2021-07-13 ENCOUNTER — RECORDS - HEALTHEAST (OUTPATIENT)
Dept: ADMINISTRATIVE | Facility: CLINIC | Age: 66
End: 2021-07-13

## 2021-07-17 ENCOUNTER — ANCILLARY PROCEDURE (OUTPATIENT)
Dept: CARDIOLOGY | Facility: CLINIC | Age: 66
End: 2021-07-17
Attending: INTERNAL MEDICINE
Payer: COMMERCIAL

## 2021-07-17 DIAGNOSIS — I44.2 COMPLETE ATRIOVENTRICULAR BLOCK (H): ICD-10-CM

## 2021-07-17 DIAGNOSIS — Z95.0 PACEMAKER: ICD-10-CM

## 2021-07-17 PROCEDURE — 93296 REM INTERROG EVL PM/IDS: CPT | Performed by: INTERNAL MEDICINE

## 2021-07-17 PROCEDURE — 93294 REM INTERROG EVL PM/LDLS PM: CPT | Performed by: INTERNAL MEDICINE

## 2021-07-21 ENCOUNTER — RECORDS - HEALTHEAST (OUTPATIENT)
Dept: ADMINISTRATIVE | Facility: CLINIC | Age: 66
End: 2021-07-21

## 2021-07-25 LAB
MDC_IDC_EPISODE_DTM: NORMAL
MDC_IDC_EPISODE_ID: NORMAL
MDC_IDC_EPISODE_TYPE: NORMAL
MDC_IDC_LEAD_IMPLANT_DT: NORMAL
MDC_IDC_LEAD_IMPLANT_DT: NORMAL
MDC_IDC_LEAD_LOCATION: NORMAL
MDC_IDC_LEAD_LOCATION: NORMAL
MDC_IDC_LEAD_LOCATION_DETAIL_1: NORMAL
MDC_IDC_LEAD_LOCATION_DETAIL_1: NORMAL
MDC_IDC_LEAD_MFG: NORMAL
MDC_IDC_LEAD_MFG: NORMAL
MDC_IDC_LEAD_MODEL: NORMAL
MDC_IDC_LEAD_MODEL: NORMAL
MDC_IDC_LEAD_POLARITY_TYPE: NORMAL
MDC_IDC_LEAD_POLARITY_TYPE: NORMAL
MDC_IDC_LEAD_SERIAL: NORMAL
MDC_IDC_LEAD_SERIAL: NORMAL
MDC_IDC_MSMT_BATTERY_DTM: NORMAL
MDC_IDC_MSMT_BATTERY_REMAINING_LONGEVITY: 156 MO
MDC_IDC_MSMT_BATTERY_REMAINING_PERCENTAGE: 100 %
MDC_IDC_MSMT_BATTERY_STATUS: NORMAL
MDC_IDC_MSMT_LEADCHNL_RA_IMPEDANCE_VALUE: 603 OHM
MDC_IDC_MSMT_LEADCHNL_RA_PACING_THRESHOLD_AMPLITUDE: 0.6 V
MDC_IDC_MSMT_LEADCHNL_RA_PACING_THRESHOLD_PULSEWIDTH: 0.4 MS
MDC_IDC_MSMT_LEADCHNL_RV_IMPEDANCE_VALUE: 614 OHM
MDC_IDC_MSMT_LEADCHNL_RV_PACING_THRESHOLD_AMPLITUDE: 1.3 V
MDC_IDC_MSMT_LEADCHNL_RV_PACING_THRESHOLD_PULSEWIDTH: 0.4 MS
MDC_IDC_PG_IMPLANT_DTM: NORMAL
MDC_IDC_PG_MFG: NORMAL
MDC_IDC_PG_MODEL: NORMAL
MDC_IDC_PG_SERIAL: NORMAL
MDC_IDC_PG_TYPE: NORMAL
MDC_IDC_SESS_CLINIC_NAME: NORMAL
MDC_IDC_SESS_DTM: NORMAL
MDC_IDC_SESS_TYPE: NORMAL
MDC_IDC_SET_BRADY_AT_MODE_SWITCH_MODE: NORMAL
MDC_IDC_SET_BRADY_AT_MODE_SWITCH_RATE: 170 {BEATS}/MIN
MDC_IDC_SET_BRADY_LOWRATE: 60 {BEATS}/MIN
MDC_IDC_SET_BRADY_MAX_SENSOR_RATE: 130 {BEATS}/MIN
MDC_IDC_SET_BRADY_MAX_TRACKING_RATE: 130 {BEATS}/MIN
MDC_IDC_SET_BRADY_MODE: NORMAL
MDC_IDC_SET_BRADY_PAV_DELAY_HIGH: 150 MS
MDC_IDC_SET_BRADY_PAV_DELAY_LOW: 220 MS
MDC_IDC_SET_BRADY_SAV_DELAY_HIGH: 150 MS
MDC_IDC_SET_BRADY_SAV_DELAY_LOW: 220 MS
MDC_IDC_SET_LEADCHNL_RA_PACING_AMPLITUDE: 2 V
MDC_IDC_SET_LEADCHNL_RA_PACING_CAPTURE_MODE: NORMAL
MDC_IDC_SET_LEADCHNL_RA_PACING_POLARITY: NORMAL
MDC_IDC_SET_LEADCHNL_RA_PACING_PULSEWIDTH: 0.4 MS
MDC_IDC_SET_LEADCHNL_RA_SENSING_ADAPTATION_MODE: NORMAL
MDC_IDC_SET_LEADCHNL_RA_SENSING_POLARITY: NORMAL
MDC_IDC_SET_LEADCHNL_RA_SENSING_SENSITIVITY: 0.25 MV
MDC_IDC_SET_LEADCHNL_RV_PACING_AMPLITUDE: 1.8 V
MDC_IDC_SET_LEADCHNL_RV_PACING_CAPTURE_MODE: NORMAL
MDC_IDC_SET_LEADCHNL_RV_PACING_POLARITY: NORMAL
MDC_IDC_SET_LEADCHNL_RV_PACING_PULSEWIDTH: 0.4 MS
MDC_IDC_SET_LEADCHNL_RV_SENSING_ADAPTATION_MODE: NORMAL
MDC_IDC_SET_LEADCHNL_RV_SENSING_POLARITY: NORMAL
MDC_IDC_SET_LEADCHNL_RV_SENSING_SENSITIVITY: 1.5 MV
MDC_IDC_SET_ZONE_DETECTION_INTERVAL: 375 MS
MDC_IDC_SET_ZONE_TYPE: NORMAL
MDC_IDC_SET_ZONE_VENDOR_TYPE: NORMAL
MDC_IDC_STAT_AT_BURDEN_PERCENT: 0 %
MDC_IDC_STAT_AT_DTM_END: NORMAL
MDC_IDC_STAT_AT_DTM_START: NORMAL
MDC_IDC_STAT_BRADY_DTM_END: NORMAL
MDC_IDC_STAT_BRADY_DTM_START: NORMAL
MDC_IDC_STAT_BRADY_RA_PERCENT_PACED: 3 %
MDC_IDC_STAT_BRADY_RV_PERCENT_PACED: 100 %
MDC_IDC_STAT_EPISODE_RECENT_COUNT: 0
MDC_IDC_STAT_EPISODE_RECENT_COUNT_DTM_END: NORMAL
MDC_IDC_STAT_EPISODE_RECENT_COUNT_DTM_START: NORMAL
MDC_IDC_STAT_EPISODE_TYPE: NORMAL
MDC_IDC_STAT_EPISODE_VENDOR_TYPE: NORMAL

## 2021-08-02 ENCOUNTER — ANCILLARY PROCEDURE (OUTPATIENT)
Dept: CARDIOLOGY | Facility: CLINIC | Age: 66
End: 2021-08-02
Attending: INTERNAL MEDICINE
Payer: COMMERCIAL

## 2021-08-02 ENCOUNTER — OFFICE VISIT (OUTPATIENT)
Dept: CARDIOLOGY | Facility: CLINIC | Age: 66
End: 2021-08-02
Payer: COMMERCIAL

## 2021-08-02 VITALS
OXYGEN SATURATION: 98 % | SYSTOLIC BLOOD PRESSURE: 162 MMHG | WEIGHT: 179 LBS | RESPIRATION RATE: 18 BRPM | BODY MASS INDEX: 32.74 KG/M2 | DIASTOLIC BLOOD PRESSURE: 90 MMHG | HEART RATE: 74 BPM

## 2021-08-02 DIAGNOSIS — Z95.0 CARDIAC PACEMAKER IN SITU: ICD-10-CM

## 2021-08-02 DIAGNOSIS — I10 HYPERTENSION, UNSPECIFIED TYPE: ICD-10-CM

## 2021-08-02 DIAGNOSIS — R00.1 BRADYCARDIA: Primary | ICD-10-CM

## 2021-08-02 LAB
MDC_IDC_LEAD_IMPLANT_DT: NORMAL
MDC_IDC_LEAD_IMPLANT_DT: NORMAL
MDC_IDC_LEAD_LOCATION: NORMAL
MDC_IDC_LEAD_LOCATION: NORMAL
MDC_IDC_LEAD_LOCATION_DETAIL_1: NORMAL
MDC_IDC_LEAD_LOCATION_DETAIL_1: NORMAL
MDC_IDC_LEAD_MFG: NORMAL
MDC_IDC_LEAD_MFG: NORMAL
MDC_IDC_LEAD_MODEL: NORMAL
MDC_IDC_LEAD_MODEL: NORMAL
MDC_IDC_LEAD_POLARITY_TYPE: NORMAL
MDC_IDC_LEAD_POLARITY_TYPE: NORMAL
MDC_IDC_LEAD_SERIAL: NORMAL
MDC_IDC_LEAD_SERIAL: NORMAL
MDC_IDC_MSMT_BATTERY_DTM: NORMAL
MDC_IDC_MSMT_BATTERY_REMAINING_LONGEVITY: 156 MO
MDC_IDC_MSMT_BATTERY_REMAINING_PERCENTAGE: 100 %
MDC_IDC_MSMT_BATTERY_STATUS: NORMAL
MDC_IDC_MSMT_LEADCHNL_RA_IMPEDANCE_VALUE: 603 OHM
MDC_IDC_MSMT_LEADCHNL_RA_PACING_THRESHOLD_AMPLITUDE: 0.7 V
MDC_IDC_MSMT_LEADCHNL_RA_PACING_THRESHOLD_PULSEWIDTH: 0.4 MS
MDC_IDC_MSMT_LEADCHNL_RV_IMPEDANCE_VALUE: 593 OHM
MDC_IDC_MSMT_LEADCHNL_RV_PACING_THRESHOLD_AMPLITUDE: 1 V
MDC_IDC_MSMT_LEADCHNL_RV_PACING_THRESHOLD_AMPLITUDE: 1 V
MDC_IDC_MSMT_LEADCHNL_RV_PACING_THRESHOLD_PULSEWIDTH: 0.4 MS
MDC_IDC_MSMT_LEADCHNL_RV_PACING_THRESHOLD_PULSEWIDTH: 0.4 MS
MDC_IDC_PG_IMPLANT_DTM: NORMAL
MDC_IDC_PG_MFG: NORMAL
MDC_IDC_PG_MODEL: NORMAL
MDC_IDC_PG_SERIAL: NORMAL
MDC_IDC_PG_TYPE: NORMAL
MDC_IDC_SESS_CLINIC_NAME: NORMAL
MDC_IDC_SESS_DTM: NORMAL
MDC_IDC_SESS_TYPE: NORMAL
MDC_IDC_SET_BRADY_AT_MODE_SWITCH_MODE: NORMAL
MDC_IDC_SET_BRADY_AT_MODE_SWITCH_RATE: 170 {BEATS}/MIN
MDC_IDC_SET_BRADY_LOWRATE: 60 {BEATS}/MIN
MDC_IDC_SET_BRADY_MAX_SENSOR_RATE: 130 {BEATS}/MIN
MDC_IDC_SET_BRADY_MAX_TRACKING_RATE: 130 {BEATS}/MIN
MDC_IDC_SET_BRADY_MODE: NORMAL
MDC_IDC_SET_BRADY_PAV_DELAY_HIGH: 150 MS
MDC_IDC_SET_BRADY_PAV_DELAY_LOW: 260 MS
MDC_IDC_SET_BRADY_SAV_DELAY_HIGH: 140 MS
MDC_IDC_SET_BRADY_SAV_DELAY_LOW: 240 MS
MDC_IDC_SET_LEADCHNL_RA_PACING_AMPLITUDE: NORMAL
MDC_IDC_SET_LEADCHNL_RA_PACING_CAPTURE_MODE: NORMAL
MDC_IDC_SET_LEADCHNL_RA_PACING_POLARITY: NORMAL
MDC_IDC_SET_LEADCHNL_RA_PACING_PULSEWIDTH: 0.4 MS
MDC_IDC_SET_LEADCHNL_RA_SENSING_ADAPTATION_MODE: NORMAL
MDC_IDC_SET_LEADCHNL_RA_SENSING_POLARITY: NORMAL
MDC_IDC_SET_LEADCHNL_RA_SENSING_SENSITIVITY: 0.25 MV
MDC_IDC_SET_LEADCHNL_RV_PACING_AMPLITUDE: NORMAL
MDC_IDC_SET_LEADCHNL_RV_PACING_CAPTURE_MODE: NORMAL
MDC_IDC_SET_LEADCHNL_RV_PACING_POLARITY: NORMAL
MDC_IDC_SET_LEADCHNL_RV_PACING_PULSEWIDTH: 0.4 MS
MDC_IDC_SET_LEADCHNL_RV_SENSING_ADAPTATION_MODE: NORMAL
MDC_IDC_SET_LEADCHNL_RV_SENSING_POLARITY: NORMAL
MDC_IDC_SET_LEADCHNL_RV_SENSING_SENSITIVITY: 1.5 MV
MDC_IDC_SET_ZONE_DETECTION_INTERVAL: 375 MS
MDC_IDC_SET_ZONE_TYPE: NORMAL
MDC_IDC_SET_ZONE_VENDOR_TYPE: NORMAL
MDC_IDC_STAT_AT_BURDEN_PERCENT: 0 %
MDC_IDC_STAT_AT_DTM_END: NORMAL
MDC_IDC_STAT_AT_DTM_START: NORMAL
MDC_IDC_STAT_AT_MODE_SW_COUNT: 0
MDC_IDC_STAT_BRADY_DTM_END: NORMAL
MDC_IDC_STAT_BRADY_DTM_START: NORMAL
MDC_IDC_STAT_BRADY_RA_PERCENT_PACED: 3 %
MDC_IDC_STAT_BRADY_RV_PERCENT_PACED: 100 %
MDC_IDC_STAT_EPISODE_RECENT_COUNT: 0
MDC_IDC_STAT_EPISODE_RECENT_COUNT_DTM_END: NORMAL
MDC_IDC_STAT_EPISODE_RECENT_COUNT_DTM_START: NORMAL
MDC_IDC_STAT_EPISODE_TYPE: NORMAL
MDC_IDC_STAT_EPISODE_VENDOR_TYPE: NORMAL

## 2021-08-02 PROCEDURE — 93280 PM DEVICE PROGR EVAL DUAL: CPT | Performed by: INTERNAL MEDICINE

## 2021-08-02 PROCEDURE — 99203 OFFICE O/P NEW LOW 30 MIN: CPT | Performed by: INTERNAL MEDICINE

## 2021-08-02 NOTE — PROGRESS NOTES
In clinic device check with Dr. Lopez to establish care with device clinic here..  Please see link for full device report.  Patient was informed of results and next follow up during today's visit.

## 2021-08-02 NOTE — PROGRESS NOTES
Consultation - [facility]  Tameka Avalos,  1955, MRN [id number]    PCP: Eve Brewer, [phone]    Assessment and Plan: Recent permanent pacemaker placement for second-degree heart block  Recommendations: Continue with current medical therapy and observation.  Hypertension is being directed by primary care physician and currently her measurements at home show fairly good control.  Post pacemaker evaluation    Chief Complaint: Recent permanent pacemaker placement    HPI:  We have been requested by Dr. Segovia to evaluate Tameka Avalos for consultation who is a  66 year old year old female for above chief complaint.  Hx: Patient with a history of hypertension.  Was recently admitted after evaluation in [facility] ER for heart block.  Her presentation at that time was intense headache associated with severe hypertension but also fatigue and weakness.  Patient was admitted to [facility] in 2021.  Patient was initially seen at the [facility] but due to lack of bed availability was transferred to [facility].  Patient had bradycardia with second-degree heart block.  On  underwent placement of a permanent pacemaker.  Patient carries a history of hypertension for which losartan has been successful in controlling.  During admission blood pressure was elevated to remarkable extent (277/157).  Nephrology was engaged because of stage II-III kidney failure and hydralazine and hydrochlorothiazide added to the regimen.      Current Outpatient Medications:      ferrous sulfate 140 (45 Fe) MG TBCR CR tablet, Take 140 mg by mouth 2 times daily , Disp: , Rfl:      hydrALAZINE (APRESOLINE) 50 MG tablet, Take 1 tablet (50 mg) by mouth 2 times daily, Disp: 60 tablet, Rfl: 0     hydrochlorothiazide (MICROZIDE) 12.5 MG capsule, Take 2 capsules (25 mg) by mouth daily, Disp: 60 capsule, Rfl: 0     levothyroxine (SYNTHROID/LEVOTHROID) 50 MCG tablet, Take 50 mcg by mouth daily,  Disp: , Rfl:      losartan (COZAAR) 50 MG tablet, Take 1 tablet (50 mg) by mouth 2 times daily, Disp: 60 tablet, Rfl: 0     neomycin-bacitracin-polymyxin (NEOSPORIN) 5-400-5000 ointment, Apply topically daily as needed , Disp: , Rfl:      omeprazole (PRILOSEC) 40 MG DR capsule, Take 40 mg by mouth daily, Disp: , Rfl:      vitamin D3 (CHOLECALCIFEROL) 50 mcg (2000 units) tablet, Take 1 tablet by mouth daily, Disp: , Rfl:   Medical History  [unfilled]  @Psychiatric@    Surgical History  She  has a past surgical history that includes Electrophysiology Pacemaker (Left, 2/8/2021).    Social History  Reviewed, and she    Smoking status reviewed.  Social history othrwise not contributory to HPI.  Allergies  No Known Allergies    Family History  Reviewed, and family history is not on file.  Extended Emergency Contact Information  Primary Emergency Contact: Quentin Avalos  Home Phone: [phone]  Relation: Spouse  Secondary Emergency Contact: Tavo Avalos  Home Phone: [phone]  Relation: Son  Family history otherwise negative or not conributory to HPI.    Psychosocial Needs  Social History     Social History Narrative     Not on file     Additional psychosocial needs reviewed per nursing assessment.    Prior to Admission Medications  (Not in a hospital admission)      Review of Systems:  A comprehensive review of systems was negative.  Review of systems is negative except for HPI  Physical Exam:  Blood pressure 162/90    Head and neck without focal cranial neurologic defects.  JVD not distended.  Carotid upstroke normal without bruit.  External eye exam normal without icterus.  External ear exam normal.  Neck without cervical lymphadenopathy or thyromegaly.  Cardiac: S1-S2 distinct and regular prolonged evaluation  Lungs: Clear in all fields  Abdomen with normal bowel tones.  Skin without rash, ecchymosis, lesions.  Neuromuscular tone normal.  Peripheral pulse intact and equal.  Joints without swelling or  erythema.    Pertinent Labs  Lab Results: personally reviewed.   Ancillary Procedure on 08/02/2021   Component Date Value     Date Time Interrogation * 08/02/2021 [id number]      Implantable Pulse Genera* 08/02/2021 [employer]      Implantable Pulse Genera* 08/02/2021 L331 ACCOLADE MRI EL      Implantable Pulse Genera* 08/02/2021 [id number]      Type Interrogation Sessi* 08/02/2021 In Clinic      Clinic Name 08/02/2021 [facility]      Implantable Pulse Genera* 08/02/2021 Pacemaker      Implantable Pulse Genera* 08/02/2021 [id number]      Implantable Lead Manufac* 08/02/2021 [employer]      Implantable Lead Model 08/02/2021 7840 Ingevity + MRI      Implantable Lead Serial * 08/02/2021 [id number]      Implantable Lead Implant* 08/02/2021 [id number]      Implantable Lead Polarit* 08/02/2021 Bipolar Lead      Implantable Lead Locatio* 08/02/2021 UNKNOWN      Implantable Lead Location 08/02/2021 Right Atrium      Implantable Lead Manufac* 08/02/2021 [employer]      Implantable Lead Model 08/02/2021 7841 Ingevity + MRI      Implantable Lead Serial * 08/02/2021 [id number]      Implantable Lead Implant* 08/02/2021 [id number]      Implantable Lead Polarit* 08/02/2021 Bipolar Lead      Implantable Lead Locatio* 08/02/2021 UNKNOWN      Implantable Lead Location 08/02/2021 Right Ventricle      Edgar Setting Mode (NBG * 08/02/2021 DDD      Edgar Setting Lower Rate* 08/02/2021 60      Edgar Setting Maximum Tr* 08/02/2021 130      Edgar Setting Maximum Se* 08/02/2021 130      Edgar Setting GERALDINE Delay * 08/02/2021 240      Edgar Setting PAV Delay * 08/02/2021 260      Edgar Setting PAV Delay * 08/02/2021 150      Edgar Setting GERALDINE Delay * 08/02/2021 140      Edgar Setting AT Mode Sw* 08/02/2021 170      Edgar Setting AT Mode Sw* 08/02/2021 VDIR      Lead Channel Setting Sen* 08/02/2021 Bipolar      Lead Channel Setting Sen* 08/02/2021 0.25      Lead Channel Setting Sen* 08/02/2021 Adaptive      Lead Channel  Setting Sen* 08/02/2021 Bipolar      Lead Channel Setting Sen* 08/02/2021 1.5      Lead Channel Setting Sen* 08/02/2021 Adaptive      Lead Channel Setting Pac* 08/02/2021 Bipolar      Lead Channel Setting Pac* 08/02/2021 0.4      Lead Channel Setting Pac* 08/02/2021 2.0A      Lead Channel Setting Pac* 08/02/2021 Adaptive      Lead Channel Setting Pac* 08/02/2021 Bipolar      Lead Channel Setting Pac* 08/02/2021 0.4      Lead Channel Setting Pac* 08/02/2021 1.6A      Lead Channel Setting Pac* 08/02/2021 Adaptive      Zone Setting Type Catego* 08/02/2021 VT      Zone Setting Vendor Type* 08/02/2021 VT      Zone Setting Detection I* 08/02/2021 375      Lead Channel Impedance V* 08/02/2021 603      Lead Channel Pacing Thre* 08/02/2021 0.7      Lead Channel Pacing Thre* 08/02/2021 0.4      Lead Channel Impedance V* 08/02/2021 593      Lead Channel Pacing Thre* 08/02/2021 1.0      Lead Channel Pacing Thre* 08/02/2021 0.4      Lead Channel Pacing Thre* 08/02/2021 1.0      Lead Channel Pacing Thre* 08/02/2021 0.4      Battery Date Time of Lyn* 08/02/2021 20210802103500      Battery Status 08/02/2021 Beginning of Service      Battery Remaining Longev* 08/02/2021 156      Battery Remaining Percen* 08/02/2021 100      Edgar Statistic Date Joss* 08/02/2021 20210329000000      Edgar Statistic Date Joss* 08/02/2021 20210802000000      Edgar Statistic RA Perce* 08/02/2021 3      Edgar Statistic RV Perce* 08/02/2021 100      Atrial Tachy Statistic D* 08/02/2021 20210331000000      Atrial Tachy Statistic D* 08/02/2021 [id number]      Atrial Tachy Statistic A* 08/02/2021 0      Atrial Tachy Statistic N* 08/02/2021 0      Episode Statistic Recent* 08/02/2021 0      Episode Statistic Type C* 08/02/2021 AT/AF      Episode Statistic Vendor* 08/02/2021 AF      Episode Statistic Recent* 08/02/2021 0      Episode Statistic Type C* 08/02/2021 Other      Episode Statistic Recent* 08/02/2021 0      Episode Statistic Type C* 08/02/2021 CANDELARIO       Episode Statistic Vendor* 08/02/2021 SVT      Episode Statistic Recent* 08/02/2021 0      Episode Statistic Type C* 08/02/2021 VT      Episode Statistic Vendor* 08/02/2021 NSVT      Episode Statistic Recent* 08/02/2021 0      Episode Statistic Type C* 08/02/2021 VT      Episode Statistic Vendor* 08/02/2021 VT      Episode Statistic Recent* 08/02/2021 12848587001760      Episode Statistic Recent* 08/02/2021 65891290220470      Episode Statistic Recent* 08/02/2021 72485080724951      Episode Statistic Recent* 08/02/2021 [id number]      Episode Statistic Recent* 08/02/2021 [id number]      Episode Statistic Recent* 08/02/2021 [id number]      Episode Statistic Recent* 08/02/2021 47846603842769      Episode Statistic Recent* 08/02/2021 88416543755065      Episode Statistic Recent* 08/02/2021 74071430890790      Episode Statistic Recent* 08/02/2021 48099817837095    Ancillary Procedure on 07/17/2021   Component Date Value     Date Time Interrogation * 07/19/2021 19129326610054      Implantable Pulse Genera* 07/19/2021 [employer]      Implantable Pulse Genera* 07/19/2021 L331 ACCOLADE MRI EL      Implantable Pulse Genera* 07/19/2021 787368      Type Interrogation Sessi* 07/19/2021 Remote       Clinic Name 07/19/2021 [place]      Implantable Pulse Genera* 07/19/2021 Pacemaker      Implantable Pulse Genera* 07/19/2021 [id number]      Implantable Lead Manufac* 07/19/2021 [employer]      Implantable Lead Model 07/19/2021 7840 Ingevity + MRI      Implantable Lead Serial * 07/19/2021 [id number]      Implantable Lead Implant* 07/19/2021 [id number]      Implantable Lead Polarit* 07/19/2021 Bipolar Lead      Implantable Lead Locatio* 07/19/2021 UNKNOWN      Implantable Lead Location 07/19/2021 Right Atrium      Implantable Lead Manufac* 07/19/2021 [employer]      Implantable Lead Model 07/19/2021 7841 Ingevity + MRI      Implantable Lead Serial * 07/19/2021 [id number]      Implantable Lead Implant*  07/19/2021 [id number]      Implantable Lead Polarit* 07/19/2021 Bipolar Lead      Implantable Lead Locatio* 07/19/2021 UNKNOWN      Implantable Lead Location 07/19/2021 Right Ventricle      Edgar Setting Mode (NBG * 07/19/2021 DDD      Edgar Setting Lower Rate* 07/19/2021 60      Edgar Setting Maximum Tr* 07/19/2021 130      Edgar Setting Maximum Se* 07/19/2021 130      Edgar Setting GERALDINE Delay * 07/19/2021 220      Edgar Setting PAV Delay * 07/19/2021 220      Edgar Setting PAV Delay * 07/19/2021 150      Edgar Setting GERALDINE Delay * 07/19/2021 150      Edgar Setting AT Mode Sw* 07/19/2021 170      Edgar Setting AT Mode Sw* 07/19/2021 VDIR      Lead Channel Setting Sen* 07/19/2021 Bipolar      Lead Channel Setting Sen* 07/19/2021 0.25      Lead Channel Setting Sen* 07/19/2021 Adaptive      Lead Channel Setting Sen* 07/19/2021 Bipolar      Lead Channel Setting Sen* 07/19/2021 1.5      Lead Channel Setting Sen* 07/19/2021 Adaptive      Lead Channel Setting Pac* 07/19/2021 Bipolar      Lead Channel Setting Pac* 07/19/2021 0.4      Lead Channel Setting Pac* 07/19/2021 2.0      Lead Channel Setting Pac* 07/19/2021 Adaptive      Lead Channel Setting Pac* 07/19/2021 Bipolar      Lead Channel Setting Pac* 07/19/2021 0.4      Lead Channel Setting Pac* 07/19/2021 1.8      Lead Channel Setting Pac* 07/19/2021 Adaptive      Zone Setting Type Catego* 07/19/2021 VT      Zone Setting Vendor Type* 07/19/2021 VT      Zone Setting Detection I* 07/19/2021 375      Lead Channel Impedance V* 07/19/2021 603      Lead Channel Pacing Thre* 07/19/2021 0.6      Lead Channel Pacing Thre* 07/19/2021 0.4      Lead Channel Impedance V* 07/19/2021 614      Lead Channel Pacing Thre* 07/19/2021 1.3      Lead Channel Pacing Thre* 07/19/2021 0.4      Battery Date Time of Lyn* 07/19/2021 20210717152900      Battery Status 07/19/2021 Beginning of Service      Battery Remaining Longev* 07/19/2021 156      Battery Remaining Percen* 07/19/2021 100      Edgar  Statistic Date Joss* 07/19/2021 20210329000000      Edgar Statistic Date Joss* 07/19/2021 31588541157536      Edgar Statistic RA Perce* 07/19/2021 3      Edgar Statistic RV Perce* 07/19/2021 100      Atrial Tachy Statistic D* 07/19/2021 [id number]      Atrial Tachy Statistic D* 07/19/2021 70384966769709      Atrial Tachy Statistic A* 07/19/2021 0      Episode Statistic Recent* 07/19/2021 0      Episode Statistic Type C* 07/19/2021 AT/AF      Episode Statistic Vendor* 07/19/2021 AF      Episode Statistic Recent* 07/19/2021 0      Episode Statistic Type C* 07/19/2021 Other      Episode Statistic Recent* 07/19/2021 0      Episode Statistic Type C* 07/19/2021 SVT      Episode Statistic Vendor* 07/19/2021 SVT      Episode Statistic Recent* 07/19/2021 0      Episode Statistic Type C* 07/19/2021 VT      Episode Statistic Vendor* 07/19/2021 NSVT      Episode Statistic Recent* 07/19/2021 0      Episode Statistic Type C* 07/19/2021 VT      Episode Statistic Vendor* 07/19/2021 VT      Episode Statistic Recent* 07/19/2021 [id number]      Episode Statistic Recent* 07/19/2021 84452042124947      Episode Statistic Recent* 07/19/2021 96972080648896      Episode Statistic Recent* 07/19/2021 17181040528058      Episode Statistic Recent* 07/19/2021 [id number]      Episode Statistic Recent* 07/19/2021 02709753824823      Episode Statistic Recent* 07/19/2021 35038391392535      Episode Statistic Recent* 07/19/2021 19172322736591      Episode Statistic Recent* 07/19/2021 66434939818045      Episode Statistic Recent* 07/19/2021 60018801849895      Episode Identifier 07/19/2021 [email or web address]      Episode Type Category 07/19/2021 Periodic EGM      Episode Date Time 07/19/2021 [id number]      Episode Identifier 07/19/2021 [email or web address]      Episode Type Category 07/19/2021 Other      Episode Date Time 07/19/2021 [id number]      Episode Identifier 07/19/2021 [email or web address]      Episode Type Category 07/19/2021 Other      Episode Date Time  07/19/2021 [id number]        Pertinent Cardiology  EKG Results: See Report       Current Outpatient Medications:      ferrous sulfate 140 (45 Fe) MG TBCR CR tablet, Take 140 mg by mouth 2 times daily , Disp: , Rfl:      hydrALAZINE (APRESOLINE) 50 MG tablet, Take 1 tablet (50 mg) by mouth 2 times daily, Disp: 60 tablet, Rfl: 0     hydrochlorothiazide (MICROZIDE) 12.5 MG capsule, Take 2 capsules (25 mg) by mouth daily, Disp: 60 capsule, Rfl: 0     levothyroxine (SYNTHROID/LEVOTHROID) 50 MCG tablet, Take 50 mcg by mouth daily, Disp: , Rfl:      losartan (COZAAR) 50 MG tablet, Take 1 tablet (50 mg) by mouth 2 times daily, Disp: 60 tablet, Rfl: 0     neomycin-bacitracin-polymyxin (NEOSPORIN) 5-400-5000 ointment, Apply topically daily as needed , Disp: , Rfl:      omeprazole (PRILOSEC) 40 MG DR capsule, Take 40 mg by mouth daily, Disp: , Rfl:      vitamin D3 (CHOLECALCIFEROL) 50 mcg (2000 units) tablet, Take 1 tablet by mouth daily, Disp: , Rfl:

## 2021-08-02 NOTE — LETTER
2021    Eve Brewer MD  [facility] [address]   [facility] [address]    RE: Tameka Avalos       Dear Colleague,    I had the pleasure of seeing Tameka Avalos in the [facility].    Consultation - [facility]  Tameka Avalos,  1955, MRN [id number]    PCP: Eve Brewer, [phone]    Assessment and Plan: Recent permanent pacemaker placement for second-degree heart block  Recommendations: Continue with current medical therapy and observation.  Hypertension is being directed by primary care physician and currently her measurements at home show fairly good control.  Post pacemaker evaluation    Chief Complaint: Recent permanent pacemaker placement    HPI:  We have been requested by Dr. Segovia to evaluate Tameka Avalos for consultation who is a  66 year old year old female for above chief complaint.  Hx: Patient with a history of hypertension.  Was recently admitted after evaluation in [facility] ER for heart block.  Her presentation at that time was intense headache associated with severe hypertension but also fatigue and weakness.  Patient was admitted to [facility] in 2021.  Patient was initially seen at the [facility] but due to lack of bed availability was transferred to [facility].  Patient had bradycardia with second-degree heart block.  On  underwent placement of a permanent pacemaker.  Patient carries a history of hypertension for which losartan has been successful in controlling.  During admission blood pressure was elevated to remarkable extent (277/157).  Nephrology was engaged because of stage II-III kidney failure and hydralazine and hydrochlorothiazide added to the regimen.      Current Outpatient Medications:      ferrous sulfate 140 (45 Fe) MG TBCR CR tablet, Take 140 mg by mouth 2 times daily , Disp: , Rfl:      hydrALAZINE (APRESOLINE) 50 MG tablet,  Take 1 tablet (50 mg) by mouth 2 times daily, Disp: 60 tablet, Rfl: 0     hydrochlorothiazide (MICROZIDE) 12.5 MG capsule, Take 2 capsules (25 mg) by mouth daily, Disp: 60 capsule, Rfl: 0     levothyroxine (SYNTHROID/LEVOTHROID) 50 MCG tablet, Take 50 mcg by mouth daily, Disp: , Rfl:      losartan (COZAAR) 50 MG tablet, Take 1 tablet (50 mg) by mouth 2 times daily, Disp: 60 tablet, Rfl: 0     neomycin-bacitracin-polymyxin (NEOSPORIN) 5-400-5000 ointment, Apply topically daily as needed , Disp: , Rfl:      omeprazole (PRILOSEC) 40 MG DR capsule, Take 40 mg by mouth daily, Disp: , Rfl:      vitamin D3 (CHOLECALCIFEROL) 50 mcg (2000 units) tablet, Take 1 tablet by mouth daily, Disp: , Rfl:   Medical History  [unfilled]  @[facility]@    Surgical History  She  has a past surgical history that includes Electrophysiology Pacemaker (Left, 2/8/2021).    Social History  Reviewed, and she    Smoking status reviewed.  Social history othrwise not contributory to HPI.  Allergies  No Known Allergies    Family History  Reviewed, and family history is not on file.  Extended Emergency Contact Information  Primary Emergency Contact: Quentin Avalos  Home Phone: [phone]  Relation: Spouse  Secondary Emergency Contact: Tavo Avalos  Home Phone: [phone]  Relation: Son  Family history otherwise negative or not conributory to HPI.    Psychosocial Needs  Social History     Social History Narrative     Not on file     Additional psychosocial needs reviewed per nursing assessment.    Prior to Admission Medications  (Not in a hospital admission)      Review of Systems:  A comprehensive review of systems was negative.  Review of systems is negative except for HPI  Physical Exam:  Blood pressure 162/90    Head and neck without focal cranial neurologic defects.  JVD not distended.  Carotid upstroke normal without bruit.  External eye exam normal without icterus.  External ear exam normal.  Neck without cervical lymphadenopathy or  thyromegaly.  Cardiac: S1-S2 distinct and regular prolonged evaluation  Lungs: Clear in all fields  Abdomen with normal bowel tones.  Skin without rash, ecchymosis, lesions.  Neuromuscular tone normal.  Peripheral pulse intact and equal.  Joints without swelling or erythema.    Pertinent Labs  Lab Results: personally reviewed.   Ancillary Procedure on 08/02/2021   Component Date Value     Date Time Interrogation * 08/02/2021 [id number]      Implantable Pulse Genera* 08/02/2021 [employer]      Implantable Pulse Genera* 08/02/2021 L331 ACCOLADE MRI EL      Implantable Pulse Genera* 08/02/2021 [id number]      Type Interrogation Sessi* 08/02/2021 In Clinic      Clinic Name 08/02/2021 [facility]      Implantable Pulse Genera* 08/02/2021 Pacemaker      Implantable Pulse Genera* 08/02/2021 [id number]      Implantable Lead Manufac* 08/02/2021 [employer]      Implantable Lead Model 08/02/2021 7840 Ingevity + MRI      Implantable Lead Serial * 08/02/2021 [id number]      Implantable Lead Implant* 08/02/2021 [id number]      Implantable Lead Polarit* 08/02/2021 Bipolar Lead      Implantable Lead Locatio* 08/02/2021 UNKNOWN      Implantable Lead Location 08/02/2021 Right Atrium      Implantable Lead Manufac* 08/02/2021 [employer]      Implantable Lead Model 08/02/2021 7841 Ingevity + MRI      Implantable Lead Serial * 08/02/2021 [id number]      Implantable Lead Implant* 08/02/2021 [id number]      Implantable Lead Polarit* 08/02/2021 Bipolar Lead      Implantable Lead Locatio* 08/02/2021 UNKNOWN      Implantable Lead Location 08/02/2021 Right Ventricle      Edgar Setting Mode (NBG * 08/02/2021 DDD      Edgar Setting Lower Rate* 08/02/2021 60      Edgar Setting Maximum Tr* 08/02/2021 130      Edgar Setting Maximum Se* 08/02/2021 130      Edgar Setting GERALDINE Delay * 08/02/2021 240      Edgar Setting PAV Delay * 08/02/2021 260      Edgar Setting PAV Delay * 08/02/2021 150      Edgar Setting GERALDINE Delay *  08/02/2021 140      Edgar Setting AT Mode Sw* 08/02/2021 170      Edgar Setting AT Mode Sw* 08/02/2021 VDIR      Lead Channel Setting Sen* 08/02/2021 Bipolar      Lead Channel Setting Sen* 08/02/2021 0.25      Lead Channel Setting Sen* 08/02/2021 Adaptive      Lead Channel Setting Sen* 08/02/2021 Bipolar      Lead Channel Setting Sen* 08/02/2021 1.5      Lead Channel Setting Sen* 08/02/2021 Adaptive      Lead Channel Setting Pac* 08/02/2021 Bipolar      Lead Channel Setting Pac* 08/02/2021 0.4      Lead Channel Setting Pac* 08/02/2021 2.0A      Lead Channel Setting Pac* 08/02/2021 Adaptive      Lead Channel Setting Pac* 08/02/2021 Bipolar      Lead Channel Setting Pac* 08/02/2021 0.4      Lead Channel Setting Pac* 08/02/2021 1.6A      Lead Channel Setting Pac* 08/02/2021 Adaptive      Zone Setting Type Catego* 08/02/2021 VT      Zone Setting Vendor Type* 08/02/2021 VT      Zone Setting Detection I* 08/02/2021 375      Lead Channel Impedance V* 08/02/2021 603      Lead Channel Pacing Thre* 08/02/2021 0.7      Lead Channel Pacing Thre* 08/02/2021 0.4      Lead Channel Impedance V* 08/02/2021 593      Lead Channel Pacing Thre* 08/02/2021 1.0      Lead Channel Pacing Thre* 08/02/2021 0.4      Lead Channel Pacing Thre* 08/02/2021 1.0      Lead Channel Pacing Thre* 08/02/2021 0.4      Battery Date Time of Lyn* 08/02/2021 20210802103500      Battery Status 08/02/2021 Beginning of Service      Battery Remaining Longev* 08/02/2021 156      Battery Remaining Percen* 08/02/2021 100      Edgar Statistic Date Joss* 08/02/2021 20210329000000      Edgar Statistic Date Joss* 08/02/2021 20210802000000      Edgar Statistic RA Perce* 08/02/2021 3      Edgar Statistic RV Perce* 08/02/2021 100      Atrial Tachy Statistic D* 08/02/2021 20210331000000      Atrial Tachy Statistic D* 08/02/2021 20210802000000      Atrial Tachy Statistic A* 08/02/2021 0      Atrial Tachy Statistic N* 08/02/2021 0      Episode Statistic Recent* 08/02/2021 0       Episode Statistic Type C* 08/02/2021 AT/AF      Episode Statistic Vendor* 08/02/2021 AF      Episode Statistic Recent* 08/02/2021 0      Episode Statistic Type C* 08/02/2021 Other      Episode Statistic Recent* 08/02/2021 0      Episode Statistic Type C* 08/02/2021 SVT      Episode Statistic Vendor* 08/02/2021 SVT      Episode Statistic Recent* 08/02/2021 0      Episode Statistic Type C* 08/02/2021 VT      Episode Statistic Vendor* 08/02/2021 NSVT      Episode Statistic Recent* 08/02/2021 0      Episode Statistic Type C* 08/02/2021 VT      Episode Statistic Vendor* 08/02/2021 VT      Episode Statistic Recent* 08/02/2021 [id number]      Episode Statistic Recent* 08/02/2021 93833609711693      Episode Statistic Recent* 08/02/2021 73872239923752      Episode Statistic Recent* 08/02/2021 68628325352666      Episode Statistic Recent* 08/02/2021 96925298938004      Episode Statistic Recent* 08/02/2021 59601021685235      Episode Statistic Recent* 08/02/2021 29247170255678      Episode Statistic Recent* 08/02/2021 23318556063387      Episode Statistic Recent* 08/02/2021 95886829111958      Episode Statistic Recent* 08/02/2021 47622469794611    Ancillary Procedure on 07/17/2021   Component Date Value     Date Time Interrogation * 07/19/2021 59040055832983      Implantable Pulse Genera* 07/19/2021 [employer]      Implantable Pulse Genera* 07/19/2021 L331 ACCOLADE MRI EL      Implantable Pulse Genera* 07/19/2021 828294      Type Interrogation Sessi* 07/19/2021 Remote       Clinic Name 07/19/2021 [facility]      Implantable Pulse Genera* 07/19/2021 Pacemaker      Implantable Pulse Genera* 07/19/2021 [id number]      Implantable Lead Manufac* 07/19/2021 [employer]      Implantable Lead Model 07/19/2021 7840 Ingevity + MRI      Implantable Lead Serial * 07/19/2021 [id number]      Implantable Lead Implant* 07/19/2021 [id number]      Implantable Lead Polarit* 07/19/2021 Bipolar Lead      Implantable Lead Locatio*  07/19/2021 UNKNOWN      Implantable Lead Location 07/19/2021 Right Atrium      Implantable Lead Manufac* 07/19/2021 [employer]      Implantable Lead Model 07/19/2021 7841 Ingevity + MRI      Implantable Lead Serial * 07/19/2021 [id number]      Implantable Lead Implant* 07/19/2021 [id number]      Implantable Lead Polarit* 07/19/2021 Bipolar Lead      Implantable Lead Locatio* 07/19/2021 UNKNOWN      Implantable Lead Location 07/19/2021 Right Ventricle      Edgar Setting Mode (NBG * 07/19/2021 DDD      Edgar Setting Lower Rate* 07/19/2021 60      Edgar Setting Maximum Tr* 07/19/2021 130      Edgar Setting Maximum Se* 07/19/2021 130      Edgar Setting GERALDINE Delay * 07/19/2021 220      Edgar Setting PAV Delay * 07/19/2021 220      Edgar Setting PAV Delay * 07/19/2021 150      Edgar Setting GERALDINE Delay * 07/19/2021 150      Edgar Setting AT Mode Sw* 07/19/2021 170      Edgar Setting AT Mode Sw* 07/19/2021 VDIR      Lead Channel Setting Sen* 07/19/2021 Bipolar      Lead Channel Setting Sen* 07/19/2021 0.25      Lead Channel Setting Sen* 07/19/2021 Adaptive      Lead Channel Setting Sen* 07/19/2021 Bipolar      Lead Channel Setting Sen* 07/19/2021 1.5      Lead Channel Setting Sen* 07/19/2021 Adaptive      Lead Channel Setting Pac* 07/19/2021 Bipolar      Lead Channel Setting Pac* 07/19/2021 0.4      Lead Channel Setting Pac* 07/19/2021 2.0      Lead Channel Setting Pac* 07/19/2021 Adaptive      Lead Channel Setting Pac* 07/19/2021 Bipolar      Lead Channel Setting Pac* 07/19/2021 0.4      Lead Channel Setting Pac* 07/19/2021 1.8      Lead Channel Setting Pac* 07/19/2021 Adaptive      Zone Setting Type Catego* 07/19/2021 VT      Zone Setting Vendor Type* 07/19/2021 VT      Zone Setting Detection I* 07/19/2021 375      Lead Channel Impedance V* 07/19/2021 603      Lead Channel Pacing Thre* 07/19/2021 0.6      Lead Channel Pacing Thre* 07/19/2021 0.4      Lead Channel Impedance V* 07/19/2021 614      Lead Channel Pacing Thre*  07/19/2021 1.3      Lead Channel Pacing Thre* 07/19/2021 0.4      Battery Date Time of Lyn* 07/19/2021 [id number]      Battery Status 07/19/2021 Beginning of Service      Battery Remaining Longev* 07/19/2021 156      Battery Remaining Percen* 07/19/2021 100      Edgar Statistic Date Joss* 07/19/2021 92270875612070      Edgar Statistic Date Joss* 07/19/2021 22846636714967      Edgar Statistic RA Perce* 07/19/2021 3      Edgar Statistic RV Perce* 07/19/2021 100      Atrial Tachy Statistic D* 07/19/2021 54166309099404      Atrial Tachy Statistic D* 07/19/2021 84423075967179      Atrial Tachy Statistic A* 07/19/2021 0      Episode Statistic Recent* 07/19/2021 0      Episode Statistic Type C* 07/19/2021 AT/AF      Episode Statistic Vendor* 07/19/2021 AF      Episode Statistic Recent* 07/19/2021 0      Episode Statistic Type C* 07/19/2021 Other      Episode Statistic Recent* 07/19/2021 0      Episode Statistic Type C* 07/19/2021 SVT      Episode Statistic Vendor* 07/19/2021 SVT      Episode Statistic Recent* 07/19/2021 0      Episode Statistic Type C* 07/19/2021 VT      Episode Statistic Vendor* 07/19/2021 NSVT      Episode Statistic Recent* 07/19/2021 0      Episode Statistic Type C* 07/19/2021 VT      Episode Statistic Vendor* 07/19/2021 VT      Episode Statistic Recent* 07/19/2021 [id number]      Episode Statistic Recent* 07/19/2021 [id number]      Episode Statistic Recent* 07/19/2021 51818297150795      Episode Statistic Recent* 07/19/2021 17509696791618      Episode Statistic Recent* 07/19/2021 78071088339609      Episode Statistic Recent* 07/19/2021 [id number]      Episode Statistic Recent* 07/19/2021 02111692942038      Episode Statistic Recent* 07/19/2021 54718211493853      Episode Statistic Recent* 07/19/2021 76692655289368      Episode Statistic Recent* 07/19/2021 13819924545175      Episode Identifier 07/19/2021 [email or web address]      Episode Type Category 07/19/2021 Periodic EGM      Episode Date Time  07/19/2021 [id number]      Episode Identifier 07/19/2021 [email or web address]      Episode Type Category 07/19/2021 Other      Episode Date Time 07/19/2021 [id number]      Episode Identifier 07/19/2021 [email or web address]      Episode Type Category 07/19/2021 Other      Episode Date Time 07/19/2021 [id number]        Pertinent Cardiology  EKG Results: See Report       Current Outpatient Medications:      ferrous sulfate 140 (45 Fe) MG TBCR CR tablet, Take 140 mg by mouth 2 times daily , Disp: , Rfl:      hydrALAZINE (APRESOLINE) 50 MG tablet, Take 1 tablet (50 mg) by mouth 2 times daily, Disp: 60 tablet, Rfl: 0     hydrochlorothiazide (MICROZIDE) 12.5 MG capsule, Take 2 capsules (25 mg) by mouth daily, Disp: 60 capsule, Rfl: 0     levothyroxine (SYNTHROID/LEVOTHROID) 50 MCG tablet, Take 50 mcg by mouth daily, Disp: , Rfl:      losartan (COZAAR) 50 MG tablet, Take 1 tablet (50 mg) by mouth 2 times daily, Disp: 60 tablet, Rfl: 0     neomycin-bacitracin-polymyxin (NEOSPORIN) 5-400-5000 ointment, Apply topically daily as needed , Disp: , Rfl:      omeprazole (PRILOSEC) 40 MG DR capsule, Take 40 mg by mouth daily, Disp: , Rfl:      vitamin D3 (CHOLECALCIFEROL) 50 mcg (2000 units) tablet, Take 1 tablet by mouth daily, Disp: , Rfl:                     Thank you for allowing me to participate in the care of your patient.      Sincerely,     Ricardo Lopez MD     [facility]  cc:   No referring provider defined for this encounter.

## 2021-10-11 ENCOUNTER — HEALTH MAINTENANCE LETTER (OUTPATIENT)
Age: 66
End: 2021-10-11

## 2021-11-01 ENCOUNTER — LAB REQUISITION (OUTPATIENT)
Dept: LAB | Facility: CLINIC | Age: 66
End: 2021-11-01

## 2021-11-01 DIAGNOSIS — E03.9 HYPOTHYROIDISM, UNSPECIFIED: ICD-10-CM

## 2021-11-01 DIAGNOSIS — I10 ESSENTIAL (PRIMARY) HYPERTENSION: ICD-10-CM

## 2021-11-01 LAB
ALBUMIN SERPL-MCNC: 3.6 G/DL (ref 3.5–5)
ALP SERPL-CCNC: 83 U/L (ref 45–120)
ALT SERPL W P-5'-P-CCNC: 10 U/L (ref 0–45)
ANION GAP SERPL CALCULATED.3IONS-SCNC: 12 MMOL/L (ref 5–18)
AST SERPL W P-5'-P-CCNC: 16 U/L (ref 0–40)
BILIRUB SERPL-MCNC: 0.5 MG/DL (ref 0–1)
BUN SERPL-MCNC: 19 MG/DL (ref 8–22)
CALCIUM SERPL-MCNC: 9.7 MG/DL (ref 8.5–10.5)
CHLORIDE BLD-SCNC: 107 MMOL/L (ref 98–107)
CO2 SERPL-SCNC: 22 MMOL/L (ref 22–31)
CREAT SERPL-MCNC: 1.2 MG/DL (ref 0.6–1.1)
ERYTHROCYTE [DISTWIDTH] IN BLOOD BY AUTOMATED COUNT: 13.9 % (ref 10–15)
GFR SERPL CREATININE-BSD FRML MDRD: 47 ML/MIN/1.73M2
GLUCOSE BLD-MCNC: 86 MG/DL (ref 70–125)
HCT VFR BLD AUTO: 43.4 % (ref 35–47)
HGB BLD-MCNC: 14.1 G/DL (ref 11.7–15.7)
MCH RBC QN AUTO: 30.2 PG (ref 26.5–33)
MCHC RBC AUTO-ENTMCNC: 32.5 G/DL (ref 31.5–36.5)
MCV RBC AUTO: 93 FL (ref 78–100)
PLATELET # BLD AUTO: 325 10E3/UL (ref 150–450)
POTASSIUM BLD-SCNC: 4.2 MMOL/L (ref 3.5–5)
PROT SERPL-MCNC: 6.7 G/DL (ref 6–8)
RBC # BLD AUTO: 4.67 10E6/UL (ref 3.8–5.2)
SODIUM SERPL-SCNC: 141 MMOL/L (ref 136–145)
T4 FREE SERPL-MCNC: 0.84 NG/DL (ref 0.7–1.8)
TSH SERPL DL<=0.005 MIU/L-ACNC: 5.76 UIU/ML (ref 0.3–5)
WBC # BLD AUTO: 7.4 10E3/UL (ref 4–11)

## 2021-11-01 PROCEDURE — 85014 HEMATOCRIT: CPT | Performed by: FAMILY MEDICINE

## 2021-11-01 PROCEDURE — 82306 VITAMIN D 25 HYDROXY: CPT | Performed by: FAMILY MEDICINE

## 2021-11-01 PROCEDURE — 84443 ASSAY THYROID STIM HORMONE: CPT | Performed by: FAMILY MEDICINE

## 2021-11-01 PROCEDURE — 84439 ASSAY OF FREE THYROXINE: CPT | Performed by: FAMILY MEDICINE

## 2021-11-01 PROCEDURE — 80053 COMPREHEN METABOLIC PANEL: CPT | Performed by: FAMILY MEDICINE

## 2021-11-02 LAB — DEPRECATED CALCIDIOL+CALCIFEROL SERPL-MC: 37 UG/L (ref 30–80)

## 2021-11-12 ENCOUNTER — ANCILLARY PROCEDURE (OUTPATIENT)
Dept: CARDIOLOGY | Facility: CLINIC | Age: 66
End: 2021-11-12
Attending: INTERNAL MEDICINE
Payer: COMMERCIAL

## 2021-11-12 DIAGNOSIS — I44.2 THIRD DEGREE AV BLOCK (H): ICD-10-CM

## 2021-11-12 DIAGNOSIS — Z95.0 PACEMAKER: ICD-10-CM

## 2021-11-12 PROCEDURE — 93294 REM INTERROG EVL PM/LDLS PM: CPT | Performed by: INTERNAL MEDICINE

## 2021-11-12 PROCEDURE — 93296 REM INTERROG EVL PM/IDS: CPT | Performed by: INTERNAL MEDICINE

## 2021-11-15 LAB
MDC_IDC_EPISODE_DTM: NORMAL
MDC_IDC_EPISODE_DTM: NORMAL
MDC_IDC_EPISODE_ID: NORMAL
MDC_IDC_EPISODE_ID: NORMAL
MDC_IDC_EPISODE_TYPE: NORMAL
MDC_IDC_EPISODE_TYPE: NORMAL
MDC_IDC_LEAD_IMPLANT_DT: NORMAL
MDC_IDC_LEAD_IMPLANT_DT: NORMAL
MDC_IDC_LEAD_LOCATION: NORMAL
MDC_IDC_LEAD_LOCATION: NORMAL
MDC_IDC_LEAD_LOCATION_DETAIL_1: NORMAL
MDC_IDC_LEAD_LOCATION_DETAIL_1: NORMAL
MDC_IDC_LEAD_MFG: NORMAL
MDC_IDC_LEAD_MFG: NORMAL
MDC_IDC_LEAD_MODEL: NORMAL
MDC_IDC_LEAD_MODEL: NORMAL
MDC_IDC_LEAD_POLARITY_TYPE: NORMAL
MDC_IDC_LEAD_POLARITY_TYPE: NORMAL
MDC_IDC_LEAD_SERIAL: NORMAL
MDC_IDC_LEAD_SERIAL: NORMAL
MDC_IDC_MSMT_BATTERY_DTM: NORMAL
MDC_IDC_MSMT_BATTERY_REMAINING_LONGEVITY: 150 MO
MDC_IDC_MSMT_BATTERY_REMAINING_PERCENTAGE: 100 %
MDC_IDC_MSMT_BATTERY_STATUS: NORMAL
MDC_IDC_MSMT_LEADCHNL_RA_IMPEDANCE_VALUE: 586 OHM
MDC_IDC_MSMT_LEADCHNL_RA_PACING_THRESHOLD_AMPLITUDE: 0.6 V
MDC_IDC_MSMT_LEADCHNL_RA_PACING_THRESHOLD_PULSEWIDTH: 0.4 MS
MDC_IDC_MSMT_LEADCHNL_RV_IMPEDANCE_VALUE: 555 OHM
MDC_IDC_MSMT_LEADCHNL_RV_PACING_THRESHOLD_AMPLITUDE: 1.4 V
MDC_IDC_MSMT_LEADCHNL_RV_PACING_THRESHOLD_PULSEWIDTH: 0.4 MS
MDC_IDC_PG_IMPLANT_DTM: NORMAL
MDC_IDC_PG_MFG: NORMAL
MDC_IDC_PG_MODEL: NORMAL
MDC_IDC_PG_SERIAL: NORMAL
MDC_IDC_PG_TYPE: NORMAL
MDC_IDC_SESS_CLINIC_NAME: NORMAL
MDC_IDC_SESS_DTM: NORMAL
MDC_IDC_SESS_TYPE: NORMAL
MDC_IDC_SET_BRADY_AT_MODE_SWITCH_MODE: NORMAL
MDC_IDC_SET_BRADY_AT_MODE_SWITCH_RATE: 170 {BEATS}/MIN
MDC_IDC_SET_BRADY_LOWRATE: 60 {BEATS}/MIN
MDC_IDC_SET_BRADY_MAX_SENSOR_RATE: 130 {BEATS}/MIN
MDC_IDC_SET_BRADY_MAX_TRACKING_RATE: 130 {BEATS}/MIN
MDC_IDC_SET_BRADY_MODE: NORMAL
MDC_IDC_SET_BRADY_PAV_DELAY_HIGH: 150 MS
MDC_IDC_SET_BRADY_PAV_DELAY_LOW: 260 MS
MDC_IDC_SET_BRADY_SAV_DELAY_HIGH: 140 MS
MDC_IDC_SET_BRADY_SAV_DELAY_LOW: 240 MS
MDC_IDC_SET_LEADCHNL_RA_PACING_AMPLITUDE: 2 V
MDC_IDC_SET_LEADCHNL_RA_PACING_CAPTURE_MODE: NORMAL
MDC_IDC_SET_LEADCHNL_RA_PACING_POLARITY: NORMAL
MDC_IDC_SET_LEADCHNL_RA_PACING_PULSEWIDTH: 0.4 MS
MDC_IDC_SET_LEADCHNL_RA_SENSING_ADAPTATION_MODE: NORMAL
MDC_IDC_SET_LEADCHNL_RA_SENSING_POLARITY: NORMAL
MDC_IDC_SET_LEADCHNL_RA_SENSING_SENSITIVITY: 0.25 MV
MDC_IDC_SET_LEADCHNL_RV_PACING_AMPLITUDE: 1.9 V
MDC_IDC_SET_LEADCHNL_RV_PACING_CAPTURE_MODE: NORMAL
MDC_IDC_SET_LEADCHNL_RV_PACING_POLARITY: NORMAL
MDC_IDC_SET_LEADCHNL_RV_PACING_PULSEWIDTH: 0.4 MS
MDC_IDC_SET_LEADCHNL_RV_SENSING_ADAPTATION_MODE: NORMAL
MDC_IDC_SET_LEADCHNL_RV_SENSING_POLARITY: NORMAL
MDC_IDC_SET_LEADCHNL_RV_SENSING_SENSITIVITY: 1.5 MV
MDC_IDC_SET_ZONE_DETECTION_INTERVAL: 375 MS
MDC_IDC_SET_ZONE_TYPE: NORMAL
MDC_IDC_SET_ZONE_VENDOR_TYPE: NORMAL
MDC_IDC_STAT_AT_BURDEN_PERCENT: 0 %
MDC_IDC_STAT_AT_DTM_END: NORMAL
MDC_IDC_STAT_AT_DTM_START: NORMAL
MDC_IDC_STAT_BRADY_DTM_END: NORMAL
MDC_IDC_STAT_BRADY_DTM_START: NORMAL
MDC_IDC_STAT_BRADY_RA_PERCENT_PACED: 2 %
MDC_IDC_STAT_BRADY_RV_PERCENT_PACED: 99 %
MDC_IDC_STAT_EPISODE_RECENT_COUNT: 0
MDC_IDC_STAT_EPISODE_RECENT_COUNT_DTM_END: NORMAL
MDC_IDC_STAT_EPISODE_RECENT_COUNT_DTM_START: NORMAL
MDC_IDC_STAT_EPISODE_TYPE: NORMAL
MDC_IDC_STAT_EPISODE_VENDOR_TYPE: NORMAL

## 2022-02-14 ENCOUNTER — LAB REQUISITION (OUTPATIENT)
Dept: LAB | Facility: CLINIC | Age: 67
End: 2022-02-14

## 2022-02-14 DIAGNOSIS — E03.9 HYPOTHYROIDISM, UNSPECIFIED: ICD-10-CM

## 2022-02-14 DIAGNOSIS — E78.5 HYPERLIPIDEMIA, UNSPECIFIED: ICD-10-CM

## 2022-02-14 PROCEDURE — 84443 ASSAY THYROID STIM HORMONE: CPT | Performed by: FAMILY MEDICINE

## 2022-02-14 PROCEDURE — 80061 LIPID PANEL: CPT | Performed by: FAMILY MEDICINE

## 2022-02-15 LAB
CHOLEST SERPL-MCNC: 267 MG/DL
HDLC SERPL-MCNC: 54 MG/DL
LDLC SERPL CALC-MCNC: 175 MG/DL
TRIGL SERPL-MCNC: 189 MG/DL
TSH SERPL DL<=0.005 MIU/L-ACNC: 3.99 UIU/ML (ref 0.3–5)

## 2022-03-01 ENCOUNTER — ANCILLARY PROCEDURE (OUTPATIENT)
Dept: CARDIOLOGY | Facility: CLINIC | Age: 67
End: 2022-03-01
Attending: INTERNAL MEDICINE
Payer: COMMERCIAL

## 2022-03-01 DIAGNOSIS — Z95.0 PACEMAKER: ICD-10-CM

## 2022-03-01 DIAGNOSIS — I44.2 THIRD DEGREE AV BLOCK (H): ICD-10-CM

## 2022-03-01 PROCEDURE — 93294 REM INTERROG EVL PM/LDLS PM: CPT | Performed by: INTERNAL MEDICINE

## 2022-03-01 PROCEDURE — 93296 REM INTERROG EVL PM/IDS: CPT | Performed by: INTERNAL MEDICINE

## 2022-03-18 LAB
MDC_IDC_EPISODE_DTM: NORMAL
MDC_IDC_EPISODE_ID: NORMAL
MDC_IDC_EPISODE_TYPE: NORMAL
MDC_IDC_LEAD_IMPLANT_DT: NORMAL
MDC_IDC_LEAD_IMPLANT_DT: NORMAL
MDC_IDC_LEAD_LOCATION: NORMAL
MDC_IDC_LEAD_LOCATION: NORMAL
MDC_IDC_LEAD_LOCATION_DETAIL_1: NORMAL
MDC_IDC_LEAD_LOCATION_DETAIL_1: NORMAL
MDC_IDC_LEAD_MFG: NORMAL
MDC_IDC_LEAD_MFG: NORMAL
MDC_IDC_LEAD_MODEL: NORMAL
MDC_IDC_LEAD_MODEL: NORMAL
MDC_IDC_LEAD_POLARITY_TYPE: NORMAL
MDC_IDC_LEAD_POLARITY_TYPE: NORMAL
MDC_IDC_LEAD_SERIAL: NORMAL
MDC_IDC_LEAD_SERIAL: NORMAL
MDC_IDC_MSMT_BATTERY_DTM: NORMAL
MDC_IDC_MSMT_BATTERY_REMAINING_LONGEVITY: 144 MO
MDC_IDC_MSMT_BATTERY_REMAINING_PERCENTAGE: 100 %
MDC_IDC_MSMT_BATTERY_STATUS: NORMAL
MDC_IDC_MSMT_LEADCHNL_RA_IMPEDANCE_VALUE: 556 OHM
MDC_IDC_MSMT_LEADCHNL_RA_PACING_THRESHOLD_AMPLITUDE: 0.6 V
MDC_IDC_MSMT_LEADCHNL_RA_PACING_THRESHOLD_PULSEWIDTH: 0.4 MS
MDC_IDC_MSMT_LEADCHNL_RV_IMPEDANCE_VALUE: 547 OHM
MDC_IDC_MSMT_LEADCHNL_RV_PACING_THRESHOLD_AMPLITUDE: 1.4 V
MDC_IDC_MSMT_LEADCHNL_RV_PACING_THRESHOLD_PULSEWIDTH: 0.4 MS
MDC_IDC_PG_IMPLANT_DTM: NORMAL
MDC_IDC_PG_MFG: NORMAL
MDC_IDC_PG_MODEL: NORMAL
MDC_IDC_PG_SERIAL: NORMAL
MDC_IDC_PG_TYPE: NORMAL
MDC_IDC_SESS_CLINIC_NAME: NORMAL
MDC_IDC_SESS_DTM: NORMAL
MDC_IDC_SESS_TYPE: NORMAL
MDC_IDC_SET_BRADY_AT_MODE_SWITCH_MODE: NORMAL
MDC_IDC_SET_BRADY_AT_MODE_SWITCH_RATE: 170 {BEATS}/MIN
MDC_IDC_SET_BRADY_LOWRATE: 60 {BEATS}/MIN
MDC_IDC_SET_BRADY_MAX_SENSOR_RATE: 130 {BEATS}/MIN
MDC_IDC_SET_BRADY_MAX_TRACKING_RATE: 130 {BEATS}/MIN
MDC_IDC_SET_BRADY_MODE: NORMAL
MDC_IDC_SET_BRADY_PAV_DELAY_HIGH: 150 MS
MDC_IDC_SET_BRADY_PAV_DELAY_LOW: 260 MS
MDC_IDC_SET_BRADY_SAV_DELAY_HIGH: 140 MS
MDC_IDC_SET_BRADY_SAV_DELAY_LOW: 240 MS
MDC_IDC_SET_LEADCHNL_RA_PACING_AMPLITUDE: 2 V
MDC_IDC_SET_LEADCHNL_RA_PACING_CAPTURE_MODE: NORMAL
MDC_IDC_SET_LEADCHNL_RA_PACING_POLARITY: NORMAL
MDC_IDC_SET_LEADCHNL_RA_PACING_PULSEWIDTH: 0.4 MS
MDC_IDC_SET_LEADCHNL_RA_SENSING_ADAPTATION_MODE: NORMAL
MDC_IDC_SET_LEADCHNL_RA_SENSING_POLARITY: NORMAL
MDC_IDC_SET_LEADCHNL_RA_SENSING_SENSITIVITY: 0.25 MV
MDC_IDC_SET_LEADCHNL_RV_PACING_AMPLITUDE: 1.7 V
MDC_IDC_SET_LEADCHNL_RV_PACING_CAPTURE_MODE: NORMAL
MDC_IDC_SET_LEADCHNL_RV_PACING_POLARITY: NORMAL
MDC_IDC_SET_LEADCHNL_RV_PACING_PULSEWIDTH: 0.4 MS
MDC_IDC_SET_LEADCHNL_RV_SENSING_ADAPTATION_MODE: NORMAL
MDC_IDC_SET_LEADCHNL_RV_SENSING_POLARITY: NORMAL
MDC_IDC_SET_LEADCHNL_RV_SENSING_SENSITIVITY: 1.5 MV
MDC_IDC_SET_ZONE_DETECTION_INTERVAL: 375 MS
MDC_IDC_SET_ZONE_TYPE: NORMAL
MDC_IDC_SET_ZONE_VENDOR_TYPE: NORMAL
MDC_IDC_STAT_AT_BURDEN_PERCENT: 0 %
MDC_IDC_STAT_AT_DTM_END: NORMAL
MDC_IDC_STAT_AT_DTM_START: NORMAL
MDC_IDC_STAT_BRADY_DTM_END: NORMAL
MDC_IDC_STAT_BRADY_DTM_START: NORMAL
MDC_IDC_STAT_BRADY_RA_PERCENT_PACED: 2 %
MDC_IDC_STAT_BRADY_RV_PERCENT_PACED: 99 %
MDC_IDC_STAT_EPISODE_RECENT_COUNT: 0
MDC_IDC_STAT_EPISODE_RECENT_COUNT_DTM_END: NORMAL
MDC_IDC_STAT_EPISODE_RECENT_COUNT_DTM_START: NORMAL
MDC_IDC_STAT_EPISODE_TYPE: NORMAL
MDC_IDC_STAT_EPISODE_VENDOR_TYPE: NORMAL

## 2022-03-27 ENCOUNTER — HEALTH MAINTENANCE LETTER (OUTPATIENT)
Age: 67
End: 2022-03-27

## 2022-06-08 ENCOUNTER — ANCILLARY PROCEDURE (OUTPATIENT)
Dept: CARDIOLOGY | Facility: CLINIC | Age: 67
End: 2022-06-08
Attending: INTERNAL MEDICINE
Payer: COMMERCIAL

## 2022-06-08 DIAGNOSIS — I44.2 THIRD DEGREE AV BLOCK (H): ICD-10-CM

## 2022-06-08 DIAGNOSIS — Z95.0 PACEMAKER: ICD-10-CM

## 2022-06-08 PROCEDURE — 93296 REM INTERROG EVL PM/IDS: CPT | Performed by: INTERNAL MEDICINE

## 2022-06-08 PROCEDURE — 93294 REM INTERROG EVL PM/LDLS PM: CPT | Performed by: INTERNAL MEDICINE

## 2022-06-20 LAB
MDC_IDC_EPISODE_DTM: NORMAL
MDC_IDC_EPISODE_DURATION: 1 S
MDC_IDC_EPISODE_DURATION: 14 S
MDC_IDC_EPISODE_DURATION: 18 S
MDC_IDC_EPISODE_DURATION: 2 S
MDC_IDC_EPISODE_DURATION: 3 S
MDC_IDC_EPISODE_DURATION: 4 S
MDC_IDC_EPISODE_DURATION: 7 S
MDC_IDC_EPISODE_DURATION: 8 S
MDC_IDC_EPISODE_ID: NORMAL
MDC_IDC_EPISODE_TYPE: NORMAL
MDC_IDC_LEAD_IMPLANT_DT: NORMAL
MDC_IDC_LEAD_IMPLANT_DT: NORMAL
MDC_IDC_LEAD_LOCATION: NORMAL
MDC_IDC_LEAD_LOCATION: NORMAL
MDC_IDC_LEAD_LOCATION_DETAIL_1: NORMAL
MDC_IDC_LEAD_LOCATION_DETAIL_1: NORMAL
MDC_IDC_LEAD_MFG: NORMAL
MDC_IDC_LEAD_MFG: NORMAL
MDC_IDC_LEAD_MODEL: NORMAL
MDC_IDC_LEAD_MODEL: NORMAL
MDC_IDC_LEAD_POLARITY_TYPE: NORMAL
MDC_IDC_LEAD_POLARITY_TYPE: NORMAL
MDC_IDC_LEAD_SERIAL: NORMAL
MDC_IDC_LEAD_SERIAL: NORMAL
MDC_IDC_MSMT_BATTERY_DTM: NORMAL
MDC_IDC_MSMT_BATTERY_REMAINING_LONGEVITY: 144 MO
MDC_IDC_MSMT_BATTERY_REMAINING_PERCENTAGE: 100 %
MDC_IDC_MSMT_BATTERY_STATUS: NORMAL
MDC_IDC_MSMT_LEADCHNL_RA_IMPEDANCE_VALUE: 558 OHM
MDC_IDC_MSMT_LEADCHNL_RA_PACING_THRESHOLD_AMPLITUDE: 0.6 V
MDC_IDC_MSMT_LEADCHNL_RA_PACING_THRESHOLD_PULSEWIDTH: 0.4 MS
MDC_IDC_MSMT_LEADCHNL_RV_IMPEDANCE_VALUE: 532 OHM
MDC_IDC_MSMT_LEADCHNL_RV_PACING_THRESHOLD_AMPLITUDE: 1.4 V
MDC_IDC_MSMT_LEADCHNL_RV_PACING_THRESHOLD_PULSEWIDTH: 0.4 MS
MDC_IDC_PG_IMPLANT_DTM: NORMAL
MDC_IDC_PG_MFG: NORMAL
MDC_IDC_PG_MODEL: NORMAL
MDC_IDC_PG_SERIAL: NORMAL
MDC_IDC_PG_TYPE: NORMAL
MDC_IDC_SESS_CLINIC_NAME: NORMAL
MDC_IDC_SESS_DTM: NORMAL
MDC_IDC_SESS_TYPE: NORMAL
MDC_IDC_SET_BRADY_AT_MODE_SWITCH_MODE: NORMAL
MDC_IDC_SET_BRADY_AT_MODE_SWITCH_RATE: 170 {BEATS}/MIN
MDC_IDC_SET_BRADY_LOWRATE: 60 {BEATS}/MIN
MDC_IDC_SET_BRADY_MAX_SENSOR_RATE: 130 {BEATS}/MIN
MDC_IDC_SET_BRADY_MAX_TRACKING_RATE: 130 {BEATS}/MIN
MDC_IDC_SET_BRADY_MODE: NORMAL
MDC_IDC_SET_BRADY_PAV_DELAY_HIGH: 150 MS
MDC_IDC_SET_BRADY_PAV_DELAY_LOW: 260 MS
MDC_IDC_SET_BRADY_SAV_DELAY_HIGH: 140 MS
MDC_IDC_SET_BRADY_SAV_DELAY_LOW: 240 MS
MDC_IDC_SET_LEADCHNL_RA_PACING_AMPLITUDE: 2 V
MDC_IDC_SET_LEADCHNL_RA_PACING_CAPTURE_MODE: NORMAL
MDC_IDC_SET_LEADCHNL_RA_PACING_POLARITY: NORMAL
MDC_IDC_SET_LEADCHNL_RA_PACING_PULSEWIDTH: 0.4 MS
MDC_IDC_SET_LEADCHNL_RA_SENSING_ADAPTATION_MODE: NORMAL
MDC_IDC_SET_LEADCHNL_RA_SENSING_POLARITY: NORMAL
MDC_IDC_SET_LEADCHNL_RA_SENSING_SENSITIVITY: 0.25 MV
MDC_IDC_SET_LEADCHNL_RV_PACING_AMPLITUDE: 1.7 V
MDC_IDC_SET_LEADCHNL_RV_PACING_CAPTURE_MODE: NORMAL
MDC_IDC_SET_LEADCHNL_RV_PACING_POLARITY: NORMAL
MDC_IDC_SET_LEADCHNL_RV_PACING_PULSEWIDTH: 0.4 MS
MDC_IDC_SET_LEADCHNL_RV_SENSING_ADAPTATION_MODE: NORMAL
MDC_IDC_SET_LEADCHNL_RV_SENSING_POLARITY: NORMAL
MDC_IDC_SET_LEADCHNL_RV_SENSING_SENSITIVITY: 1.5 MV
MDC_IDC_SET_ZONE_DETECTION_INTERVAL: 375 MS
MDC_IDC_SET_ZONE_TYPE: NORMAL
MDC_IDC_SET_ZONE_VENDOR_TYPE: NORMAL
MDC_IDC_STAT_AT_BURDEN_PERCENT: 1 %
MDC_IDC_STAT_AT_DTM_END: NORMAL
MDC_IDC_STAT_AT_DTM_START: NORMAL
MDC_IDC_STAT_BRADY_DTM_END: NORMAL
MDC_IDC_STAT_BRADY_DTM_START: NORMAL
MDC_IDC_STAT_BRADY_RA_PERCENT_PACED: 2 %
MDC_IDC_STAT_BRADY_RV_PERCENT_PACED: 99 %
MDC_IDC_STAT_EPISODE_RECENT_COUNT: 0
MDC_IDC_STAT_EPISODE_RECENT_COUNT: 27
MDC_IDC_STAT_EPISODE_RECENT_COUNT_DTM_END: NORMAL
MDC_IDC_STAT_EPISODE_RECENT_COUNT_DTM_START: NORMAL
MDC_IDC_STAT_EPISODE_TYPE: NORMAL
MDC_IDC_STAT_EPISODE_VENDOR_TYPE: NORMAL

## 2022-08-05 ENCOUNTER — MYC MEDICAL ADVICE (OUTPATIENT)
Dept: CARDIOLOGY | Facility: CLINIC | Age: 67
End: 2022-08-05

## 2022-08-05 ENCOUNTER — OFFICE VISIT (OUTPATIENT)
Dept: CARDIOLOGY | Facility: CLINIC | Age: 67
End: 2022-08-05
Payer: COMMERCIAL

## 2022-08-05 VITALS
BODY MASS INDEX: 32.57 KG/M2 | SYSTOLIC BLOOD PRESSURE: 137 MMHG | HEIGHT: 62 IN | RESPIRATION RATE: 16 BRPM | HEART RATE: 70 BPM | WEIGHT: 177 LBS | DIASTOLIC BLOOD PRESSURE: 83 MMHG

## 2022-08-05 DIAGNOSIS — R00.1 BRADYCARDIA: ICD-10-CM

## 2022-08-05 DIAGNOSIS — I10 HYPERTENSION, UNSPECIFIED TYPE: Primary | ICD-10-CM

## 2022-08-05 DIAGNOSIS — Z95.0 CARDIAC PACEMAKER IN SITU: ICD-10-CM

## 2022-08-05 DIAGNOSIS — E78.5 HYPERLIPIDEMIA LDL GOAL <100: ICD-10-CM

## 2022-08-05 PROCEDURE — 99214 OFFICE O/P EST MOD 30 MIN: CPT | Performed by: INTERNAL MEDICINE

## 2022-08-05 RX ORDER — ATORVASTATIN CALCIUM 40 MG/1
40 TABLET, FILM COATED ORAL DAILY
Qty: 90 TABLET | Refills: 4 | Status: SHIPPED | OUTPATIENT
Start: 2022-08-05 | End: 2023-11-02

## 2022-08-05 NOTE — LETTER
8/5/2022    Eve Brewer MD  [facility] [address]  [place] [address]    RE: Tameka Avalos       Dear Colleague,     I had the pleasure of seeing Tameka Avalos in the [facility].  Heart Care Clinical Progress Note      Ms. Tameka Avalos is referred by Dr. Valdovinos to the [facility] team to evaluate hx CAD.      Assessment/Recommendations   Assessment:    1.  Permanent pacemaker placement for second-degree heart block 2021 [place]   2.  Hypertension  3.  Hyperlipidemia   Plan:  Given her history of hypertension and these high cholesterol readings I would suggest starting cholesterol therapy.  We will start on atorvastatin 4 nightly.  Arrange for fasting lipid profile in 6 weeks.  Continue with regular activity.  She had many questions about blood pressure management and the meds she is on.  Since she has coexistent renal issues and her medications have been managed by nephrology I would defer these decisions to Dr. Vivar       History of Present Illness/Subjective    Ms. Tameka Avalos is a 67 year old female with history of pacemaker placement.  She is also history of renal failure and hypertension.  More recently she is found to have a high lipid profile with an LDL of 175.  She has not had coronary artery disease.  No chest pain pressure or angina.  Breathing has been stable.  Continues remain active with walking.  Tolerates activities well.  No dizziness or lightheadedness.  She has not been a lipid therapy in the past                   Physical Examination Review of Systems   There were no vitals taken for this visit.  There is no height or weight on file to calculate BMI.  Wt Readings from Last 5 Encounters:   08/02/21 81.2 kg (179 lb)   02/09/21 77.6 kg (171 lb)     BP Readings from Last 5 Encounters:   08/02/21 (!) 162/90   02/09/21 (!) 172/79     Pulse Readings from Last 5 Encounters:   08/02/21 74   02/09/21 66       General:    Alert, appears stated age and cooperative  normocephalic, without obvious abnormality   ENT/Mouth: Membranes moist, no oral lesions or bleeding gums.      EYES:  No scleral icterus, normal conjunctivae   Neck: No carotid bruits or thyromegaly   Chest/Lungs:   Lungs are clear to auscultation, no rales or wheezing,    Cardiovascular:    S1-S2 distinct and regular.  A 2/6 soft crescendo decrescendo murmur at the sternal border.  No S3-S4 was heard   Abdomen:  Normal bowel tones   Extremities: No cyanosis or clubbing, pulses intact  No edema bilaterally   Skin: Color, texture normal.    Neurologic: No focal neurologic deficits                                                    Medical History  Surgical History Family History Social History   No past medical history on file. Past Surgical History:   Procedure Laterality Date     EP PACEMAKER Left 2/8/2021    Procedure: EP Pacemaker;  Surgeon: Steven Lozano MD;  Location:  HEART CARDIAC CATH LAB    No family history on file. Social History     Socioeconomic History     Marital status:      Spouse name: Not on file     Number of children: Not on file     Years of education: Not on file     Highest education level: Not on file   Occupational History     Not on file   Tobacco Use     Smoking status: Not on file     Smokeless tobacco: Not on file   Substance and Sexual Activity     Alcohol use: Not on file     Drug use: Not on file     Sexual activity: Not on file   Other Topics Concern     Not on file   Social History Narrative     Not on file     Social Determinants of Health     Financial Resource Strain: Not on file   Food Insecurity: Not on file   Transportation Needs: Not on file   Physical Activity: Not on file   Stress: Not on file   Social Connections: Not on file   Intimate Partner Violence: Not on file   Housing Stability: Not on file          Medications  Allergies   Current Outpatient Medications   Medication Sig Dispense Refill     ferrous  sulfate 140 (45 Fe) MG TBCR CR tablet Take 140 mg by mouth 2 times daily  (Patient not taking: Reported on 8/2/2021)       hydrALAZINE (APRESOLINE) 50 MG tablet Take 1 tablet (50 mg) by mouth 2 times daily 60 tablet 0     hydrochlorothiazide (MICROZIDE) 12.5 MG capsule Take 2 capsules (25 mg) by mouth daily 60 capsule 0     levothyroxine (SYNTHROID/LEVOTHROID) 50 MCG tablet Take 50 mcg by mouth daily       losartan (COZAAR) 50 MG tablet Take 1 tablet (50 mg) by mouth 2 times daily 60 tablet 0     neomycin-bacitracin-polymyxin (NEOSPORIN) 5-400-5000 ointment Apply topically daily as needed  (Patient not taking: Reported on 8/2/2021)       omeprazole (PRILOSEC) 40 MG DR capsule Take 40 mg by mouth daily       vitamin D3 (CHOLECALCIFEROL) 50 mcg (2000 units) tablet Take 1 tablet by mouth daily      No Known Allergies      Lab Results    Chemistry/lipid CBC Cardiac Enzymes/BNP/TSH/INR   Lab Results   Component Value Date    CHOL 267 (H) 02/14/2022    HDL 54 02/14/2022    TRIG 189 (H) 02/14/2022    BUN 19 11/01/2021     11/01/2021    CO2 22 11/01/2021    Lab Results   Component Value Date    WBC 7.4 11/01/2021    HGB 14.1 11/01/2021    HCT 43.4 11/01/2021    MCV 93 11/01/2021     11/01/2021    Lab Results   Component Value Date    TROPONINI <0.01 02/05/2021    TSH 3.99 02/14/2022    INR 1.04 02/07/2021         Clinical evaluation time today including exam 25 minutes.      At least 50% of clinic evaluation time involved in assessment and patient counseling.      Part of this chart was created using a dictation software.  Typographic errors, word substitutions, and grammatical errors may unintentionally occur.        Ricardo Lopez M.D.  [facility]      Thank you for allowing me to participate in the care of your patient.      Sincerely,     MD LATISHA oDan [facility]  cc:   Referred Self,

## 2022-08-05 NOTE — PROGRESS NOTES
Heart Care Clinical Progress Note      Ms. Tameka Avalos is referred by Dr. Valdovinos to the [facility] team to evaluate hx CAD.      Assessment/Recommendations   Assessment:    1.  Permanent pacemaker placement for second-degree heart block 2021 [place]   2.  Hypertension  3.  Hyperlipidemia   Plan:  Given her history of hypertension and these high cholesterol readings I would suggest starting cholesterol therapy.  We will start on atorvastatin 4 nightly.  Arrange for fasting lipid profile in 6 weeks.  Continue with regular activity.  She had many questions about blood pressure management and the meds she is on.  Since she has coexistent renal issues and her medications have been managed by nephrology I would defer these decisions to Dr. Vivar       History of Present Illness/Subjective    Ms. Tameka Avalos is a 67 year old female with history of pacemaker placement.  She is also history of renal failure and hypertension.  More recently she is found to have a high lipid profile with an LDL of 175.  She has not had coronary artery disease.  No chest pain pressure or angina.  Breathing has been stable.  Continues remain active with walking.  Tolerates activities well.  No dizziness or lightheadedness.  She has not been a lipid therapy in the past                   Physical Examination Review of Systems   There were no vitals taken for this visit.  There is no height or weight on file to calculate BMI.  Wt Readings from Last 5 Encounters:   08/02/21 81.2 kg (179 lb)   02/09/21 77.6 kg (171 lb)     BP Readings from Last 5 Encounters:   08/02/21 (!) 162/90   02/09/21 (!) 172/79     Pulse Readings from Last 5 Encounters:   08/02/21 74   02/09/21 66       General:   Alert, appears stated age and cooperative  normocephalic, without obvious abnormality   ENT/Mouth: Membranes moist, no oral lesions or bleeding gums.      EYES:  No scleral icterus, normal conjunctivae   Neck: No carotid bruits or  thyromegaly   Chest/Lungs:   Lungs are clear to auscultation, no rales or wheezing,    Cardiovascular:    S1-S2 distinct and regular.  A 2/6 soft crescendo decrescendo murmur at the sternal border.  No S3-S4 was heard   Abdomen:  Normal bowel tones   Extremities: No cyanosis or clubbing, pulses intact  No edema bilaterally   Skin: Color, texture normal.    Neurologic: No focal neurologic deficits                                                    Medical History  Surgical History Family History Social History   No past medical history on file. Past Surgical History:   Procedure Laterality Date     EP PACEMAKER Left 2/8/2021    Procedure: EP Pacemaker;  Surgeon: Steven Lozano MD;  Location:  HEART CARDIAC CATH LAB    No family history on file. Social History     Socioeconomic History     Marital status:      Spouse name: Not on file     Number of children: Not on file     Years of education: Not on file     Highest education level: Not on file   Occupational History     Not on file   Tobacco Use     Smoking status: Not on file     Smokeless tobacco: Not on file   Substance and Sexual Activity     Alcohol use: Not on file     Drug use: Not on file     Sexual activity: Not on file   Other Topics Concern     Not on file   Social History Narrative     Not on file     Social Determinants of Health     Financial Resource Strain: Not on file   Food Insecurity: Not on file   Transportation Needs: Not on file   Physical Activity: Not on file   Stress: Not on file   Social Connections: Not on file   Intimate Partner Violence: Not on file   Housing Stability: Not on file          Medications  Allergies   Current Outpatient Medications   Medication Sig Dispense Refill     ferrous sulfate 140 (45 Fe) MG TBCR CR tablet Take 140 mg by mouth 2 times daily  (Patient not taking: Reported on 8/2/2021)       hydrALAZINE (APRESOLINE) 50 MG tablet Take 1 tablet (50 mg) by mouth 2 times daily 60 tablet 0      hydrochlorothiazide (MICROZIDE) 12.5 MG capsule Take 2 capsules (25 mg) by mouth daily 60 capsule 0     levothyroxine (SYNTHROID/LEVOTHROID) 50 MCG tablet Take 50 mcg by mouth daily       losartan (COZAAR) 50 MG tablet Take 1 tablet (50 mg) by mouth 2 times daily 60 tablet 0     neomycin-bacitracin-polymyxin (NEOSPORIN) 5-400-5000 ointment Apply topically daily as needed  (Patient not taking: Reported on 8/2/2021)       omeprazole (PRILOSEC) 40 MG DR capsule Take 40 mg by mouth daily       vitamin D3 (CHOLECALCIFEROL) 50 mcg (2000 units) tablet Take 1 tablet by mouth daily      No Known Allergies      Lab Results    Chemistry/lipid CBC Cardiac Enzymes/BNP/TSH/INR   Lab Results   Component Value Date    CHOL 267 (H) 02/14/2022    HDL 54 02/14/2022    TRIG 189 (H) 02/14/2022    BUN 19 11/01/2021     11/01/2021    CO2 22 11/01/2021    Lab Results   Component Value Date    WBC 7.4 11/01/2021    HGB 14.1 11/01/2021    HCT 43.4 11/01/2021    MCV 93 11/01/2021     11/01/2021    Lab Results   Component Value Date    TROPONINI <0.01 02/05/2021    TSH 3.99 02/14/2022    INR 1.04 02/07/2021         Clinical evaluation time today including exam 25 minutes.      At least 50% of clinic evaluation time involved in assessment and patient counseling.      Part of this chart was created using a dictation software.  Typographic errors, word substitutions, and grammatical errors may unintentionally occur.        Ricardo Lopez M.D.  [facility]

## 2022-08-18 ENCOUNTER — LAB (OUTPATIENT)
Dept: CARDIOLOGY | Facility: CLINIC | Age: 67
End: 2022-08-18
Payer: COMMERCIAL

## 2022-08-18 DIAGNOSIS — E78.5 HYPERLIPIDEMIA LDL GOAL <100: ICD-10-CM

## 2022-08-18 LAB
ALBUMIN SERPL-MCNC: 3.7 G/DL (ref 3.5–5)
ALP SERPL-CCNC: 72 U/L (ref 45–120)
ALT SERPL W P-5'-P-CCNC: 10 U/L (ref 0–45)
AST SERPL W P-5'-P-CCNC: 15 U/L (ref 0–40)
BILIRUB DIRECT SERPL-MCNC: 0.2 MG/DL
BILIRUB SERPL-MCNC: 0.5 MG/DL (ref 0–1)
CHOLEST SERPL-MCNC: 172 MG/DL
FASTING STATUS PATIENT QL REPORTED: YES
HDLC SERPL-MCNC: 49 MG/DL
LDLC SERPL CALC-MCNC: 101 MG/DL
PROT SERPL-MCNC: 6.5 G/DL (ref 6–8)
TRIGL SERPL-MCNC: 111 MG/DL

## 2022-08-18 PROCEDURE — 80076 HEPATIC FUNCTION PANEL: CPT

## 2022-08-18 PROCEDURE — 36415 COLL VENOUS BLD VENIPUNCTURE: CPT

## 2022-08-18 PROCEDURE — 80061 LIPID PANEL: CPT

## 2022-08-19 DIAGNOSIS — E78.5 HYPERLIPIDEMIA LDL GOAL <100: Primary | ICD-10-CM

## 2022-08-23 ENCOUNTER — ANCILLARY PROCEDURE (OUTPATIENT)
Dept: CARDIOLOGY | Facility: CLINIC | Age: 67
End: 2022-08-23
Attending: INTERNAL MEDICINE
Payer: COMMERCIAL

## 2022-08-23 DIAGNOSIS — Z95.0 PACEMAKER: ICD-10-CM

## 2022-08-23 DIAGNOSIS — I49.5 SICK SINUS SYNDROME (H): Primary | ICD-10-CM

## 2022-08-23 DIAGNOSIS — I44.2 COMPLETE ATRIOVENTRICULAR BLOCK (H): ICD-10-CM

## 2022-08-23 LAB
MDC_IDC_LEAD_IMPLANT_DT: NORMAL
MDC_IDC_LEAD_IMPLANT_DT: NORMAL
MDC_IDC_LEAD_LOCATION: NORMAL
MDC_IDC_LEAD_LOCATION: NORMAL
MDC_IDC_LEAD_LOCATION_DETAIL_1: NORMAL
MDC_IDC_LEAD_LOCATION_DETAIL_1: NORMAL
MDC_IDC_LEAD_MFG: NORMAL
MDC_IDC_LEAD_MFG: NORMAL
MDC_IDC_LEAD_MODEL: NORMAL
MDC_IDC_LEAD_MODEL: NORMAL
MDC_IDC_LEAD_POLARITY_TYPE: NORMAL
MDC_IDC_LEAD_POLARITY_TYPE: NORMAL
MDC_IDC_LEAD_SERIAL: NORMAL
MDC_IDC_LEAD_SERIAL: NORMAL
MDC_IDC_MSMT_BATTERY_DTM: NORMAL
MDC_IDC_MSMT_BATTERY_REMAINING_LONGEVITY: 138 MO
MDC_IDC_MSMT_BATTERY_REMAINING_PERCENTAGE: 100 %
MDC_IDC_MSMT_BATTERY_STATUS: NORMAL
MDC_IDC_MSMT_LEADCHNL_RA_IMPEDANCE_VALUE: 571 OHM
MDC_IDC_MSMT_LEADCHNL_RA_PACING_THRESHOLD_AMPLITUDE: 0.7 V
MDC_IDC_MSMT_LEADCHNL_RA_PACING_THRESHOLD_AMPLITUDE: 0.7 V
MDC_IDC_MSMT_LEADCHNL_RA_PACING_THRESHOLD_PULSEWIDTH: 0.4 MS
MDC_IDC_MSMT_LEADCHNL_RA_PACING_THRESHOLD_PULSEWIDTH: 0.4 MS
MDC_IDC_MSMT_LEADCHNL_RA_SENSING_INTR_AMPL: 6.6 MV
MDC_IDC_MSMT_LEADCHNL_RV_IMPEDANCE_VALUE: 556 OHM
MDC_IDC_MSMT_LEADCHNL_RV_PACING_THRESHOLD_AMPLITUDE: 1 V
MDC_IDC_MSMT_LEADCHNL_RV_PACING_THRESHOLD_AMPLITUDE: 1 V
MDC_IDC_MSMT_LEADCHNL_RV_PACING_THRESHOLD_PULSEWIDTH: 0.4 MS
MDC_IDC_MSMT_LEADCHNL_RV_PACING_THRESHOLD_PULSEWIDTH: 0.4 MS
MDC_IDC_MSMT_LEADCHNL_RV_SENSING_INTR_AMPL: 10.8 MV
MDC_IDC_PG_IMPLANT_DTM: NORMAL
MDC_IDC_PG_MFG: NORMAL
MDC_IDC_PG_MODEL: NORMAL
MDC_IDC_PG_SERIAL: NORMAL
MDC_IDC_PG_TYPE: NORMAL
MDC_IDC_SESS_CLINIC_NAME: NORMAL
MDC_IDC_SESS_DTM: NORMAL
MDC_IDC_SESS_TYPE: NORMAL
MDC_IDC_SET_BRADY_AT_MODE_SWITCH_MODE: NORMAL
MDC_IDC_SET_BRADY_AT_MODE_SWITCH_RATE: 170 {BEATS}/MIN
MDC_IDC_SET_BRADY_LOWRATE: 60 {BEATS}/MIN
MDC_IDC_SET_BRADY_MAX_SENSOR_RATE: 130 {BEATS}/MIN
MDC_IDC_SET_BRADY_MAX_TRACKING_RATE: 130 {BEATS}/MIN
MDC_IDC_SET_BRADY_MODE: NORMAL
MDC_IDC_SET_BRADY_PAV_DELAY_HIGH: 150 MS
MDC_IDC_SET_BRADY_PAV_DELAY_LOW: 200 MS
MDC_IDC_SET_BRADY_SAV_DELAY_HIGH: 150 MS
MDC_IDC_SET_BRADY_SAV_DELAY_LOW: 200 MS
MDC_IDC_SET_LEADCHNL_RA_PACING_AMPLITUDE: NORMAL
MDC_IDC_SET_LEADCHNL_RA_PACING_CAPTURE_MODE: NORMAL
MDC_IDC_SET_LEADCHNL_RA_PACING_POLARITY: NORMAL
MDC_IDC_SET_LEADCHNL_RA_PACING_PULSEWIDTH: 0.4 MS
MDC_IDC_SET_LEADCHNL_RA_SENSING_ADAPTATION_MODE: NORMAL
MDC_IDC_SET_LEADCHNL_RA_SENSING_POLARITY: NORMAL
MDC_IDC_SET_LEADCHNL_RA_SENSING_SENSITIVITY: 0.25 MV
MDC_IDC_SET_LEADCHNL_RV_PACING_AMPLITUDE: NORMAL
MDC_IDC_SET_LEADCHNL_RV_PACING_CAPTURE_MODE: NORMAL
MDC_IDC_SET_LEADCHNL_RV_PACING_POLARITY: NORMAL
MDC_IDC_SET_LEADCHNL_RV_PACING_PULSEWIDTH: 0.4 MS
MDC_IDC_SET_LEADCHNL_RV_SENSING_ADAPTATION_MODE: NORMAL
MDC_IDC_SET_LEADCHNL_RV_SENSING_POLARITY: NORMAL
MDC_IDC_SET_LEADCHNL_RV_SENSING_SENSITIVITY: 1.5 MV
MDC_IDC_SET_ZONE_DETECTION_INTERVAL: 375 MS
MDC_IDC_SET_ZONE_TYPE: NORMAL
MDC_IDC_SET_ZONE_VENDOR_TYPE: NORMAL
MDC_IDC_STAT_AT_BURDEN_PERCENT: 1 %
MDC_IDC_STAT_AT_DTM_END: NORMAL
MDC_IDC_STAT_AT_DTM_START: NORMAL
MDC_IDC_STAT_AT_MODE_SW_COUNT: 32
MDC_IDC_STAT_BRADY_DTM_END: NORMAL
MDC_IDC_STAT_BRADY_DTM_START: NORMAL
MDC_IDC_STAT_BRADY_RA_PERCENT_PACED: 2 %
MDC_IDC_STAT_BRADY_RV_PERCENT_PACED: 99 %
MDC_IDC_STAT_EPISODE_RECENT_COUNT: 0
MDC_IDC_STAT_EPISODE_RECENT_COUNT: 32
MDC_IDC_STAT_EPISODE_RECENT_COUNT_DTM_END: NORMAL
MDC_IDC_STAT_EPISODE_RECENT_COUNT_DTM_START: NORMAL
MDC_IDC_STAT_EPISODE_TYPE: NORMAL
MDC_IDC_STAT_EPISODE_VENDOR_TYPE: NORMAL

## 2022-08-23 PROCEDURE — 93280 PM DEVICE PROGR EVAL DUAL: CPT | Performed by: INTERNAL MEDICINE

## 2022-09-25 ENCOUNTER — HEALTH MAINTENANCE LETTER (OUTPATIENT)
Age: 67
End: 2022-09-25

## 2022-11-28 ENCOUNTER — ANCILLARY PROCEDURE (OUTPATIENT)
Dept: CARDIOLOGY | Facility: CLINIC | Age: 67
End: 2022-11-28
Attending: INTERNAL MEDICINE
Payer: COMMERCIAL

## 2022-11-28 DIAGNOSIS — Z95.0 CARDIAC PACEMAKER IN SITU: ICD-10-CM

## 2022-11-28 DIAGNOSIS — I49.5 SICK SINUS SYNDROME (H): ICD-10-CM

## 2022-11-28 PROCEDURE — 93296 REM INTERROG EVL PM/IDS: CPT | Performed by: INTERNAL MEDICINE

## 2022-11-28 PROCEDURE — 93294 REM INTERROG EVL PM/LDLS PM: CPT | Performed by: INTERNAL MEDICINE

## 2022-12-20 LAB
MDC_IDC_EPISODE_DTM: NORMAL
MDC_IDC_EPISODE_DTM: NORMAL
MDC_IDC_EPISODE_ID: NORMAL
MDC_IDC_EPISODE_ID: NORMAL
MDC_IDC_EPISODE_TYPE: NORMAL
MDC_IDC_EPISODE_TYPE: NORMAL
MDC_IDC_LEAD_IMPLANT_DT: NORMAL
MDC_IDC_LEAD_IMPLANT_DT: NORMAL
MDC_IDC_LEAD_LOCATION: NORMAL
MDC_IDC_LEAD_LOCATION: NORMAL
MDC_IDC_LEAD_LOCATION_DETAIL_1: NORMAL
MDC_IDC_LEAD_LOCATION_DETAIL_1: NORMAL
MDC_IDC_LEAD_MFG: NORMAL
MDC_IDC_LEAD_MFG: NORMAL
MDC_IDC_LEAD_MODEL: NORMAL
MDC_IDC_LEAD_MODEL: NORMAL
MDC_IDC_LEAD_POLARITY_TYPE: NORMAL
MDC_IDC_LEAD_POLARITY_TYPE: NORMAL
MDC_IDC_LEAD_SERIAL: NORMAL
MDC_IDC_LEAD_SERIAL: NORMAL
MDC_IDC_MSMT_BATTERY_DTM: NORMAL
MDC_IDC_MSMT_BATTERY_REMAINING_LONGEVITY: 138 MO
MDC_IDC_MSMT_BATTERY_REMAINING_PERCENTAGE: 100 %
MDC_IDC_MSMT_BATTERY_STATUS: NORMAL
MDC_IDC_MSMT_LEADCHNL_RA_IMPEDANCE_VALUE: 543 OHM
MDC_IDC_MSMT_LEADCHNL_RA_PACING_THRESHOLD_AMPLITUDE: 0.5 V
MDC_IDC_MSMT_LEADCHNL_RA_PACING_THRESHOLD_PULSEWIDTH: 0.4 MS
MDC_IDC_MSMT_LEADCHNL_RV_IMPEDANCE_VALUE: 544 OHM
MDC_IDC_MSMT_LEADCHNL_RV_PACING_THRESHOLD_AMPLITUDE: 1.2 V
MDC_IDC_MSMT_LEADCHNL_RV_PACING_THRESHOLD_PULSEWIDTH: 0.4 MS
MDC_IDC_PG_IMPLANT_DTM: NORMAL
MDC_IDC_PG_MFG: NORMAL
MDC_IDC_PG_MODEL: NORMAL
MDC_IDC_PG_SERIAL: NORMAL
MDC_IDC_PG_TYPE: NORMAL
MDC_IDC_SESS_CLINIC_NAME: NORMAL
MDC_IDC_SESS_DTM: NORMAL
MDC_IDC_SESS_TYPE: NORMAL
MDC_IDC_SET_BRADY_AT_MODE_SWITCH_MODE: NORMAL
MDC_IDC_SET_BRADY_AT_MODE_SWITCH_RATE: 170 {BEATS}/MIN
MDC_IDC_SET_BRADY_LOWRATE: 60 {BEATS}/MIN
MDC_IDC_SET_BRADY_MAX_SENSOR_RATE: 130 {BEATS}/MIN
MDC_IDC_SET_BRADY_MAX_TRACKING_RATE: 130 {BEATS}/MIN
MDC_IDC_SET_BRADY_MODE: NORMAL
MDC_IDC_SET_BRADY_PAV_DELAY_HIGH: 150 MS
MDC_IDC_SET_BRADY_PAV_DELAY_LOW: 200 MS
MDC_IDC_SET_BRADY_SAV_DELAY_HIGH: 150 MS
MDC_IDC_SET_BRADY_SAV_DELAY_LOW: 200 MS
MDC_IDC_SET_LEADCHNL_RA_PACING_AMPLITUDE: 2 V
MDC_IDC_SET_LEADCHNL_RA_PACING_CAPTURE_MODE: NORMAL
MDC_IDC_SET_LEADCHNL_RA_PACING_POLARITY: NORMAL
MDC_IDC_SET_LEADCHNL_RA_PACING_PULSEWIDTH: 0.4 MS
MDC_IDC_SET_LEADCHNL_RA_SENSING_ADAPTATION_MODE: NORMAL
MDC_IDC_SET_LEADCHNL_RA_SENSING_POLARITY: NORMAL
MDC_IDC_SET_LEADCHNL_RA_SENSING_SENSITIVITY: 0.25 MV
MDC_IDC_SET_LEADCHNL_RV_PACING_AMPLITUDE: 1.7 V
MDC_IDC_SET_LEADCHNL_RV_PACING_CAPTURE_MODE: NORMAL
MDC_IDC_SET_LEADCHNL_RV_PACING_POLARITY: NORMAL
MDC_IDC_SET_LEADCHNL_RV_PACING_PULSEWIDTH: 0.4 MS
MDC_IDC_SET_LEADCHNL_RV_SENSING_ADAPTATION_MODE: NORMAL
MDC_IDC_SET_LEADCHNL_RV_SENSING_POLARITY: NORMAL
MDC_IDC_SET_LEADCHNL_RV_SENSING_SENSITIVITY: 1.5 MV
MDC_IDC_SET_ZONE_DETECTION_INTERVAL: 375 MS
MDC_IDC_SET_ZONE_TYPE: NORMAL
MDC_IDC_SET_ZONE_VENDOR_TYPE: NORMAL
MDC_IDC_STAT_AT_BURDEN_PERCENT: 0 %
MDC_IDC_STAT_AT_DTM_END: NORMAL
MDC_IDC_STAT_AT_DTM_START: NORMAL
MDC_IDC_STAT_BRADY_DTM_END: NORMAL
MDC_IDC_STAT_BRADY_DTM_START: NORMAL
MDC_IDC_STAT_BRADY_RA_PERCENT_PACED: 6 %
MDC_IDC_STAT_BRADY_RV_PERCENT_PACED: 98 %
MDC_IDC_STAT_EPISODE_RECENT_COUNT: 0
MDC_IDC_STAT_EPISODE_RECENT_COUNT_DTM_END: NORMAL
MDC_IDC_STAT_EPISODE_RECENT_COUNT_DTM_START: NORMAL
MDC_IDC_STAT_EPISODE_TYPE: NORMAL
MDC_IDC_STAT_EPISODE_VENDOR_TYPE: NORMAL

## 2023-02-08 ENCOUNTER — TRANSFERRED RECORDS (OUTPATIENT)
Dept: HEALTH INFORMATION MANAGEMENT | Facility: CLINIC | Age: 68
End: 2023-02-08
Payer: COMMERCIAL

## 2023-02-08 ENCOUNTER — LAB REQUISITION (OUTPATIENT)
Dept: LAB | Facility: CLINIC | Age: 68
End: 2023-02-08

## 2023-02-08 DIAGNOSIS — E78.5 HYPERLIPIDEMIA, UNSPECIFIED: ICD-10-CM

## 2023-02-08 DIAGNOSIS — E03.9 HYPOTHYROIDISM, UNSPECIFIED: ICD-10-CM

## 2023-02-08 PROCEDURE — 80061 LIPID PANEL: CPT | Performed by: FAMILY MEDICINE

## 2023-02-08 PROCEDURE — 84443 ASSAY THYROID STIM HORMONE: CPT | Performed by: FAMILY MEDICINE

## 2023-02-08 PROCEDURE — 84439 ASSAY OF FREE THYROXINE: CPT | Performed by: FAMILY MEDICINE

## 2023-02-09 LAB
CHOLEST SERPL-MCNC: 129 MG/DL
HDLC SERPL-MCNC: 49 MG/DL
LDLC SERPL CALC-MCNC: 60 MG/DL
NONHDLC SERPL-MCNC: 80 MG/DL
T4 FREE SERPL-MCNC: 0.93 NG/DL (ref 0.9–1.7)
TRIGL SERPL-MCNC: 100 MG/DL
TSH SERPL DL<=0.005 MIU/L-ACNC: 12.3 UIU/ML (ref 0.3–4.2)

## 2023-02-21 ENCOUNTER — OFFICE VISIT (OUTPATIENT)
Dept: CARDIOLOGY | Facility: CLINIC | Age: 68
End: 2023-02-21
Payer: COMMERCIAL

## 2023-02-21 ENCOUNTER — LAB (OUTPATIENT)
Dept: LAB | Facility: CLINIC | Age: 68
End: 2023-02-21
Payer: COMMERCIAL

## 2023-02-21 VITALS
HEIGHT: 62 IN | WEIGHT: 179 LBS | RESPIRATION RATE: 28 BRPM | DIASTOLIC BLOOD PRESSURE: 78 MMHG | HEART RATE: 77 BPM | BODY MASS INDEX: 32.94 KG/M2 | SYSTOLIC BLOOD PRESSURE: 115 MMHG

## 2023-02-21 DIAGNOSIS — E78.5 HYPERLIPIDEMIA LDL GOAL <100: ICD-10-CM

## 2023-02-21 DIAGNOSIS — I44.1 SECOND DEGREE HEART BLOCK: Primary | ICD-10-CM

## 2023-02-21 LAB
CHOLEST SERPL-MCNC: 144 MG/DL
HDLC SERPL-MCNC: 49 MG/DL
LDLC SERPL CALC-MCNC: 77 MG/DL
NONHDLC SERPL-MCNC: 95 MG/DL
TRIGL SERPL-MCNC: 88 MG/DL

## 2023-02-21 PROCEDURE — 80061 LIPID PANEL: CPT

## 2023-02-21 PROCEDURE — 99214 OFFICE O/P EST MOD 30 MIN: CPT | Performed by: INTERNAL MEDICINE

## 2023-02-21 PROCEDURE — 36415 COLL VENOUS BLD VENIPUNCTURE: CPT

## 2023-02-21 NOTE — LETTER
2/21/2023    Eve Brewer MD  [address]  [place] [address]    RE: Tameka Avalos       Dear Colleague,     I had the pleasure of seeing Tame[facility] Avalos in the [facility].    HEART CARE ENCOUNTER CONSULTATON NOTE      [facility]  [phone]      Assessment/Recommendations   Assessment/Plan:  Second degree heart block s/p dual chamber pacer - device functioning well and she feels well.  She is pacing her RV 98% and we discussed that over time this may result in heart failure. We discussed symptoms to watch for.    Hyperlipidemia - LDL at goal on statin. Diet and exercise discussed.    Hypertension - controlled with current meds, follows with nephrology    Mild MR - murmur heard    F/U 12 months       History of Present Illness/Subjective    HPI: Tameka Avalos is a 67 year old female with hypertension, hyperlipidemia, renal insufficiency, hypothyroidism, and type 2 heart block s/p dual chamber pacemaker 2/2021. She comes to establish care today after Dr. Lopez's [facility]. Tameka does water aerobics three times a week. She denies any chest pain but will get winded with exercise. This is not progressing or changing. She reports a long history of brief atypical chest pain at rest but not with activity, this is not progressing or changing. She denies any dizziness, syncope, pnd/orhtopnea, edema, or early satiety. Tameka reports a healthy diet and minimal alcohol intake.     Recent Echocardiogram Results: 2/2021  Possible advanced AV block.  Left ventricular systolic function is normal. The visual ejection fraction is estimated at 60-65%. The left ventricle is normal in size.  The right ventricle is normal in structure, function and size.  There is trace to mild mitral regurgitation.     Recent Device Results:  Device: [employer] Accolade  Pacing%/Programmed: AP 6%,  98%, in DDD  ppm mode.  Lead(s): stable  Battery longevity: estimated 11.6  yrs.  Presenting: atrial synchronous ventricular pacing, rate 68 bpm.  Atrial/Ventricular arrhythmias: since 8/23/22, none detected.  Device/Lead alerts: device on advisory, risk for hydrogen induced battery depletion per [employer].  Comments: normal magnet and pacemaker function.       Physical Examination  Review of Systems   /78, hR 77, wt 82 kg  Wt Readings from Last 3 Encounters:   08/05/22 80.3 kg (177 lb)   08/02/21 81.2 kg (179 lb)   02/09/21 77.6 kg (171 lb)       General Appearance:   no distress, overweight body habitus   ENT/Mouth: membranes moist, no oral lesions or bleeding gums.      EYES:  no scleral icterus, normal conjunctivae   Neck: no carotid bruits or thyromegaly   Chest/Lungs:   lungs are clear to auscultation, no rales or wheezing, no sternal scar, equal chest wall expansion    Cardiovascular:   Regular. Normal first and second heart sounds with early systolic murmur. No rubs or gallops; the right carotid, radial and posterior tibial pulses are intact and the left carotid, radial and posterior tibial pulses are intact.  Jugular venous pressure is flat, no edema bilaterally    Abdomen:  no organomegaly, masses, bruits, or tenderness; bowel sounds are present   Extremities: no cyanosis or clubbing   Skin: no xanthelasma, warm.    Neurologic: normal  bilateral, no tremors     Psychiatric: alert and oriented x3, calm        Please refer above for cardiac ROS details.        Medical History  Surgical History Family History Social History   Past Medical History:   Diagnosis Date     Chronic kidney disease      Hyperlipidemia      Hypertension      Thyroid disease      Past Surgical History:   Procedure Laterality Date     EP PACEMAKER Left 02/08/2021    Procedure: EP Pacemaker;  Surgeon: Steven Lozano MD;  Location: [facility] CARDIAC CATH LAB     IMPLANT PACEMAKER       History reviewed. No pertinent family history.     Social History     Socioeconomic History     Marital status:       Spouse name: Not on file     Number of children: Not on file     Years of education: Not on file     Highest education level: Not on file   Occupational History     Not on file   Tobacco Use     Smoking status: Never     Smokeless tobacco: Never   Substance and Sexual Activity     Alcohol use: Not on file     Drug use: Not on file     Sexual activity: Not on file   Other Topics Concern     Not on file   Social History Narrative     Not on file     Social Determinants of Health     Financial Resource Strain: Not on file   Food Insecurity: Not on file   Transportation Needs: Not on file   Physical Activity: Not on file   Stress: Not on file   Social Connections: Not on file   Intimate Partner Violence: Not on file   Housing Stability: Not on file           Medications  Allergies   Current Outpatient Medications   Medication Sig Dispense Refill     atorvastatin (LIPITOR) 40 MG tablet Take 1 tablet (40 mg) by mouth daily 90 tablet 4     hydrALAZINE (APRESOLINE) 50 MG tablet Take 1 tablet (50 mg) by mouth 2 times daily 60 tablet 0     hydrochlorothiazide (MICROZIDE) 12.5 MG capsule Take 2 capsules (25 mg) by mouth daily 60 capsule 0     levothyroxine (SYNTHROID/LEVOTHROID) 50 MCG tablet Take 50 mcg by mouth daily       losartan (COZAAR) 50 MG tablet Take 1 tablet (50 mg) by mouth 2 times daily 60 tablet 0     omeprazole (PRILOSEC) 40 MG DR capsule Take 40 mg by mouth daily       vitamin D3 (CHOLECALCIFEROL) 50 mcg (2000 units) tablet Take 1 tablet by mouth daily       No Known Allergies       Lab Results    Chemistry/lipid CBC Cardiac Enzymes/BNP/TSH/INR   Recent Labs   Lab Test 02/08/23  1145   CHOL 129   HDL 49*   LDL 60   TRIG 100     Recent Labs   Lab Test 02/08/23  1145 08/18/22  1004 02/14/22  1553   LDL 60 101 175*     Recent Labs   Lab Test 11/01/21  1222      POTASSIUM 4.2   CHLORIDE 107   CO2 22   GLC 86   BUN 19   CR 1.20*   GFRESTIMATED 47*   BISI 9.7     Recent Labs   Lab Test 11/01/21  1222  02/09/21  0533 02/08/21  0601   CR 1.20* 1.04 1.13*     No results for input(s): A1C in the last 66827 hours.       Recent Labs   Lab Test 11/01/21  1222   WBC 7.4   HGB 14.1   HCT 43.4   MCV 93        Recent Labs   Lab Test 11/01/21  1222 02/09/21  1117 02/08/21  0601   HGB 14.1 13.5 13.4    Recent Labs   Lab Test 02/05/21  1653   TROPONINI <0.01     No results for input(s): BNP, NTBNPI, NTBNP in the last 30866 hours.  Recent Labs   Lab Test 02/08/23  1145   TSH 12.30*     Recent Labs   Lab Test 02/07/21  1923   INR 1.04        Today's clinic visit entailed:  Review of prior external note(s) [facility] - [facility] information from epic reviewed  40 minutes spent on the date of the encounter doing chart review, review of outside records, review of test results, interpretation of tests, patient visit and documentation   Provider  Link to [facility] Help Grid     The level of medical decision making during this visit was of moderate complexity.        Heena Duarte MD                    Thank you for allowing me to participate in the care of your patient.      Sincerely,     Heena Duarte MD     [facility]  cc:   No referring provider defined for this encounter.

## 2023-02-21 NOTE — PROGRESS NOTES
HEART CARE ENCOUNTER CONSULTATON NOTE      [facility]  [phone]      Assessment/Recommendations   Assessment/Plan:  Second degree heart block s/p dual chamber pacer - device functioning well and she feels well.  She is pacing her RV 98% and we discussed that over time this may result in heart failure. We discussed symptoms to watch for.    Hyperlipidemia - LDL at goal on statin. Diet and exercise discussed.    Hypertension - controlled with current meds, follows with nephrology    Mild MR - murmur heard    F/U 12 months       History of Present Illness/Subjective    HPI: Tameka Avalos is a 67 year old female with hypertension, hyperlipidemia, renal insufficiency, hypothyroidism, and type 2 heart block s/p dual chamber pacemaker 2/2021. She comes to establish care today after Dr. Lopez's care home. Tameka does water aerobics three times a week. She denies any chest pain but will get winded with exercise. This is not progressing or changing. She reports a long history of brief atypical chest pain at rest but not with activity, this is not progressing or changing. She denies any dizziness, syncope, pnd/orhtopnea, edema, or early satiety. Tameka reports a healthy diet and minimal alcohol intake.     Recent Echocardiogram Results: 2/2021  Possible advanced AV block.  Left ventricular systolic function is normal. The visual ejection fraction is estimated at 60-65%. The left ventricle is normal in size.  The right ventricle is normal in structure, function and size.  There is trace to mild mitral regurgitation.     Recent Device Results:  Device: [employer] Accolade  Pacing%/Programmed: AP 6%,  98%, in DDD  ppm mode.  Lead(s): stable  Battery longevity: estimated 11.6 yrs.  Presenting: atrial synchronous ventricular pacing, rate 68 bpm.  Atrial/Ventricular arrhythmias: since 8/23/22, none detected.  Device/Lead alerts: device on advisory, risk for hydrogen induced battery depletion per  [employer].  Comments: normal magnet and pacemaker function.       Physical Examination  Review of Systems   /78, hR 77, wt 82 kg  Wt Readings from Last 3 Encounters:   08/05/22 80.3 kg (177 lb)   08/02/21 81.2 kg (179 lb)   02/09/21 77.6 kg (171 lb)       General Appearance:   no distress, overweight body habitus   ENT/Mouth: membranes moist, no oral lesions or bleeding gums.      EYES:  no scleral icterus, normal conjunctivae   Neck: no carotid bruits or thyromegaly   Chest/Lungs:   lungs are clear to auscultation, no rales or wheezing, no sternal scar, equal chest wall expansion    Cardiovascular:   Regular. Normal first and second heart sounds with early systolic murmur. No rubs or gallops; the right carotid, radial and posterior tibial pulses are intact and the left carotid, radial and posterior tibial pulses are intact.  Jugular venous pressure is flat, no edema bilaterally    Abdomen:  no organomegaly, masses, bruits, or tenderness; bowel sounds are present   Extremities: no cyanosis or clubbing   Skin: no xanthelasma, warm.    Neurologic: normal  bilateral, no tremors     Psychiatric: alert and oriented x3, calm        Please refer above for cardiac ROS details.        Medical History  Surgical History Family History Social History   Past Medical History:   Diagnosis Date     Chronic kidney disease      Hyperlipidemia      Hypertension      Thyroid disease      Past Surgical History:   Procedure Laterality Date     EP PACEMAKER Left 02/08/2021    Procedure: EP Pacemaker;  Surgeon: Steven Lozano MD;  Location:  HEART CARDIAC CATH LAB     IMPLANT PACEMAKER       History reviewed. No pertinent family history.     Social History     Socioeconomic History     Marital status:      Spouse name: Not on file     Number of children: Not on file     Years of education: Not on file     Highest education level: Not on file   Occupational History     Not on file   Tobacco Use     Smoking status: Never      Smokeless tobacco: Never   Substance and Sexual Activity     Alcohol use: Not on file     Drug use: Not on file     Sexual activity: Not on file   Other Topics Concern     Not on file   Social History Narrative     Not on file     Social Determinants of Health     Financial Resource Strain: Not on file   Food Insecurity: Not on file   Transportation Needs: Not on file   Physical Activity: Not on file   Stress: Not on file   Social Connections: Not on file   Intimate Partner Violence: Not on file   Housing Stability: Not on file           Medications  Allergies   Current Outpatient Medications   Medication Sig Dispense Refill     atorvastatin (LIPITOR) 40 MG tablet Take 1 tablet (40 mg) by mouth daily 90 tablet 4     hydrALAZINE (APRESOLINE) 50 MG tablet Take 1 tablet (50 mg) by mouth 2 times daily 60 tablet 0     hydrochlorothiazide (MICROZIDE) 12.5 MG capsule Take 2 capsules (25 mg) by mouth daily 60 capsule 0     levothyroxine (SYNTHROID/LEVOTHROID) 50 MCG tablet Take 50 mcg by mouth daily       losartan (COZAAR) 50 MG tablet Take 1 tablet (50 mg) by mouth 2 times daily 60 tablet 0     omeprazole (PRILOSEC) 40 MG DR capsule Take 40 mg by mouth daily       vitamin D3 (CHOLECALCIFEROL) 50 mcg (2000 units) tablet Take 1 tablet by mouth daily       No Known Allergies       Lab Results    Chemistry/lipid CBC Cardiac Enzymes/BNP/TSH/INR   Recent Labs   Lab Test 02/08/23  1145   CHOL 129   HDL 49*   LDL 60   TRIG 100     Recent Labs   Lab Test 02/08/23  1145 08/18/22  1004 02/14/22  1553   LDL 60 101 175*     Recent Labs   Lab Test 11/01/21  1222      POTASSIUM 4.2   CHLORIDE 107   CO2 22   GLC 86   BUN 19   CR 1.20*   GFRESTIMATED 47*   BISI 9.7     Recent Labs   Lab Test 11/01/21  1222 02/09/21  0533 02/08/21  0601   CR 1.20* 1.04 1.13*     No results for input(s): A1C in the last 64920 hours.       Recent Labs   Lab Test 11/01/21  1222   WBC 7.4   HGB 14.1   HCT 43.4   MCV 93        Recent Labs   Lab  Test 11/01/21  1222 02/09/21  1117 02/08/21  0601   HGB 14.1 13.5 13.4    Recent Labs   Lab Test 02/05/21  1653   TROPONINI <0.01     No results for input(s): BNP, NTBNPI, NTBNP in the last 35175 hours.  Recent Labs   Lab Test 02/08/23  1145   TSH 12.30*     Recent Labs   Lab Test 02/07/21  1923   INR 1.04        Today's clinic visit entailed:  Review of prior external note(s) from - [facility] information from epic reviewed  40 minutes spent on the date of the encounter doing chart review, review of outside records, review of test results, interpretation of tests, patient visit and documentation   Provider  Link to [facility] Help Grid     The level of medical decision making during this visit was of moderate complexity.        Heena Duarte MD

## 2023-02-21 NOTE — PATIENT INSTRUCTIONS
It was a pleasure seeing you at [facility] today.        Here are my suggestions for your care:    1. Walk for 30 minutes on your non-exercise days    2. Call with any worsening shortness of breath or swelling       Let's meet again in 12 months.    You can always call my nurse Amanda Baeza RN who is a nurse helping me in the care of my patients. She can be reached at [phone] if you have any questions.    For scheduling, please call my  Nikki Lemus at [phone].    Thank you again for trusting me with your care. Please feel free to call my office at any time if you have any question or if I can assist you in any way.    Heena Duarte MD  [facility]

## 2023-03-11 ENCOUNTER — ANCILLARY PROCEDURE (OUTPATIENT)
Dept: CARDIOLOGY | Facility: CLINIC | Age: 68
End: 2023-03-11
Attending: INTERNAL MEDICINE
Payer: COMMERCIAL

## 2023-03-11 DIAGNOSIS — I49.5 SICK SINUS SYNDROME (H): ICD-10-CM

## 2023-03-11 DIAGNOSIS — Z95.0 CARDIAC PACEMAKER IN SITU: ICD-10-CM

## 2023-03-13 LAB
MDC_IDC_EPISODE_DTM: NORMAL
MDC_IDC_EPISODE_DURATION: 1 S
MDC_IDC_EPISODE_DURATION: 13 S
MDC_IDC_EPISODE_DURATION: 15 S
MDC_IDC_EPISODE_DURATION: 2 S
MDC_IDC_EPISODE_DURATION: 3 S
MDC_IDC_EPISODE_DURATION: 30 S
MDC_IDC_EPISODE_DURATION: 4 S
MDC_IDC_EPISODE_ID: NORMAL
MDC_IDC_EPISODE_TYPE: NORMAL
MDC_IDC_LEAD_IMPLANT_DT: NORMAL
MDC_IDC_LEAD_IMPLANT_DT: NORMAL
MDC_IDC_LEAD_LOCATION: NORMAL
MDC_IDC_LEAD_LOCATION: NORMAL
MDC_IDC_LEAD_LOCATION_DETAIL_1: NORMAL
MDC_IDC_LEAD_LOCATION_DETAIL_1: NORMAL
MDC_IDC_LEAD_MFG: NORMAL
MDC_IDC_LEAD_MFG: NORMAL
MDC_IDC_LEAD_MODEL: NORMAL
MDC_IDC_LEAD_MODEL: NORMAL
MDC_IDC_LEAD_POLARITY_TYPE: NORMAL
MDC_IDC_LEAD_POLARITY_TYPE: NORMAL
MDC_IDC_LEAD_SERIAL: NORMAL
MDC_IDC_LEAD_SERIAL: NORMAL
MDC_IDC_MSMT_BATTERY_DTM: NORMAL
MDC_IDC_MSMT_BATTERY_REMAINING_LONGEVITY: 138 MO
MDC_IDC_MSMT_BATTERY_REMAINING_PERCENTAGE: 100 %
MDC_IDC_MSMT_BATTERY_STATUS: NORMAL
MDC_IDC_MSMT_LEADCHNL_RA_IMPEDANCE_VALUE: 497 OHM
MDC_IDC_MSMT_LEADCHNL_RA_PACING_THRESHOLD_AMPLITUDE: 0.6 V
MDC_IDC_MSMT_LEADCHNL_RA_PACING_THRESHOLD_PULSEWIDTH: 0.4 MS
MDC_IDC_MSMT_LEADCHNL_RV_IMPEDANCE_VALUE: 589 OHM
MDC_IDC_MSMT_LEADCHNL_RV_PACING_THRESHOLD_AMPLITUDE: 1.4 V
MDC_IDC_MSMT_LEADCHNL_RV_PACING_THRESHOLD_PULSEWIDTH: 0.4 MS
MDC_IDC_PG_IMPLANT_DTM: NORMAL
MDC_IDC_PG_MFG: NORMAL
MDC_IDC_PG_MODEL: NORMAL
MDC_IDC_PG_SERIAL: NORMAL
MDC_IDC_PG_TYPE: NORMAL
MDC_IDC_SESS_CLINIC_NAME: NORMAL
MDC_IDC_SESS_DTM: NORMAL
MDC_IDC_SESS_TYPE: NORMAL
MDC_IDC_SET_BRADY_AT_MODE_SWITCH_MODE: NORMAL
MDC_IDC_SET_BRADY_AT_MODE_SWITCH_RATE: 170 {BEATS}/MIN
MDC_IDC_SET_BRADY_LOWRATE: 60 {BEATS}/MIN
MDC_IDC_SET_BRADY_MAX_SENSOR_RATE: 130 {BEATS}/MIN
MDC_IDC_SET_BRADY_MAX_TRACKING_RATE: 130 {BEATS}/MIN
MDC_IDC_SET_BRADY_MODE: NORMAL
MDC_IDC_SET_BRADY_PAV_DELAY_HIGH: 150 MS
MDC_IDC_SET_BRADY_PAV_DELAY_LOW: 200 MS
MDC_IDC_SET_BRADY_SAV_DELAY_HIGH: 150 MS
MDC_IDC_SET_BRADY_SAV_DELAY_LOW: 200 MS
MDC_IDC_SET_LEADCHNL_RA_PACING_AMPLITUDE: 2 V
MDC_IDC_SET_LEADCHNL_RA_PACING_CAPTURE_MODE: NORMAL
MDC_IDC_SET_LEADCHNL_RA_PACING_POLARITY: NORMAL
MDC_IDC_SET_LEADCHNL_RA_PACING_PULSEWIDTH: 0.4 MS
MDC_IDC_SET_LEADCHNL_RA_SENSING_ADAPTATION_MODE: NORMAL
MDC_IDC_SET_LEADCHNL_RA_SENSING_POLARITY: NORMAL
MDC_IDC_SET_LEADCHNL_RA_SENSING_SENSITIVITY: 0.25 MV
MDC_IDC_SET_LEADCHNL_RV_PACING_AMPLITUDE: 1.8 V
MDC_IDC_SET_LEADCHNL_RV_PACING_CAPTURE_MODE: NORMAL
MDC_IDC_SET_LEADCHNL_RV_PACING_POLARITY: NORMAL
MDC_IDC_SET_LEADCHNL_RV_PACING_PULSEWIDTH: 0.4 MS
MDC_IDC_SET_LEADCHNL_RV_SENSING_ADAPTATION_MODE: NORMAL
MDC_IDC_SET_LEADCHNL_RV_SENSING_POLARITY: NORMAL
MDC_IDC_SET_LEADCHNL_RV_SENSING_SENSITIVITY: 1.5 MV
MDC_IDC_SET_ZONE_DETECTION_INTERVAL: 375 MS
MDC_IDC_SET_ZONE_TYPE: NORMAL
MDC_IDC_SET_ZONE_VENDOR_TYPE: NORMAL
MDC_IDC_STAT_AT_BURDEN_PERCENT: 1 %
MDC_IDC_STAT_AT_DTM_END: NORMAL
MDC_IDC_STAT_AT_DTM_START: NORMAL
MDC_IDC_STAT_BRADY_DTM_END: NORMAL
MDC_IDC_STAT_BRADY_DTM_START: NORMAL
MDC_IDC_STAT_BRADY_RA_PERCENT_PACED: 6 %
MDC_IDC_STAT_BRADY_RV_PERCENT_PACED: 99 %
MDC_IDC_STAT_EPISODE_RECENT_COUNT: 0
MDC_IDC_STAT_EPISODE_RECENT_COUNT: 9
MDC_IDC_STAT_EPISODE_RECENT_COUNT_DTM_END: NORMAL
MDC_IDC_STAT_EPISODE_RECENT_COUNT_DTM_START: NORMAL
MDC_IDC_STAT_EPISODE_TYPE: NORMAL
MDC_IDC_STAT_EPISODE_VENDOR_TYPE: NORMAL

## 2023-03-13 PROCEDURE — 93296 REM INTERROG EVL PM/IDS: CPT | Performed by: INTERNAL MEDICINE

## 2023-03-13 PROCEDURE — 93294 REM INTERROG EVL PM/LDLS PM: CPT | Performed by: INTERNAL MEDICINE

## 2023-04-10 ENCOUNTER — LAB REQUISITION (OUTPATIENT)
Dept: LAB | Facility: CLINIC | Age: 68
End: 2023-04-10

## 2023-04-10 DIAGNOSIS — E03.9 HYPOTHYROIDISM, UNSPECIFIED: ICD-10-CM

## 2023-04-10 LAB — TSH SERPL DL<=0.005 MIU/L-ACNC: 0.33 UIU/ML (ref 0.3–4.2)

## 2023-04-10 PROCEDURE — 84443 ASSAY THYROID STIM HORMONE: CPT | Performed by: FAMILY MEDICINE

## 2023-04-14 ENCOUNTER — HOSPITAL ENCOUNTER (EMERGENCY)
Facility: HOSPITAL | Age: 68
Discharge: HOME | End: 2023-04-14
Attending: FAMILY MEDICINE | Admitting: FAMILY MEDICINE
Payer: COMMERCIAL

## 2023-04-14 ENCOUNTER — APPOINTMENT (OUTPATIENT)
Dept: RADIOLOGY | Facility: HOSPITAL | Age: 68
End: 2023-04-14
Attending: PHYSICIAN ASSISTANT
Payer: COMMERCIAL

## 2023-04-14 VITALS
HEART RATE: 80 BPM | RESPIRATION RATE: 19 BRPM | WEIGHT: 178 LBS | OXYGEN SATURATION: 97 % | TEMPERATURE: 99 F | SYSTOLIC BLOOD PRESSURE: 166 MMHG | HEIGHT: 62 IN | BODY MASS INDEX: 32.76 KG/M2 | DIASTOLIC BLOOD PRESSURE: 74 MMHG

## 2023-04-14 DIAGNOSIS — S43.004A SHOULDER DISLOCATION, RIGHT, INITIAL ENCOUNTER: ICD-10-CM

## 2023-04-14 PROCEDURE — 96374 THER/PROPH/DIAG INJ IV PUSH: CPT

## 2023-04-14 PROCEDURE — 73030 X-RAY EXAM OF SHOULDER: CPT | Mod: RT

## 2023-04-14 PROCEDURE — 99285 EMERGENCY DEPT VISIT HI MDM: CPT | Mod: 25

## 2023-04-14 PROCEDURE — 250N000013 HC RX MED GY IP 250 OP 250 PS 637: Performed by: PHYSICIAN ASSISTANT

## 2023-04-14 PROCEDURE — 999N000065 XR SHOULDER RIGHT PORT 1 VIEW

## 2023-04-14 PROCEDURE — 23650 CLTX SHO DSLC W/MNPJ WO ANES: CPT | Mod: RT

## 2023-04-14 PROCEDURE — 250N000011 HC RX IP 250 OP 636: Performed by: PHYSICIAN ASSISTANT

## 2023-04-14 RX ORDER — FENTANYL CITRATE 50 UG/ML
80 INJECTION, SOLUTION INTRAMUSCULAR; INTRAVENOUS ONCE
Status: COMPLETED | OUTPATIENT
Start: 2023-04-14 | End: 2023-04-14

## 2023-04-14 RX ORDER — ACETAMINOPHEN 325 MG/1
650 TABLET ORAL ONCE
Status: COMPLETED | OUTPATIENT
Start: 2023-04-14 | End: 2023-04-14

## 2023-04-14 RX ADMIN — ACETAMINOPHEN 650 MG: 325 TABLET ORAL at 20:00

## 2023-04-14 RX ADMIN — FENTANYL CITRATE 80 MCG: 50 INJECTION, SOLUTION INTRAMUSCULAR; INTRAVENOUS at 22:17

## 2023-04-14 ASSESSMENT — ENCOUNTER SYMPTOMS
NAUSEA: 0
ARTHRALGIAS: 1
WOUND: 0
HEADACHES: 0
BRUISES/BLEEDS EASILY: 0
VOMITING: 0

## 2023-04-14 ASSESSMENT — ACTIVITIES OF DAILY LIVING (ADL): ADLS_ACUITY_SCORE: 35

## 2023-04-15 NOTE — DISCHARGE INSTRUCTIONS
Wear the immobilizer for the next few weeks or until instructed by orthopedics. Please call to schedule follow up within 1 week.   Tylenol and ibuprofen as needed for pain.

## 2023-04-15 NOTE — ED PROVIDER NOTES
EMERGENCY DEPARTMENT NOTE     Name: Tameka Avalos    Age/Sex: 67 year old female   MRN: [id number]   Evaluation Date & Time:  4/14/2023  9:52 PM    PCP:    Eve Brewer   ED Provider: Yanick Pandya D.O.       CHIEF COMPLAINT    Fall and Shoulder Injury       DIAGNOSIS & DISPOSITION     1. Shoulder dislocation, right, initial encounter      DISPOSITION: Home    At the conclusion of the encounter I discussed the results of all of the tests and the disposition. The questions were answered. The patient or family acknowledged understanding and was agreeable with the care plan.    TOTAL CRITICAL CARE TIME (EXCLUDING PROCEDURES): Not applicable    PROCEDURES:     PROCEDURE: Reduction   INDICATIONS:  Right anterior shoulder dislocation   PROCEDURE PROVIDER: Dr Yanick Pandya   CONSENT: Risks, benefits and alternatives were discussed with and Verbal consent was obtained from Patient.   MEDICATIONS: IV Fentanyl   REDUCTION PROCEDURE DESCRIPTION:  Using Cunningham method with scapular mobility ablation right shoulder dislocation was successfully reduced.  Distal CSM was intact   COMPLICATIONS:  Patient tolerated procedure well, without complication       EMERGENCY DEPARTMENT COURSE/MEDICAL DECISION MAKING   10:00 PM I met with the patient to gather history and to perform my initial exam.  We discussed treatment options and the plan for care while in the Emergency Department.    Tameka Avalos is a 67 year old female with relevant past history of HTN, bradycardia, cardiac pacemaker, HLD, CKD, who presents to this ED for evaluation of shoulder injury.          Triage note reviewed:Tripped over rug at home about an hour ago and fell onto knee catching self with right arm. Unable to lift right arm. Constant pain. Denies numbness/tingling.        Diagnostic studies:  Imaging:  Shoulder XR, right, one vw PORTABLE   Final Result   IMPRESSION: Satisfactory glenohumeral alignment post reduction single view.      XR Shoulder  "Right 2 Views   Final Result   IMPRESSION:    1.  Anteroinferior dislocation of the right glenohumeral joint.   2.  No fracture is evident. Further evaluation and postreduction imaging recommended.   3.  Unremarkable acromioclavicular joint.   4.  Cardiac pacer leads noted.          Lab:  Labs Ordered and Resulted from Time of ED Arrival to Time of ED Departure - No data to display   Interventions:  Shoulder reduction as per procedure note      Discharge Vital Signs:BP (!) 166/74   Pulse 80   Temp 99  F (37.2  C) (Oral)   Resp 19   Ht 1.575 m (5' 2\")   Wt 80.7 kg (178 lb)   SpO2 97%   BMI 32.56 kg/m      Medical Decision Making  Patient seen in conjunction with the physician assistant Bonnie wyman.  Shoulder reduced as per procedure note patient tolerated procedure well.  Patient will be discharged with orthopedic follow-up    History:    Supplemental history from: Documented in chart, if applicable    External Record(s) reviewed: Documented in chart, if applicable.    Work Up:    Chart documentation includes differential considered and any EKGs or imaging independently interpreted by provider, where specified.    In additional to work up documented, I considered the following work up: Documented in chart, if applicable.    External consultation:    Discussion of management with another provider: Documented in chart, if applicable    Complicating factors:    Care impacted by chronic illness: N/A    Care affected by social determinants of health: N/A    Disposition considerations: Discharge. No recommendations on prescription strength medication(s). N/A.      @@@    ED INTERVENTIONS     Medications   acetaminophen (TYLENOL) tablet 650 mg (650 mg Oral $Given 4/14/23 2000)   fentaNYL (PF) (SUBLIMAZE) injection 80 mcg (80 mcg Intravenous $Given 4/14/23 2217)       DISCHARGE MEDICATIONS        Review of your medicines      UNREVIEWED medicines. Ask your doctor about these medicines      Dose / Directions "   atorvastatin 40 MG tablet  Commonly known as: LIPITOR  Used for: Hyperlipidemia LDL goal <100      Dose: 40 mg  Take 1 tablet (40 mg) by mouth daily  Quantity: 90 tablet  Refills: 4     hydrALAZINE 50 MG tablet  Commonly known as: APRESOLINE  Used for: Hypertension, unspecified type      Dose: 50 mg  Take 1 tablet (50 mg) by mouth 2 times daily  Quantity: 60 tablet  Refills: 0     hydrochlorothiazide 12.5 MG capsule  Commonly known as: MICROZIDE  Used for: Hypertension, unspecified type      Dose: 25 mg  Take 2 capsules (25 mg) by mouth daily  Quantity: 60 capsule  Refills: 0     levothyroxine 50 MCG tablet  Commonly known as: SYNTHROID/LEVOTHROID      Dose: 75 mcg  Take 75 mcg by mouth daily  Refills: 0     losartan 50 MG tablet  Commonly known as: COZAAR  Used for: Hypertension, unspecified type      Dose: 50 mg  Take 1 tablet (50 mg) by mouth 2 times daily  Quantity: 60 tablet  Refills: 0     vitamin D3 50 mcg (2000 units) tablet  Commonly known as: CHOLECALCIFEROL      Dose: 1 tablet  Take 1 tablet by mouth daily  Refills: 0              INFORMATION SOURCE AND LIMITATIONS    History/Exam limitations: none  Patient information was obtained from: patient   Use of : N/A    HISTORY OF PRESENT ILLNESS   Tameka Avalos is a 67 year old year old female with a relevant past history of HTN, bradycardia, cardiac pacemaker, HLD, CKD, who presents to this ED for evaluation of shoulder injury. The patient tripped this evening injuring her right shoulder. No head injuries or loss of consciousness. She complains of right shoulder pain and is unable to lift her arm due to pain. No pain in her elbow, forearm, or wrist. No associated numbness or weakness.     REVIEW OF SYSTEMS:   Review of systems negative for other acute illness    PATIENT HISTORY     Past Medical History:   Diagnosis Date     Chronic kidney disease      Hyperlipidemia      Hypertension      Thyroid disease      Patient Active Problem List  "  Diagnosis     Bradycardia     HTN (hypertension)     Cardiac pacemaker in situ     Hyperlipidemia LDL goal <100     Past Surgical History:   Procedure Laterality Date     EP PACEMAKER Left 02/08/2021    Procedure: EP Pacemaker;  Surgeon: Steven Lozano MD;  Location:  [facility]     IMPLANT PACEMAKER         No Known Allergies    OUTPATIENT MEDICATIONS     Discharge Medication List as of 4/14/2023 11:34 PM         Vitals:    04/14/23 1935 04/14/23 2201 04/14/23 2300   BP: (!) 168/91 (!) 185/99 (!) 166/74   Pulse: 80 82 80   Resp: 20 17 19   Temp: 99  F (37.2  C)     TempSrc: Oral     SpO2: 98% 97%    Weight: 80.7 kg (178 lb)     Height: 1.575 m (5' 2\")         Physical Exam   Constitutional: Oriented to person, place, and time. Appears well-developed and well-nourished.     Musculoskeletal: Right shoulder with tenderness with defect consistent with anterior shoulder dislocation.  Right elbow right wrist nontender.  Distal CSM intact  Neurological: Alert and oriented to person, place, and time. Normal strength. No sensory deficit.   Skin: Skin is warm and dry.   Psychiatric: Normal mood and affect. Behavior is normal. Thought content normal.     DIAGNOSTICS    LABORATORY FINDINGS (REVIEWED AND INTERPRETED):  Labs Ordered and Resulted from Time of ED Arrival to Time of ED Departure - No data to display      IMAGING (REVIEWED AND INTERPRETED):  Shoulder XR, right, one vw PORTABLE   Final Result   IMPRESSION: Satisfactory glenohumeral alignment post reduction single view.      XR Shoulder Right 2 Views   Final Result   IMPRESSION:    1.  Anteroinferior dislocation of the right glenohumeral joint.   2.  No fracture is evident. Further evaluation and postreduction imaging recommended.   3.  Unremarkable acromioclavicular joint.   4.  Cardiac pacer leads noted.             ECG (REVIEWED AND INTERPRETED):   None        Zaira LAU am serving as a scribe to document services personally performed " by Yanick Pandya D.O., based on my observation and the provider s statements to me.    I, Yanick Pandya D.O., attest that Zaira Leary is acting in a scribe capacity, has observed my performance of the services and has documented them in accordance with my direction.    Yanick Pandya D.O.  EMERGENCY MEDICINE   04/14/23  [facility] EMERGENCY DEPARTMENT  [address] [place] [id number]  [phone]  Dept: [phone]       Yanick Pandya,   04/15/23 0325

## 2023-04-15 NOTE — ED PROVIDER NOTES
EMERGENCY DEPARTMENT ENCOUNTER      NAME: Tameka Avalos  AGE: 67 year old female  YOB: 1955  MRN: [id number]  EVALUATION DATE & TIME: No admission date for patient encounter.    PCP: Eve Brewer    ED PROVIDER: Bonnie Virgen PA-C      Chief Complaint   Patient presents with     Fall     Shoulder Injury         FINAL IMPRESSION:  1. Shoulder dislocation, right, initial encounter          MEDICAL DECISION MAKING:    Pertinent Labs & Imaging studies reviewed. (See chart for details)  67 year old female presents to the Emergency Department for evaluation of right shoulder pain after tripping on rug and landing on right arm. Denies hitting head or loss of consciousness.     Vitals reviewed and notable for elevated blood pressure, likely secondary to pain. On exam, no evidence of head trauma. Cervical spine cleared. Point tenderness over the right lateral shoulder into the proximal humerus. Asymmetric appearance when compared to left.  She is unable to perform range of motion secondary to pain.  She has no tenderness with palpation to the scapula, clavicle, elbow, forearm, wrist or hand.  5 out of 5  strength.  Brisk capillary refill.  Normal sensation.  Compartments are soft.  Overlying skin is intact. Differential diagnosis includes but not limited to fracture, dislocation, ligamentous injury.    Independently reviewed xrays. Shoulder anteriorly dislocated. No obvious fracture. We were able to reduce her shoulder without conscious sedation using IV fentanyl for pain control. Post reduction films demonstrate successful reduction. No obvious fracture post reduction. She was placed in a shoulder immobilizer with instructions for ortho follow up in the next week. We discussed pain management and return precautions. Patient discharged home in stable condition.     Medical Decision Making    History:    Supplemental history from: Family Member/Significant Other    External Record(s) reviewed:  Documented in chart, if applicable.    Work Up:    Chart documentation includes differential considered and any EKGs or imaging independently interpreted by provider, where specified.    In additional to work up documented, I considered the following work up: Documented in chart, if applicable.    External consultation:    Discussion of management with another provider: Documented in chart, if applicable    Complicating factors:    Care impacted by chronic illness: Heart Disease and Hypertension    Care affected by social determinants of health: N/A    Disposition considerations: Discharge. No recommendations on prescription strength medication(s). N/A.        0 minutes of critical care time     ED COURSE:  7:50 PM I met with the patient, obtained history, performed an initial exam, and discussed options and plan for diagnostics and treatment here in the ED.  10:33 PM Dr. Pandya assisted with shoulder reduction.  11:03 PM Patient discharged after being provided with extensive anticipatory guidance and given return precautions, importance of PCP follow-up emphasized.    At the conclusion of the encounter I discussed the results of all of the tests and the disposition. The questions were answered. The patient or family acknowledged understanding and was agreeable with the care plan.     MEDICATIONS GIVEN IN THE EMERGENCY:  Medications   acetaminophen (TYLENOL) tablet 650 mg (650 mg Oral $Given 4/14/23 2000)   fentaNYL (PF) (SUBLIMAZE) injection 80 mcg (80 mcg Intravenous $Given 4/14/23 2217)       NEW PRESCRIPTIONS STARTED AT TODAY'S ER VISIT  Discharge Medication List as of 4/14/2023 11:34 PM               =================================================================    HPI:    Patient information was obtained from: Patient and     Use of Interpretor: N/A      Tameka Avalos is a 67 year old female with a pertinent history of HTN, bradycardia, cardiac pacemaker, HLD, CKD who presents to this ED via  private vehicle for evaluation of fall.    1 hour ago patient tripped over a rug at home and used her right arm to catch herself.  She denies hitting her head or loss of consciousness.  She has had severe pain to her right upper arm and shoulder ever since she fell.  She is not able to raise her arm secondary to pain.  She denies any pain to her elbow, forearm or wrist.  She has not taken anything for pain.  She denies any numbness or weakness.  Patient is not anticoagulated.  She denies any prior history of surgery in this arm. Patient is right handed.    REVIEW OF SYSTEMS:  Review of Systems   Gastrointestinal: Negative for nausea and vomiting.   Musculoskeletal: Positive for arthralgias (right shoulder).   Skin: Negative for wound.   Neurological: Negative for headaches.   Hematological: Does not bruise/bleed easily.   All other systems reviewed and are negative.      PAST MEDICAL HISTORY:  Past Medical History:   Diagnosis Date     Chronic kidney disease      Hyperlipidemia      Hypertension      Thyroid disease        PAST SURGICAL HISTORY:  Past Surgical History:   Procedure Laterality Date     EP PACEMAKER Left 02/08/2021    Procedure: EP Pacemaker;  Surgeon: Steven Lozano MD;  Location: [facility] CARDIAC CATH LAB     IMPLANT PACEMAKER             CURRENT MEDICATIONS:    No current facility-administered medications for this encounter.    Current Outpatient Medications:      atorvastatin (LIPITOR) 40 MG tablet, Take 1 tablet (40 mg) by mouth daily, Disp: 90 tablet, Rfl: 4     hydrALAZINE (APRESOLINE) 50 MG tablet, Take 1 tablet (50 mg) by mouth 2 times daily (Patient taking differently: Take 50 mg by mouth 2 times daily Taking), Disp: 60 tablet, Rfl: 0     hydrochlorothiazide (MICROZIDE) 12.5 MG capsule, Take 2 capsules (25 mg) by mouth daily (Patient taking differently: Take 25 mg by mouth daily Taking), Disp: 60 capsule, Rfl: 0     levothyroxine (SYNTHROID/LEVOTHROID) 50 MCG tablet, Take 75 mcg by  "mouth daily, Disp: , Rfl:      losartan (COZAAR) 50 MG tablet, Take 1 tablet (50 mg) by mouth 2 times daily, Disp: 60 tablet, Rfl: 0     vitamin D3 (CHOLECALCIFEROL) 50 mcg (2000 units) tablet, Take 1 tablet by mouth daily, Disp: , Rfl:       ALLERGIES:  No Known Allergies    FAMILY HISTORY:  No family history on file.    SOCIAL HISTORY:   Social History     Socioeconomic History     Marital status:    Tobacco Use     Smoking status: Never     Smokeless tobacco: Never       VITALS:  Patient Vitals for the past 24 hrs:   BP Temp Temp src Pulse Resp SpO2 Height Weight   04/14/23 2300 (!) 166/74 -- -- 80 19 -- -- --   04/14/23 2201 (!) 185/99 -- -- 82 17 97 % -- --   04/14/23 1935 (!) 168/91 99  F (37.2  C) Oral 80 20 98 % 1.575 m (5' 2\") 80.7 kg (178 lb)       PHYSICAL EXAM  Constitutional: Well developed, Well nourished, NAD  HENT: Normocephalic, Atraumatic, Bilateral external ears normal, Oropharynx normal, mucous membranes moist, Nose normal.   Neck: Normal range of motion, No midline cervical spine tenderness, no pain with axial loading. Supple, No stridor.  Eyes: PERRL, EOMI, Conjunctiva normal, No discharge.   Respiratory: Normal breath sounds, No respiratory distress, No wheezing, Speaks full sentences easily. No cough.  Cardiovascular: Normal heart rate, Regular rhythm, No murmurs, No rubs, No gallops. Chest wall nontender.  GI: Soft, No tenderness, No masses, No flank tenderness. No rebound or guarding.  Musculoskeletal: 2+ radial pulses. No edema. No cyanosis, No clubbing.  Point tenderness over the right lateral shoulder into the proximal humerus. Asymmetric appearance when compared to left.  She is unable to perform range of motion secondary to pain.  She has no tenderness with palpation to the scapula, clavicle, elbow, forearm, wrist or hand.  5 out of 5  strength.  Brisk capillary refill.  Normal sensation.  Compartments are soft.  Overlying skin is intact.  Integument: Warm, Dry, No erythema, " No rash. No petechiae.  Neurologic: Alert & oriented x 3, Normal motor function, Normal sensory function, No focal deficits noted. Normal gait.  Psychiatric: Affect normal, Judgment normal, Mood normal. Cooperative.    LAB:  All pertinent labs reviewed and interpreted.  No results found for this or any previous visit (from the past 24 hour(s)).      RADIOLOGY:  Reviewed all pertinent imaging. Please see official radiology report.  Shoulder XR, right, one vw PORTABLE   Final Result   IMPRESSION: Satisfactory glenohumeral alignment post reduction single view.      XR Shoulder Right 2 Views   Final Result   IMPRESSION:    1.  Anteroinferior dislocation of the right glenohumeral joint.   2.  No fracture is evident. Further evaluation and postreduction imaging recommended.   3.  Unremarkable acromioclavicular joint.   4.  Cardiac pacer leads noted.             Bonnie Virgen PA-C  Emergency Medicine  [facility]  4/14/2023     Bonnie Virgen PA-C  04/15/23 0969

## 2023-04-15 NOTE — ED NOTES
Discharge    Discharge paperwork discussed. Patient questions answered. AVS in hand. Patient discharged from ED.

## 2023-04-15 NOTE — ED TRIAGE NOTES
Tripped over rug at home about an hour ago and fell onto knee catching self with right arm. Unable to lift right arm. Constant pain. Denies numbness/tingling.

## 2023-04-18 ENCOUNTER — TRANSFERRED RECORDS (OUTPATIENT)
Dept: HEALTH INFORMATION MANAGEMENT | Facility: CLINIC | Age: 68
End: 2023-04-18
Payer: COMMERCIAL

## 2023-05-08 ENCOUNTER — HEALTH MAINTENANCE LETTER (OUTPATIENT)
Age: 68
End: 2023-05-08

## 2023-06-05 ENCOUNTER — HOSPITAL ENCOUNTER (OUTPATIENT)
Facility: HOSPITAL | Age: 68
Setting detail: OBSERVATION
Discharge: HOME | End: 2023-06-06
Attending: EMERGENCY MEDICINE | Admitting: INTERNAL MEDICINE
Payer: COMMERCIAL

## 2023-06-05 ENCOUNTER — APPOINTMENT (OUTPATIENT)
Dept: CT IMAGING | Facility: HOSPITAL | Age: 68
End: 2023-06-05
Attending: EMERGENCY MEDICINE
Payer: COMMERCIAL

## 2023-06-05 ENCOUNTER — ANESTHESIA (OUTPATIENT)
Dept: SURGERY | Facility: HOSPITAL | Age: 68
End: 2023-06-05
Payer: COMMERCIAL

## 2023-06-05 ENCOUNTER — ANESTHESIA EVENT (OUTPATIENT)
Dept: SURGERY | Facility: HOSPITAL | Age: 68
End: 2023-06-05
Payer: COMMERCIAL

## 2023-06-05 DIAGNOSIS — K80.50 BILIARY COLIC: ICD-10-CM

## 2023-06-05 PROBLEM — N18.30 STAGE 3 CHRONIC KIDNEY DISEASE (H): Status: ACTIVE | Noted: 2023-02-07

## 2023-06-05 LAB
ALBUMIN SERPL BCG-MCNC: 3.4 G/DL (ref 3.5–5.2)
ALBUMIN UR-MCNC: NEGATIVE MG/DL
ALP SERPL-CCNC: 88 U/L (ref 35–104)
ALT SERPL W P-5'-P-CCNC: 18 U/L (ref 10–35)
ANION GAP SERPL CALCULATED.3IONS-SCNC: 10 MMOL/L (ref 7–15)
APPEARANCE UR: CLEAR
AST SERPL W P-5'-P-CCNC: 24 U/L (ref 10–35)
BACTERIA #/AREA URNS HPF: ABNORMAL /HPF
BASOPHILS # BLD AUTO: 0.1 10E3/UL (ref 0–0.2)
BASOPHILS NFR BLD AUTO: 1 %
BILIRUB SERPL-MCNC: 0.3 MG/DL
BILIRUB UR QL STRIP: NEGATIVE
BUN SERPL-MCNC: 29 MG/DL (ref 8–23)
CALCIUM SERPL-MCNC: 9 MG/DL (ref 8.8–10.2)
CHLORIDE SERPL-SCNC: 110 MMOL/L (ref 98–107)
COLOR UR AUTO: ABNORMAL
CREAT SERPL-MCNC: 1.28 MG/DL (ref 0.51–0.95)
CREAT SERPL-MCNC: 1.41 MG/DL (ref 0.51–0.95)
CRP SERPL-MCNC: <3 MG/L
DEPRECATED HCO3 PLAS-SCNC: 22 MMOL/L (ref 22–29)
EOSINOPHIL # BLD AUTO: 0.2 10E3/UL (ref 0–0.7)
EOSINOPHIL NFR BLD AUTO: 2 %
ERYTHROCYTE [DISTWIDTH] IN BLOOD BY AUTOMATED COUNT: 16.9 % (ref 10–15)
GFR SERPL CREATININE-BSD FRML MDRD: 40 ML/MIN/1.73M2
GFR SERPL CREATININE-BSD FRML MDRD: 45 ML/MIN/1.73M2
GLUCOSE SERPL-MCNC: 116 MG/DL (ref 70–99)
GLUCOSE UR STRIP-MCNC: NEGATIVE MG/DL
HCT VFR BLD AUTO: 35.3 % (ref 35–47)
HGB BLD-MCNC: 11.3 G/DL (ref 11.7–15.7)
HGB UR QL STRIP: NEGATIVE
HOLD SPECIMEN: NORMAL
HYALINE CASTS: 1 /LPF
IMM GRANULOCYTES # BLD: 0 10E3/UL
IMM GRANULOCYTES NFR BLD: 0 %
KETONES UR STRIP-MCNC: NEGATIVE MG/DL
LEUKOCYTE ESTERASE UR QL STRIP: ABNORMAL
LIPASE SERPL-CCNC: 62 U/L (ref 13–60)
LYMPHOCYTES # BLD AUTO: 2.4 10E3/UL (ref 0.8–5.3)
LYMPHOCYTES NFR BLD AUTO: 24 %
MCH RBC QN AUTO: 26 PG (ref 26.5–33)
MCHC RBC AUTO-ENTMCNC: 32 G/DL (ref 31.5–36.5)
MCV RBC AUTO: 81 FL (ref 78–100)
MONOCYTES # BLD AUTO: 0.8 10E3/UL (ref 0–1.3)
MONOCYTES NFR BLD AUTO: 8 %
MUCOUS THREADS #/AREA URNS LPF: PRESENT /LPF
NEUTROPHILS # BLD AUTO: 6.5 10E3/UL (ref 1.6–8.3)
NEUTROPHILS NFR BLD AUTO: 65 %
NITRATE UR QL: NEGATIVE
NRBC # BLD AUTO: 0 10E3/UL
NRBC BLD AUTO-RTO: 0 /100
PH UR STRIP: 6 [PH] (ref 5–7)
PLATELET # BLD AUTO: 390 10E3/UL (ref 150–450)
POTASSIUM SERPL-SCNC: 3.7 MMOL/L (ref 3.4–5.3)
PROT SERPL-MCNC: 6.3 G/DL (ref 6.4–8.3)
RBC # BLD AUTO: 4.35 10E6/UL (ref 3.8–5.2)
RBC URINE: 2 /HPF
SODIUM SERPL-SCNC: 142 MMOL/L (ref 136–145)
SP GR UR STRIP: 1.02 (ref 1–1.03)
SQUAMOUS EPITHELIAL: 4 /HPF
TRANSITIONAL EPI: 1 /HPF
UROBILINOGEN UR STRIP-MCNC: <2 MG/DL
WBC # BLD AUTO: 9.9 10E3/UL (ref 4–11)
WBC URINE: 25 /HPF

## 2023-06-05 PROCEDURE — 36415 COLL VENOUS BLD VENIPUNCTURE: CPT | Performed by: EMERGENCY MEDICINE

## 2023-06-05 PROCEDURE — 99207 PR NO BILLABLE SERVICE THIS VISIT: CPT | Performed by: INTERNAL MEDICINE

## 2023-06-05 PROCEDURE — 250N000009 HC RX 250: Performed by: SURGERY

## 2023-06-05 PROCEDURE — 81001 URINALYSIS AUTO W/SCOPE: CPT | Performed by: EMERGENCY MEDICINE

## 2023-06-05 PROCEDURE — 250N000011 HC RX IP 250 OP 636: Performed by: ANESTHESIOLOGY

## 2023-06-05 PROCEDURE — 710N000009 HC RECOVERY PHASE 1, LEVEL 1, PER MIN: Performed by: SURGERY

## 2023-06-05 PROCEDURE — 96375 TX/PRO/DX INJ NEW DRUG ADDON: CPT

## 2023-06-05 PROCEDURE — 258N000003 HC RX IP 258 OP 636

## 2023-06-05 PROCEDURE — 250N000013 HC RX MED GY IP 250 OP 250 PS 637: Performed by: EMERGENCY MEDICINE

## 2023-06-05 PROCEDURE — 74176 CT ABD & PELVIS W/O CONTRAST: CPT

## 2023-06-05 PROCEDURE — 80053 COMPREHEN METABOLIC PANEL: CPT | Performed by: EMERGENCY MEDICINE

## 2023-06-05 PROCEDURE — 96361 HYDRATE IV INFUSION ADD-ON: CPT

## 2023-06-05 PROCEDURE — 250N000011 HC RX IP 250 OP 636

## 2023-06-05 PROCEDURE — 36415 COLL VENOUS BLD VENIPUNCTURE: CPT | Performed by: SURGERY

## 2023-06-05 PROCEDURE — 83690 ASSAY OF LIPASE: CPT | Performed by: EMERGENCY MEDICINE

## 2023-06-05 PROCEDURE — 250N000009 HC RX 250

## 2023-06-05 PROCEDURE — 360N000076 HC SURGERY LEVEL 3, PER MIN: Performed by: SURGERY

## 2023-06-05 PROCEDURE — 88304 TISSUE EXAM BY PATHOLOGIST: CPT | Mod: TC | Performed by: SURGERY

## 2023-06-05 PROCEDURE — 250N000011 HC RX IP 250 OP 636: Performed by: EMERGENCY MEDICINE

## 2023-06-05 PROCEDURE — 96375 TX/PRO/DX INJ NEW DRUG ADDON: CPT | Mod: XU

## 2023-06-05 PROCEDURE — 120N000001 HC R&B MED SURG/OB

## 2023-06-05 PROCEDURE — 250N000013 HC RX MED GY IP 250 OP 250 PS 637: Performed by: SURGERY

## 2023-06-05 PROCEDURE — 82565 ASSAY OF CREATININE: CPT | Performed by: SURGERY

## 2023-06-05 PROCEDURE — 99222 1ST HOSP IP/OBS MODERATE 55: CPT | Mod: 57 | Performed by: SURGERY

## 2023-06-05 PROCEDURE — 99222 1ST HOSP IP/OBS MODERATE 55: CPT | Performed by: HOSPITALIST

## 2023-06-05 PROCEDURE — 87086 URINE CULTURE/COLONY COUNT: CPT | Performed by: EMERGENCY MEDICINE

## 2023-06-05 PROCEDURE — 250N000013 HC RX MED GY IP 250 OP 250 PS 637: Performed by: INTERNAL MEDICINE

## 2023-06-05 PROCEDURE — 258N000003 HC RX IP 258 OP 636: Performed by: HOSPITALIST

## 2023-06-05 PROCEDURE — 258N000003 HC RX IP 258 OP 636: Performed by: ANESTHESIOLOGY

## 2023-06-05 PROCEDURE — G0378 HOSPITAL OBSERVATION PER HR: HCPCS

## 2023-06-05 PROCEDURE — 258N000003 HC RX IP 258 OP 636: Performed by: EMERGENCY MEDICINE

## 2023-06-05 PROCEDURE — 370N000017 HC ANESTHESIA TECHNICAL FEE, PER MIN: Performed by: SURGERY

## 2023-06-05 PROCEDURE — 250N000011 HC RX IP 250 OP 636: Performed by: SURGERY

## 2023-06-05 PROCEDURE — 250N000025 HC SEVOFLURANE, PER MIN: Performed by: SURGERY

## 2023-06-05 PROCEDURE — 96374 THER/PROPH/DIAG INJ IV PUSH: CPT | Mod: XU

## 2023-06-05 PROCEDURE — 999N000141 HC STATISTIC PRE-PROCEDURE NURSING ASSESSMENT: Performed by: SURGERY

## 2023-06-05 PROCEDURE — 272N000001 HC OR GENERAL SUPPLY STERILE: Performed by: SURGERY

## 2023-06-05 PROCEDURE — 85025 COMPLETE CBC W/AUTO DIFF WBC: CPT | Performed by: EMERGENCY MEDICINE

## 2023-06-05 PROCEDURE — 99285 EMERGENCY DEPT VISIT HI MDM: CPT | Mod: 25

## 2023-06-05 PROCEDURE — 47562 LAPAROSCOPIC CHOLECYSTECTOMY: CPT | Performed by: SURGERY

## 2023-06-05 PROCEDURE — 86140 C-REACTIVE PROTEIN: CPT | Performed by: EMERGENCY MEDICINE

## 2023-06-05 RX ORDER — ONDANSETRON 4 MG/1
4 TABLET, ORALLY DISINTEGRATING ORAL EVERY 30 MIN PRN
Status: DISCONTINUED | OUTPATIENT
Start: 2023-06-05 | End: 2023-06-05 | Stop reason: HOSPADM

## 2023-06-05 RX ORDER — ONDANSETRON 2 MG/ML
4 INJECTION INTRAMUSCULAR; INTRAVENOUS EVERY 6 HOURS PRN
Status: DISCONTINUED | OUTPATIENT
Start: 2023-06-05 | End: 2023-06-06 | Stop reason: HOSPADM

## 2023-06-05 RX ORDER — LEVOTHYROXINE SODIUM 25 UG/1
75 TABLET ORAL DAILY
Status: DISCONTINUED | OUTPATIENT
Start: 2023-06-06 | End: 2023-06-06 | Stop reason: HOSPADM

## 2023-06-05 RX ORDER — HYDRALAZINE HYDROCHLORIDE 25 MG/1
50 TABLET, FILM COATED ORAL 2 TIMES DAILY
Status: DISCONTINUED | OUTPATIENT
Start: 2023-06-05 | End: 2023-06-06 | Stop reason: HOSPADM

## 2023-06-05 RX ORDER — AMOXICILLIN 250 MG
1 CAPSULE ORAL 2 TIMES DAILY
Status: DISCONTINUED | OUTPATIENT
Start: 2023-06-05 | End: 2023-06-06 | Stop reason: HOSPADM

## 2023-06-05 RX ORDER — SODIUM CHLORIDE 9 MG/ML
INJECTION, SOLUTION INTRAVENOUS CONTINUOUS
Status: DISCONTINUED | OUTPATIENT
Start: 2023-06-05 | End: 2023-06-06 | Stop reason: HOSPADM

## 2023-06-05 RX ORDER — CEFAZOLIN SODIUM/WATER 2 G/20 ML
2 SYRINGE (ML) INTRAVENOUS
Status: COMPLETED | OUTPATIENT
Start: 2023-06-05 | End: 2023-06-05

## 2023-06-05 RX ORDER — VASOPRESSIN IN 0.9 % NACL 2 UNIT/2ML
SYRINGE (ML) INTRAVENOUS PRN
Status: DISCONTINUED | OUTPATIENT
Start: 2023-06-05 | End: 2023-06-05

## 2023-06-05 RX ORDER — ACETAMINOPHEN 325 MG/1
650 TABLET ORAL ONCE
Status: COMPLETED | OUTPATIENT
Start: 2023-06-05 | End: 2023-06-05

## 2023-06-05 RX ORDER — ACETAMINOPHEN 325 MG/1
650 TABLET ORAL EVERY 6 HOURS PRN
Status: DISCONTINUED | OUTPATIENT
Start: 2023-06-05 | End: 2023-06-05

## 2023-06-05 RX ORDER — LIDOCAINE 40 MG/G
CREAM TOPICAL
Status: DISCONTINUED | OUTPATIENT
Start: 2023-06-05 | End: 2023-06-05 | Stop reason: HOSPADM

## 2023-06-05 RX ORDER — LOSARTAN POTASSIUM 50 MG/1
50 TABLET ORAL DAILY
COMMUNITY

## 2023-06-05 RX ORDER — ONDANSETRON 2 MG/ML
4 INJECTION INTRAMUSCULAR; INTRAVENOUS EVERY 6 HOURS PRN
Status: DISCONTINUED | OUTPATIENT
Start: 2023-06-05 | End: 2023-06-05

## 2023-06-05 RX ORDER — FENTANYL CITRATE 50 UG/ML
25 INJECTION, SOLUTION INTRAMUSCULAR; INTRAVENOUS EVERY 5 MIN PRN
Status: DISCONTINUED | OUTPATIENT
Start: 2023-06-05 | End: 2023-06-05 | Stop reason: HOSPADM

## 2023-06-05 RX ORDER — CEFAZOLIN SODIUM/WATER 2 G/20 ML
SYRINGE (ML) INTRAVENOUS
Status: DISCONTINUED
Start: 2023-06-05 | End: 2023-06-05 | Stop reason: HOSPADM

## 2023-06-05 RX ORDER — KETOROLAC TROMETHAMINE 15 MG/ML
15 INJECTION, SOLUTION INTRAMUSCULAR; INTRAVENOUS ONCE
Status: COMPLETED | OUTPATIENT
Start: 2023-06-05 | End: 2023-06-05

## 2023-06-05 RX ORDER — PROCHLORPERAZINE MALEATE 5 MG
5 TABLET ORAL EVERY 6 HOURS PRN
Status: DISCONTINUED | OUTPATIENT
Start: 2023-06-05 | End: 2023-06-06 | Stop reason: HOSPADM

## 2023-06-05 RX ORDER — BUPIVACAINE HYDROCHLORIDE 2.5 MG/ML
INJECTION, SOLUTION INFILTRATION; PERINEURAL PRN
Status: DISCONTINUED | OUTPATIENT
Start: 2023-06-05 | End: 2023-06-05 | Stop reason: HOSPADM

## 2023-06-05 RX ORDER — NALOXONE HYDROCHLORIDE 0.4 MG/ML
0.4 INJECTION, SOLUTION INTRAMUSCULAR; INTRAVENOUS; SUBCUTANEOUS
Status: DISCONTINUED | OUTPATIENT
Start: 2023-06-05 | End: 2023-06-06 | Stop reason: HOSPADM

## 2023-06-05 RX ORDER — NALOXONE HYDROCHLORIDE 0.4 MG/ML
0.2 INJECTION, SOLUTION INTRAMUSCULAR; INTRAVENOUS; SUBCUTANEOUS
Status: DISCONTINUED | OUTPATIENT
Start: 2023-06-05 | End: 2023-06-06 | Stop reason: HOSPADM

## 2023-06-05 RX ORDER — DEXAMETHASONE SODIUM PHOSPHATE 10 MG/ML
INJECTION, SOLUTION INTRAMUSCULAR; INTRAVENOUS PRN
Status: DISCONTINUED | OUTPATIENT
Start: 2023-06-05 | End: 2023-06-05

## 2023-06-05 RX ORDER — ACETAMINOPHEN 325 MG/1
975 TABLET ORAL EVERY 8 HOURS
Status: DISCONTINUED | OUTPATIENT
Start: 2023-06-05 | End: 2023-06-06 | Stop reason: HOSPADM

## 2023-06-05 RX ORDER — MAGNESIUM HYDROXIDE 1200 MG/15ML
LIQUID ORAL PRN
Status: DISCONTINUED | OUTPATIENT
Start: 2023-06-05 | End: 2023-06-05 | Stop reason: HOSPADM

## 2023-06-05 RX ORDER — ONDANSETRON 2 MG/ML
4 INJECTION INTRAMUSCULAR; INTRAVENOUS ONCE
Status: COMPLETED | OUTPATIENT
Start: 2023-06-05 | End: 2023-06-05

## 2023-06-05 RX ORDER — HYDROMORPHONE HCL IN WATER/PF 6 MG/30 ML
0.4 PATIENT CONTROLLED ANALGESIA SYRINGE INTRAVENOUS
Status: DISCONTINUED | OUTPATIENT
Start: 2023-06-05 | End: 2023-06-05

## 2023-06-05 RX ORDER — BISACODYL 10 MG
10 SUPPOSITORY, RECTAL RECTAL DAILY PRN
Status: DISCONTINUED | OUTPATIENT
Start: 2023-06-05 | End: 2023-06-06 | Stop reason: HOSPADM

## 2023-06-05 RX ORDER — HYDROMORPHONE HYDROCHLORIDE 1 MG/ML
0.4 INJECTION, SOLUTION INTRAMUSCULAR; INTRAVENOUS; SUBCUTANEOUS EVERY 5 MIN PRN
Status: DISCONTINUED | OUTPATIENT
Start: 2023-06-05 | End: 2023-06-05 | Stop reason: HOSPADM

## 2023-06-05 RX ORDER — ONDANSETRON 2 MG/ML
INJECTION INTRAMUSCULAR; INTRAVENOUS PRN
Status: DISCONTINUED | OUTPATIENT
Start: 2023-06-05 | End: 2023-06-05

## 2023-06-05 RX ORDER — HYDROCHLOROTHIAZIDE 25 MG/1
12.5 TABLET ORAL DAILY
COMMUNITY

## 2023-06-05 RX ORDER — LIDOCAINE HYDROCHLORIDE 10 MG/ML
INJECTION, SOLUTION INFILTRATION; PERINEURAL PRN
Status: DISCONTINUED | OUTPATIENT
Start: 2023-06-05 | End: 2023-06-05

## 2023-06-05 RX ORDER — ACETAMINOPHEN 325 MG/1
650 TABLET ORAL EVERY 4 HOURS PRN
Status: DISCONTINUED | OUTPATIENT
Start: 2023-06-08 | End: 2023-06-06 | Stop reason: HOSPADM

## 2023-06-05 RX ORDER — SODIUM CHLORIDE, SODIUM LACTATE, POTASSIUM CHLORIDE, CALCIUM CHLORIDE 600; 310; 30; 20 MG/100ML; MG/100ML; MG/100ML; MG/100ML
INJECTION, SOLUTION INTRAVENOUS CONTINUOUS
Status: DISCONTINUED | OUTPATIENT
Start: 2023-06-05 | End: 2023-06-05 | Stop reason: HOSPADM

## 2023-06-05 RX ORDER — CEFAZOLIN SODIUM/WATER 2 G/20 ML
2 SYRINGE (ML) INTRAVENOUS SEE ADMIN INSTRUCTIONS
Status: DISCONTINUED | OUTPATIENT
Start: 2023-06-05 | End: 2023-06-05 | Stop reason: HOSPADM

## 2023-06-05 RX ORDER — HYDROMORPHONE HCL IN WATER/PF 6 MG/30 ML
0.2 PATIENT CONTROLLED ANALGESIA SYRINGE INTRAVENOUS
Status: DISCONTINUED | OUTPATIENT
Start: 2023-06-05 | End: 2023-06-05

## 2023-06-05 RX ORDER — POLYETHYLENE GLYCOL 3350 17 G/17G
17 POWDER, FOR SOLUTION ORAL DAILY
Status: DISCONTINUED | OUTPATIENT
Start: 2023-06-06 | End: 2023-06-06 | Stop reason: HOSPADM

## 2023-06-05 RX ORDER — ONDANSETRON 2 MG/ML
4 INJECTION INTRAMUSCULAR; INTRAVENOUS EVERY 30 MIN PRN
Status: DISCONTINUED | OUTPATIENT
Start: 2023-06-05 | End: 2023-06-05 | Stop reason: HOSPADM

## 2023-06-05 RX ORDER — PROPOFOL 10 MG/ML
INJECTION, EMULSION INTRAVENOUS PRN
Status: DISCONTINUED | OUTPATIENT
Start: 2023-06-05 | End: 2023-06-05

## 2023-06-05 RX ORDER — LIDOCAINE 40 MG/G
CREAM TOPICAL
Status: DISCONTINUED | OUTPATIENT
Start: 2023-06-05 | End: 2023-06-06 | Stop reason: HOSPADM

## 2023-06-05 RX ORDER — PIPERACILLIN SODIUM, TAZOBACTAM SODIUM 3; .375 G/15ML; G/15ML
3.38 INJECTION, POWDER, LYOPHILIZED, FOR SOLUTION INTRAVENOUS ONCE
Status: COMPLETED | OUTPATIENT
Start: 2023-06-05 | End: 2023-06-05

## 2023-06-05 RX ORDER — ACETAMINOPHEN 650 MG/1
650 SUPPOSITORY RECTAL EVERY 6 HOURS PRN
Status: DISCONTINUED | OUTPATIENT
Start: 2023-06-05 | End: 2023-06-05

## 2023-06-05 RX ORDER — HYDROMORPHONE HYDROCHLORIDE 1 MG/ML
0.2 INJECTION, SOLUTION INTRAMUSCULAR; INTRAVENOUS; SUBCUTANEOUS EVERY 5 MIN PRN
Status: DISCONTINUED | OUTPATIENT
Start: 2023-06-05 | End: 2023-06-05 | Stop reason: HOSPADM

## 2023-06-05 RX ORDER — PROCHLORPERAZINE MALEATE 5 MG
5 TABLET ORAL EVERY 6 HOURS PRN
Status: DISCONTINUED | OUTPATIENT
Start: 2023-06-05 | End: 2023-06-05

## 2023-06-05 RX ORDER — ENOXAPARIN SODIUM 100 MG/ML
40 INJECTION SUBCUTANEOUS EVERY 24 HOURS
Status: DISCONTINUED | OUTPATIENT
Start: 2023-06-06 | End: 2023-06-06 | Stop reason: HOSPADM

## 2023-06-05 RX ORDER — FENTANYL CITRATE 50 UG/ML
50 INJECTION, SOLUTION INTRAMUSCULAR; INTRAVENOUS EVERY 5 MIN PRN
Status: DISCONTINUED | OUTPATIENT
Start: 2023-06-05 | End: 2023-06-05 | Stop reason: HOSPADM

## 2023-06-05 RX ORDER — ATORVASTATIN CALCIUM 40 MG/1
40 TABLET, FILM COATED ORAL DAILY
Status: DISCONTINUED | OUTPATIENT
Start: 2023-06-06 | End: 2023-06-06 | Stop reason: HOSPADM

## 2023-06-05 RX ORDER — HYDROMORPHONE HYDROCHLORIDE 1 MG/ML
0.1 INJECTION, SOLUTION INTRAMUSCULAR; INTRAVENOUS; SUBCUTANEOUS
Status: DISCONTINUED | OUTPATIENT
Start: 2023-06-05 | End: 2023-06-06 | Stop reason: HOSPADM

## 2023-06-05 RX ORDER — AMOXICILLIN 250 MG
2 CAPSULE ORAL 2 TIMES DAILY PRN
Status: DISCONTINUED | OUTPATIENT
Start: 2023-06-05 | End: 2023-06-06 | Stop reason: HOSPADM

## 2023-06-05 RX ORDER — ONDANSETRON 4 MG/1
4 TABLET, ORALLY DISINTEGRATING ORAL EVERY 6 HOURS PRN
Status: DISCONTINUED | OUTPATIENT
Start: 2023-06-05 | End: 2023-06-05

## 2023-06-05 RX ORDER — PROCHLORPERAZINE 25 MG
12.5 SUPPOSITORY, RECTAL RECTAL EVERY 12 HOURS PRN
Status: DISCONTINUED | OUTPATIENT
Start: 2023-06-05 | End: 2023-06-05

## 2023-06-05 RX ORDER — FENTANYL CITRATE 50 UG/ML
INJECTION, SOLUTION INTRAMUSCULAR; INTRAVENOUS PRN
Status: DISCONTINUED | OUTPATIENT
Start: 2023-06-05 | End: 2023-06-05

## 2023-06-05 RX ORDER — ONDANSETRON 4 MG/1
4 TABLET, ORALLY DISINTEGRATING ORAL EVERY 6 HOURS PRN
Status: DISCONTINUED | OUTPATIENT
Start: 2023-06-05 | End: 2023-06-06 | Stop reason: HOSPADM

## 2023-06-05 RX ORDER — AMOXICILLIN 250 MG
1 CAPSULE ORAL 2 TIMES DAILY PRN
Status: DISCONTINUED | OUTPATIENT
Start: 2023-06-05 | End: 2023-06-06 | Stop reason: HOSPADM

## 2023-06-05 RX ORDER — HALOPERIDOL 5 MG/ML
1 INJECTION INTRAMUSCULAR
Status: DISCONTINUED | OUTPATIENT
Start: 2023-06-05 | End: 2023-06-05 | Stop reason: HOSPADM

## 2023-06-05 RX ORDER — HYDROMORPHONE HYDROCHLORIDE 1 MG/ML
0.2 INJECTION, SOLUTION INTRAMUSCULAR; INTRAVENOUS; SUBCUTANEOUS
Status: DISCONTINUED | OUTPATIENT
Start: 2023-06-05 | End: 2023-06-06 | Stop reason: HOSPADM

## 2023-06-05 RX ADMIN — PROPOFOL 50 MG: 10 INJECTION, EMULSION INTRAVENOUS at 10:58

## 2023-06-05 RX ADMIN — ACETAMINOPHEN 650 MG: 325 TABLET ORAL at 05:34

## 2023-06-05 RX ADMIN — SODIUM CHLORIDE: 9 INJECTION, SOLUTION INTRAVENOUS at 17:11

## 2023-06-05 RX ADMIN — ONDANSETRON 4 MG: 2 INJECTION INTRAMUSCULAR; INTRAVENOUS at 05:38

## 2023-06-05 RX ADMIN — Medication 2 G: at 10:54

## 2023-06-05 RX ADMIN — SODIUM CHLORIDE: 9 INJECTION, SOLUTION INTRAVENOUS at 07:02

## 2023-06-05 RX ADMIN — SODIUM CHLORIDE 1000 ML: 9 INJECTION, SOLUTION INTRAVENOUS at 05:35

## 2023-06-05 RX ADMIN — ACETAMINOPHEN 975 MG: 325 TABLET ORAL at 16:50

## 2023-06-05 RX ADMIN — FENTANYL CITRATE 50 MCG: 50 INJECTION, SOLUTION INTRAMUSCULAR; INTRAVENOUS at 11:29

## 2023-06-05 RX ADMIN — HYDRALAZINE HYDROCHLORIDE 50 MG: 25 TABLET, FILM COATED ORAL at 22:38

## 2023-06-05 RX ADMIN — PHENYLEPHRINE HYDROCHLORIDE 100 MCG: 10 INJECTION INTRAVENOUS at 11:11

## 2023-06-05 RX ADMIN — PIPERACILLIN AND TAZOBACTAM 3.38 G: 3; .375 INJECTION, POWDER, LYOPHILIZED, FOR SOLUTION INTRAVENOUS at 06:41

## 2023-06-05 RX ADMIN — ONDANSETRON 4 MG: 2 INJECTION INTRAMUSCULAR; INTRAVENOUS at 13:42

## 2023-06-05 RX ADMIN — SODIUM CHLORIDE, POTASSIUM CHLORIDE, SODIUM LACTATE AND CALCIUM CHLORIDE: 600; 310; 30; 20 INJECTION, SOLUTION INTRAVENOUS at 10:06

## 2023-06-05 RX ADMIN — KETOROLAC TROMETHAMINE 15 MG: 15 INJECTION, SOLUTION INTRAMUSCULAR; INTRAVENOUS at 05:39

## 2023-06-05 RX ADMIN — SENNOSIDES AND DOCUSATE SODIUM 1 TABLET: 50; 8.6 TABLET ORAL at 19:59

## 2023-06-05 RX ADMIN — DEXAMETHASONE SODIUM PHOSPHATE 10 MG: 10 INJECTION, SOLUTION INTRAMUSCULAR; INTRAVENOUS at 10:57

## 2023-06-05 RX ADMIN — PHENYLEPHRINE HYDROCHLORIDE 100 MCG: 10 INJECTION INTRAVENOUS at 11:13

## 2023-06-05 RX ADMIN — LIDOCAINE HYDROCHLORIDE 5 ML: 10 INJECTION, SOLUTION INFILTRATION; PERINEURAL at 10:55

## 2023-06-05 RX ADMIN — ONDANSETRON 4 MG: 2 INJECTION INTRAMUSCULAR; INTRAVENOUS at 11:27

## 2023-06-05 RX ADMIN — PHENYLEPHRINE HYDROCHLORIDE 100 MCG: 10 INJECTION INTRAVENOUS at 11:06

## 2023-06-05 RX ADMIN — Medication 0.5 UNITS: at 11:16

## 2023-06-05 RX ADMIN — SUGAMMADEX 170 MG: 100 INJECTION, SOLUTION INTRAVENOUS at 11:41

## 2023-06-05 RX ADMIN — FENTANYL CITRATE 100 MCG: 50 INJECTION, SOLUTION INTRAMUSCULAR; INTRAVENOUS at 10:55

## 2023-06-05 RX ADMIN — ROCURONIUM BROMIDE 50 MG: 50 INJECTION, SOLUTION INTRAVENOUS at 10:55

## 2023-06-05 RX ADMIN — PROPOFOL 150 MG: 10 INJECTION, EMULSION INTRAVENOUS at 10:55

## 2023-06-05 ASSESSMENT — ACTIVITIES OF DAILY LIVING (ADL)
ADLS_ACUITY_SCORE: 35

## 2023-06-05 NOTE — PHARMACY-ADMISSION MEDICATION HISTORY
Pharmacist Admission Medication History    Admission medication history is complete. The information provided in this note is only as accurate as the sources available at the time of the update.    Medication reconciliation/reorder completed by provider prior to medication history? No    Information Source(s): Patient, Family member and CareEverywhere/SureScripts via in-person    Pertinent Information: none    Changes made to PTA medication list:    Added: None    Deleted: None    Changed: losartan      Allergies reviewed with patient and updates made in EHR: yes    Medication History Completed By: Shaun Swanson RPH 6/5/2023 7:57 AM    Prior to Admission medications    Medication Sig Last Dose Taking? Auth Provider Long Term End Date   atorvastatin (LIPITOR) 40 MG tablet Take 1 tablet (40 mg) by mouth daily 6/4/2023 at am Yes Ricardo Lopez MD Yes    hydrALAZINE (APRESOLINE) 50 MG tablet Take 1 tablet (50 mg) by mouth 2 times daily 6/4/2023 at pm Yes Oli Shay MD Yes 6/5/23   hydrochlorothiazide (HYDRODIURIL) 25 MG tablet Take 25 mg by mouth daily 6/4/2023 at am Yes Unknown, Entered By History No    levothyroxine (SYNTHROID/LEVOTHROID) 50 MCG tablet Take 75 mcg by mouth daily 6/4/2023 at am Yes Unknown, Entered By History Yes    losartan (COZAAR) 50 MG tablet Take 50 mg by mouth daily 6/4/2023 at am Yes Unknown, Entered By History No    vitamin D3 (CHOLECALCIFEROL) 50 mcg (2000 units) tablet Take 1 tablet by mouth daily 6/4/2023 at am Yes Unknown, Entered By History

## 2023-06-05 NOTE — OP NOTE
Name:  Tameka Avalos  PCP:  Eve Brewer  Procedure Date:  6/5/2023      Procedure(s):  CHOLECYSTECTOMY, LAPAROSCOPIC    Pre-Procedure Diagnosis:  Biliary colic [K80.50]     Post-Procedure Diagnosis:    cholecystitis    Surgeon(s):  Nikki Hong PA-C Borut, Jeffrey James, DO    Circulator: Lisa Hodgson RN  Scrub Person: Rachana Cadet; Stephani Beebe    Anesthesia Type:  GET      Findings:  cholecystitis    Operative Report:    The patient was taken to the operating room and placed in a supine position.  Endotracheal intubation was performed.  SCDs were placed on bilateral lower extremities.  Antibiotics were given prior to skin incision.  The abdomen was prepped and draped in a sterile fashion. Our PA Dmitri Quijano was present for the entire case and instrumental in driving the camera and closure of the wounds.    I began the first portion of the procedure by injecting quarter percent Marcaine with epinephrine into the supraumbilical position.  I then made a 5 mm transverse incision above the umbilicus and established a pneumoperitoneum using a Veress needle technique.  Once the pneumoperitoneum was established,I placed a 5 mm trocar with a 5 mm 30  camera into the abdomen.  The surrounding structures were scrutinized for any injuries upon entry.  I did not find any. I placed an 11 mm trocar in the subxiphoid position as well as 2 right upper quadrant 5 mm trochars under direct visualization.  All trochars were placed after local anesthetic was injected to the fascial layers.      I then had my assistant retract the gallbladder cephalad and I persisted to dissect out the cystic duct and cystic artery in the standard fashion using blunt dissection and Bovie electrocautery.  Once I obtained a critical view I then placed several 10 mm titanium clips on the cystic duct 3 distally and one at the gallbladder and infundibulum junction.  I then placed 2 clips across the cystic artery.  Next I  transected both the cystic duct and cystic artery with sharp dissection and removed the gallbladder off the gallbladder fossa using Bovie electrocautery.  I then placed the gallbladder into an Endo Catch bag and brought it out through the subxiphoid port site incision.  Next I achieved hemostasis using Bovie electrocautery and scrutinized the surgical area for any ongoing bleeding.  None was found.  I then closed the 11 mm subxiphoid port site incision using a Ferdinand needle and an 0 Vicryl suture.  Once this was complete I removed all trochars and desufflated the abdomen.  I then injected local anesthetic and all fascial layers and reapproximated the skin edges of all 4 trocar sites with 4-0 Monocryl subcuticular sutures.  The wounds were then cleaned and covered with dry sterile dressings.  All sponge counts and needle counts were correct at the end of the procedure.    Estimated Blood Loss:   5cc  Specimens:    ID Type Source Tests Collected by Time Destination   1 : GALLBLADDER Tissue Gallbladder SURGICAL PATHOLOGY EXAM Giacomo Handley DO 6/5/2023 11:18 AM           Drains:        Complications:    None    Giacomo Handley DO

## 2023-06-05 NOTE — CONSULTS
HPI  68 year old year old female who I have been consulted by No ref. provider found for evaluation of Flank Pain (right)  68-year-old female with a several hour history of epigastric and right upper quadrant pain with radiation to the right flank and right back.  Denies any fevers, chills, nausea or vomiting.  No dark-colored urine or acholic stool.  She states she could not really get comfortable at night and the discomfort prompted her to visit the emergency room for evaluation.  She has no history of biliary colic in the past.  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  She has no other complaints at present.      Allergies:Patient has no known allergies.    Past Medical History:   Diagnosis Date     Chronic kidney disease      Hyperlipidemia      Hypertension      Thyroid disease        Past Surgical History:   Procedure Laterality Date     EP PACEMAKER Left 02/08/2021    Procedure: EP Pacemaker;  Surgeon: Steven Lozano MD;  Location:  [facility]     IMPLANT PACEMAKER           CURRENT MEDS:    Current Facility-Administered Medications:      acetaminophen (TYLENOL) tablet 650 mg, 650 mg, Oral, Q6H PRN **OR** acetaminophen (TYLENOL) Suppository 650 mg, 650 mg, Rectal, Q6H PRN, Osvaldo Carrillo MD     HYDROmorphone (DILAUDID) injection 0.2 mg, 0.2 mg, Intravenous, Q2H PRN, Osvaldo Carrillo MD     HYDROmorphone (DILAUDID) injection 0.4 mg, 0.4 mg, Intravenous, Q2H PRN, Osvaldo Carrillo MD     melatonin tablet 1 mg, 1 mg, Oral, At Bedtime PRN, Osvaldo Carrillo MD     naloxone (NARCAN) injection 0.2 mg, 0.2 mg, Intravenous, Q2 Min PRN **OR** naloxone (NARCAN) injection 0.4 mg, 0.4 mg, Intravenous, Q2 Min PRN **OR** naloxone (NARCAN) injection 0.2 mg, 0.2 mg, Intramuscular, Q2 Min PRN **OR** naloxone (NARCAN) injection 0.4 mg, 0.4 mg, Intramuscular, Q2 Min PRN, Osvaldo Carrillo MD     ondansetron (ZOFRAN ODT) ODT  tab 4 mg, 4 mg, Oral, Q6H PRN **OR** ondansetron (ZOFRAN) injection 4 mg, 4 mg, Intravenous, Q6H PRN, Osvaldo Carrillo MD     prochlorperazine (COMPAZINE) injection 5 mg, 5 mg, Intravenous, Q6H PRN **OR** prochlorperazine (COMPAZINE) tablet 5 mg, 5 mg, Oral, Q6H PRN **OR** prochlorperazine (COMPAZINE) suppository 12.5 mg, 12.5 mg, Rectal, Q12H PRN, Osvaldo Carrillo MD     senna-docusate (SENOKOT-S/PERICOLACE) 8.6-50 MG per tablet 1 tablet, 1 tablet, Oral, BID PRN **OR** senna-docusate (SENOKOT-S/PERICOLACE) 8.6-50 MG per tablet 2 tablet, 2 tablet, Oral, BID PRN, Osvaldo Carrillo MD     sodium chloride 0.9% infusion, , Intravenous, Continuous, Osvaldo Carrillo MD, Last Rate: 100 mL/hr at 06/05/23 0702, New Bag at 06/05/23 0702    Current Outpatient Medications:      atorvastatin (LIPITOR) 40 MG tablet, Take 1 tablet (40 mg) by mouth daily, Disp: 90 tablet, Rfl: 4     hydrALAZINE (APRESOLINE) 50 MG tablet, Take 1 tablet (50 mg) by mouth 2 times daily, Disp: 60 tablet, Rfl: 0     hydrochlorothiazide (HYDRODIURIL) 25 MG tablet, Take 25 mg by mouth daily, Disp: , Rfl:      levothyroxine (SYNTHROID/LEVOTHROID) 50 MCG tablet, Take 75 mcg by mouth daily, Disp: , Rfl:      losartan (COZAAR) 50 MG tablet, Take 50 mg by mouth daily, Disp: , Rfl:      vitamin D3 (CHOLECALCIFEROL) 50 mcg (2000 units) tablet, Take 1 tablet by mouth daily, Disp: , Rfl:     FAMHX-reviewed noncontributory     reports that she has never smoked. She has never used smokeless tobacco.    Review of Systems:  The 12 point review of systems  is within normal limits except for as mentioned above in the HPI.  General ROS: No complaints or constitutional symptoms  Ophthalmic ROS: No complaints of visual changes  Skin: No complaints or symptoms   Endocrine: No complaints or symptoms  Hematologic/Lymphatic: No symptoms or complaints  Psychiatric: No symptoms or complaints  Respiratory ROS: no cough, shortness of breath, or wheezing  Cardiovascular  "ROS: no chest pain or dyspnea on exertion  Gastrointestinal ROS: As per HPI  Genito-Urinary ROS: no dysuria, trouble voiding, or hematuria  Musculoskeletal ROS: no joint or muscle pain  Neurological ROS: no TIA or stroke symptoms      EXAM:  BP (!) 150/89   Pulse 71   Temp 97.7  F (36.5  C) (Oral)   Resp 16   Ht 1.575 m (5' 2\")   Wt 76.7 kg (169 lb)   SpO2 94%   BMI 30.91 kg/m    GENERAL: Well developed female, No acute distress, pleasant and conversant   EYES: Pupils equal, round and reactive, no scleral icterus  ABDOMEN: Soft, nontender, no evidence of a Baeza sign or focal guarding  SKIN: Pink, warm and dry, no obvious rashes or lesions   NEURO:No focal deficits, ambulatory  MUSCULOSKELETAL:No obvious deformities, no swelling, normal appearing      LABS:  Lab Results   Component Value Date    WBC 9.9 06/05/2023    WBC 10.9 02/08/2021    HGB 11.3 06/05/2023    HGB 13.5 02/09/2021    HCT 35.3 06/05/2023    HCT 42.7 02/08/2021    MCV 81 06/05/2023    MCV 82 02/08/2021     06/05/2023     02/08/2021     INR/Prothrombin Time  Recent Labs   Lab 06/05/23  0534      CO2 22   BUN 29.0*     Lab Results   Component Value Date    ALT 18 06/05/2023    AST 24 06/05/2023    ALKPHOS 88 06/05/2023       IMAGES:   Relevant images were reviewed and discussed with the patient.  Notable findings were: CT scan demonstrates mildly distended gallbladder with a gallstone but no pericholecystic inflammation    Assessment/Plan:   Tameka Avalos is a 68 year old female with likely biliary colic.  She does not have any clinical findings of acute cholecystitis at present.  We did discuss the options including discharge to home with outpatient follow-up for elective cholecystectomy versus laparoscopic cholecystectomy during this admission.  She would like to pursue surgery today as opposed to waiting given the fact she does not want undergo any further work-up or have any recurrent episodes.  Risk and benefits of " surgery were explained in detail and she has consented to proceed.  We will plan for laparoscopic cholecystectomy later today.    Additionally, we did discuss the findings of her hiatal hernia and her symptoms of reflux.  I have recommended that she follow-up as an outpatient for ongoing evaluation as well.      Pedro Luis Handley D.O. [facility]  [phone]

## 2023-06-05 NOTE — ANESTHESIA CARE TRANSFER NOTE
Patient: Tameka Avalos    Procedure: Procedure(s):  CHOLECYSTECTOMY, LAPAROSCOPIC       Diagnosis: Biliary colic [K80.50]  Diagnosis Additional Information: No value filed.    Anesthesia Type:   General     Note:    Oropharynx: oropharynx clear of all foreign objects  Level of Consciousness: drowsy  Oxygen Supplementation: face mask  Level of Supplemental Oxygen (L/min / FiO2): 6  Independent Airway: airway patency satisfactory and stable  Dentition: dentition unchanged  Vital Signs Stable: post-procedure vital signs reviewed and stable  Report to RN Given: handoff report given  Patient transferred to: PACU    Handoff Report: Identifed the Patient, Identified the Reponsible Provider, Reviewed the pertinent medical history, Discussed the surgical course, Reviewed Intra-OP anesthesia mangement and issues during anesthesia, Set expectations for post-procedure period and Allowed opportunity for questions and acknowledgement of understanding      Vitals:  Vitals Value Taken Time   /73 06/05/23 1149   Temp 37.2  C (99  F) 06/05/23 1149   Pulse 80 06/05/23 1150   Resp 18 06/05/23 1150   SpO2 97 % 06/05/23 1150   Vitals shown include unvalidated device data.    Electronically Signed By: ULYSSES Ibarra CRNA  June 5, 2023  11:52 AM

## 2023-06-05 NOTE — ANESTHESIA PREPROCEDURE EVALUATION
Anesthesia Pre-Procedure Evaluation    Patient: Tameka Avalos   MRN: [id number] : 1955        Procedure : Procedure(s):  CHOLECYSTECTOMY, LAPAROSCOPIC          Past Medical History:   Diagnosis Date     Chronic kidney disease      Hyperlipidemia      Hypertension      Thyroid disease       Past Surgical History:   Procedure Laterality Date     EP PACEMAKER Left 2021    Procedure: EP Pacemaker;  Surgeon: Steven Lozano MD;  Location:  [facility]     IMPLANT PACEMAKER        No Known Allergies   Social History     Tobacco Use     Smoking status: Never     Smokeless tobacco: Never   Vaping Use     Vaping status: Not on file   Substance Use Topics     Alcohol use: Not on file      Wt Readings from Last 1 Encounters:   23 76.7 kg (169 lb)        Anesthesia Evaluation   Pt has had prior anesthetic.     No history of anesthetic complications       ROS/MED HX  ENT/Pulmonary:  - neg pulmonary ROS     Neurologic:  - neg neurologic ROS     Cardiovascular:     (+) Dyslipidemia hypertension-----pacemaker, Reason placed: 2nd degree heart block. type: AP 6%,  99% at DDD 60/130., - Patient is dependent on pacemaker.     METS/Exercise Tolerance:     Hematologic:     (+) anemia (hgb 11.3),     Musculoskeletal:  - neg musculoskeletal ROS     GI/Hepatic:     (+) cholecystitis/cholelithiasis (s/f lap little),  (-) GERD   Renal/Genitourinary:     (+) renal disease, type: CRI,     Endo:     (+) thyroid problem,     Psychiatric/Substance Use:  - neg psychiatric ROS     Infectious Disease:  - neg infectious disease ROS     Malignancy:  - neg malignancy ROS     Other:            Physical Exam    Airway        Mallampati: III   TM distance: < 3 FB   Neck ROM: full   Mouth opening: > 3 cm    Respiratory Devices and Support         Dental       (+) Minor Abnormalities - some fillings, tiny chips      Cardiovascular   cardiovascular exam normal          Pulmonary   pulmonary exam normal                 OUTSIDE LABS:  CBC:   Lab Results   Component Value Date    WBC 9.9 06/05/2023    WBC 7.4 11/01/2021    HGB 11.3 (L) 06/05/2023    HGB 14.1 11/01/2021    HCT 35.3 06/05/2023    HCT 43.4 11/01/2021     06/05/2023     11/01/2021     BMP:   Lab Results   Component Value Date     06/05/2023     11/01/2021    POTASSIUM 3.7 06/05/2023    POTASSIUM 4.2 11/01/2021    CHLORIDE 110 (H) 06/05/2023    CHLORIDE 107 11/01/2021    CO2 22 06/05/2023    CO2 22 11/01/2021    BUN 29.0 (H) 06/05/2023    BUN 19 11/01/2021    CR 1.41 (H) 06/05/2023    CR 1.20 (H) 11/01/2021     (H) 06/05/2023    GLC 86 11/01/2021     COAGS:   Lab Results   Component Value Date    INR 1.04 02/07/2021     POC:   Lab Results   Component Value Date     (H) 02/08/2021     HEPATIC:   Lab Results   Component Value Date    ALBUMIN 3.4 (L) 06/05/2023    PROTTOTAL 6.3 (L) 06/05/2023    ALT 18 06/05/2023    AST 24 06/05/2023    ALKPHOS 88 06/05/2023    BILITOTAL 0.3 06/05/2023     OTHER:   Lab Results   Component Value Date    BISI 9.0 06/05/2023    MAG 2.2 02/09/2021    LIPASE 62 (H) 06/05/2023    TSH 0.33 04/10/2023    T4 0.93 02/08/2023    CRP 2.1 (H) 02/03/2021    SED 13 02/03/2021       Anesthesia Plan    ASA Status:  3   NPO Status:  NPO Appropriate    Anesthesia Type: General.     - Airway: ETT   Induction: Intravenous, Propofol.   Maintenance: Inhalation.   Techniques and Equipment:     - Airway: Video-Laryngoscope         Consents    Anesthesia Plan(s) and associated risks, benefits, and realistic alternatives discussed. Questions answered and patient/representative(s) expressed understanding.    - Discussed:     - Discussed with:  Patient, Spouse         Postoperative Care    Pain management: IV analgesics, Multi-modal analgesia.   PONV prophylaxis: Ondansetron (or other 5HT-3), Dexamethasone or Solumedrol     Comments:    Other Comments: GETA - glidescope  Decadron 10, zofran 4  Magnet pacemaker - surgical  location + dependence per last device check            Ludivina Beebe MD

## 2023-06-05 NOTE — ED PROVIDER NOTES
EMERGENCY DEPARTMENT ENCOUNTER      NAME: Tameka Avalos  AGE: 68 year old female  YOB: 1955  MRN: [id number]  EVALUATION DATE & TIME: No admission date for patient encounter.    PCP: Eve Brewer    ED PROVIDER: Heena Sandoval M.D.      Chief Complaint   Patient presents with     Flank Pain     right         FINAL IMPRESSION:  1. Biliary colic          ED COURSE & MEDICAL DECISION MAKING:    ED Course as of 06/05/23 0635   Mon Jun 05, 2023   0528 Pt with atypical right low flank discomfort sparing midline, no back midline pain or fall/trauma, neurovascularly intact, no h/o kidney stones and no urinary symptoms. She is ameanble to UA, notes she typically has difficulty providing urine specimen to the point that when she has a primary care appointment upcoming she will urinate into a cup the night before and bring it with her to ensure she is able to provide urine specimen, ok wtih plan to IVF to attempt UA here. She is ok wtih plan to check LFTs, lipase, IVF and ketorolac/tylenol with mild pain without trauma or rash or fever, CT abdomen and pelvis to screen for ureterolihtiasis or other acute flank pathology and clinically reassess, given zofran with some improved nausea.   0608 CBC without elevated WBC with WBC normal at 9.9   0608 CT abdomen with gallstones and possible early cholecystitis. Patient intermittently with RUQ pain when I palpate her RUQ and possible early cholecystitis, US ordered to further investigate if patient has wall thickening or pericholecystic fluid and given acute symtpoms with nausea and pain, surgery paged to discuss case   0623 I spoke with surgeon Dr Handley re: patient and he will see her this AM, zosyn antibiotic therapy begun and hospitalist paged for admission, paitent and  updated   0634 Pt endorsed to hospitalist Dr Carrillo to med surg observation       Pertinent Labs & Imaging studies reviewed. (See chart for details)    5:21 AM I met with the patient to  gather history and perform my exam. ED course and treatment discussed.   6:11 AM I updated the patient with lab and imaging results   6:23 AM I spoke with Dr. Handley with General Surgery     Medical Decision Making    History:    Supplemental history from: Documented in chart, if applicable    External Record(s) reviewed: Documented in chart, if applicable.    Work Up:    Chart documentation includes differential considered and any EKGs or imaging independently interpreted by provider, where specified.    In additional to work up documented, I considered the following work up: Documented in chart, if applicable.    External consultation:    Discussion of management with another provider: Documented in chart, if applicable    Complicating factors:    Care impacted by chronic illness: Chronic Kidney Disease, Hyperlipidemia and Hypertension    Care affected by social determinants of health: N/A    Disposition considerations: Admit.        At the conclusion of the encounter I discussed the results of all of the tests and the disposition. The questions were answered. The patient or family acknowledged understanding and was agreeable with the care plan.     MEDICATIONS GIVEN IN THE EMERGENCY:  Medications   piperacillin-tazobactam (ZOSYN) 3.375 g vial to attach to  mL bag (has no administration in time range)   ondansetron (ZOFRAN) injection 4 mg (4 mg Intravenous $Given 6/5/23 1312)   ketorolac (TORADOL) injection 15 mg (15 mg Intravenous $Given 6/5/23 3739)   acetaminophen (TYLENOL) tablet 650 mg (650 mg Oral $Given 6/5/23 1534)   0.9% sodium chloride BOLUS (1,000 mLs Intravenous $New Bag 6/5/23 3565)       NEW PRESCRIPTIONS STARTED AT TODAY'S ER VISIT  New Prescriptions    No medications on file          =================================================================    HPI      Tameka Avalos is a 68 year old female with PMHx of HTN, HLD, CKD 3, cardiac pacemaker for bradycardia who presents to the ED today  "via walk-in with back pain    The patient reports around 1 AM she was awake and \"couldn't get comfortable\" due to nausea, and right lower \"back ache\" that radiates into the abdomen. The patient states her back feels \"achy\", but feels \"not bad\" right now. The patient has not taken anything for the pain. The patient denies a history of kidney stones and has never had pain like this before. The patient notes she is still able to pass gas. The patient denies any abdominal surgeries, vomiting, diarrhea, fever, or recent falls.  The patient reports no abdominal pain with palpation    REVIEW OF SYSTEMS   All other systems reviewed and are negative except as noted above in HPI.    PAST MEDICAL HISTORY:  Past Medical History:   Diagnosis Date     Chronic kidney disease      Hyperlipidemia      Hypertension      Thyroid disease        PAST SURGICAL HISTORY:  Past Surgical History:   Procedure Laterality Date     EP PACEMAKER Left 02/08/2021    Procedure: EP Pacemaker;  Surgeon: Steven Lozano MD;  Location:  [facility]     IMPLANT PACEMAKER         CURRENT MEDICATIONS:    atorvastatin (LIPITOR) 40 MG tablet  hydrALAZINE (APRESOLINE) 50 MG tablet  hydrochlorothiazide (MICROZIDE) 12.5 MG capsule  levothyroxine (SYNTHROID/LEVOTHROID) 50 MCG tablet  losartan (COZAAR) 50 MG tablet  vitamin D3 (CHOLECALCIFEROL) 50 mcg (2000 units) tablet        ALLERGIES:  No Known Allergies    FAMILY HISTORY:  No family history on file.    SOCIAL HISTORY:   Social History     Socioeconomic History     Marital status:    Tobacco Use     Smoking status: Never     Smokeless tobacco: Never       VITALS:  Patient Vitals for the past 24 hrs:   BP Temp Temp src Pulse Resp SpO2 Height Weight   06/05/23 0621 (!) 155/80 -- -- 72 -- 94 % -- --   06/05/23 0502 (!) 180/95 97.7  F (36.5  C) Oral 71 16 99 % 1.575 m (5' 2\") 76.7 kg (169 lb)       PHYSICAL EXAM    GENERAL: Awake, alert.  In no acute distress.   HEENT: Normocephalic, " atraumatic.  Pupils equal, round and reactive.  Conjunctiva normal.  EOMI.  NECK: No stridor or apparent deformity.  PULMONARY: Symmetrical breath sounds without distress.  Lungs clear to auscultation bilaterally without wheezes, rhonchi or rales.  CARDIO: Regular rate and rhythm.  No significant murmur, rub or gallop.  Radial pulses strong and symmetrical.  ABDOMINAL: Abdomen soft, non-distended but RUQ tender to palpation without rebound or guarding.  No CVAT, no palpable hepatosplenomegaly.  BACK: Midline non tender when all vertebral levels palpated. Right low flank identified as site of discomfort.   EXTREMITIES: No lower extremity swelling or edema.    NEURO: Alert and oriented to person, place and time.  Cranial nerves grossly intact.  No focal motor deficit.  PSYCH: Normal mood and affect  SKIN: No rashes      LAB:  All pertinent labs reviewed and interpreted.  Results for orders placed or performed during the hospital encounter of 06/05/23   CT Abdomen Pelvis w/o Contrast    Impression    IMPRESSION:   1.  No urinary calculus or hydronephrosis.  2.  Cholelithiasis. Mild gallbladder distention. If there is sufficient concern for cholecystitis, consider ultrasound.     Comprehensive metabolic panel   Result Value Ref Range    Sodium 142 136 - 145 mmol/L    Potassium 3.7 3.4 - 5.3 mmol/L    Chloride 110 (H) 98 - 107 mmol/L    Carbon Dioxide (CO2) 22 22 - 29 mmol/L    Anion Gap 10 7 - 15 mmol/L    Urea Nitrogen 29.0 (H) 8.0 - 23.0 mg/dL    Creatinine 1.41 (H) 0.51 - 0.95 mg/dL    Calcium 9.0 8.8 - 10.2 mg/dL    Glucose 116 (H) 70 - 99 mg/dL    Alkaline Phosphatase 88 35 - 104 U/L    AST 24 10 - 35 U/L    ALT 18 10 - 35 U/L    Protein Total 6.3 (L) 6.4 - 8.3 g/dL    Albumin 3.4 (L) 3.5 - 5.2 g/dL    Bilirubin Total 0.3 <=1.2 mg/dL    GFR Estimate 40 (L) >60 mL/min/1.73m2   Result Value Ref Range    Lipase 62 (H) 13 - 60 U/L   Result Value Ref Range    CRP Inflammation <3.00 <5.00 mg/L   UA with Microscopic  reflex to Culture    Specimen: Urine, Clean Catch   Result Value Ref Range    Color Urine Light Yellow Colorless, Straw, Light Yellow, Yellow    Appearance Urine Clear Clear    Glucose Urine Negative Negative mg/dL    Bilirubin Urine Negative Negative    Ketones Urine Negative Negative mg/dL    Specific Gravity Urine 1.019 1.001 - 1.030    Blood Urine Negative Negative    pH Urine 6.0 5.0 - 7.0    Protein Albumin Urine Negative Negative mg/dL    Urobilinogen Urine <2.0 <2.0 mg/dL    Nitrite Urine Negative Negative    Leukocyte Esterase Urine 500 Henna/uL (A) Negative    Bacteria Urine Few (A) None Seen /HPF    Mucus Urine Present (A) None Seen /LPF    RBC Urine 2 <=2 /HPF    WBC Urine 25 (H) <=5 /HPF    Squamous Epithelials Urine 4 (H) <=1 /HPF    Transitional Epithelials Urine 1 <=1 /HPF    Hyaline Casts Urine 1 <=2 /LPF   CBC with platelets and differential   Result Value Ref Range    WBC Count 9.9 4.0 - 11.0 10e3/uL    RBC Count 4.35 3.80 - 5.20 10e6/uL    Hemoglobin 11.3 (L) 11.7 - 15.7 g/dL    Hematocrit 35.3 35.0 - 47.0 %    MCV 81 78 - 100 fL    MCH 26.0 (L) 26.5 - 33.0 pg    MCHC 32.0 31.5 - 36.5 g/dL    RDW 16.9 (H) 10.0 - 15.0 %    Platelet Count 390 150 - 450 10e3/uL    % Neutrophils 65 %    % Lymphocytes 24 %    % Monocytes 8 %    % Eosinophils 2 %    % Basophils 1 %    % Immature Granulocytes 0 %    NRBCs per 100 WBC 0 <1 /100    Absolute Neutrophils 6.5 1.6 - 8.3 10e3/uL    Absolute Lymphocytes 2.4 0.8 - 5.3 10e3/uL    Absolute Monocytes 0.8 0.0 - 1.3 10e3/uL    Absolute Eosinophils 0.2 0.0 - 0.7 10e3/uL    Absolute Basophils 0.1 0.0 - 0.2 10e3/uL    Absolute Immature Granulocytes 0.0 <=0.4 10e3/uL    Absolute NRBCs 0.0 10e3/uL       RADIOLOGY:  Reviewed all pertinent imaging. Please see official radiology report.  CT Abdomen Pelvis w/o Contrast   Final Result   IMPRESSION:    1.  No urinary calculus or hydronephrosis.   2.  Cholelithiasis. Mild gallbladder distention. If there is sufficient concern for  cholecystitis, consider ultrasound.         Abdomen US, limited (RUQ only)    (Results Pending)             I, Linda Mayer, am serving as a scribe to document services personally performed by Dr. Heena Sandoval based on my observation and the provider's statements to me. I, Heena Sandoval MD attest that Linda Mayer is acting in a scribe capacity, has observed my performance of the services and has documented them in accordance with my direction.     Heena Sandoval MD  06/05/23 1314

## 2023-06-05 NOTE — ANESTHESIA POSTPROCEDURE EVALUATION
Patient: Tameka Avalos    Procedure: Procedure(s):  CHOLECYSTECTOMY, LAPAROSCOPIC       Anesthesia Type:  General    Note:  Disposition: Inpatient   Postop Pain Control: Uneventful            Sign Out: Well controlled pain   PONV: No   Neuro/Psych: Uneventful            Sign Out: Acceptable/Baseline neuro status   Airway/Respiratory: Uneventful            Sign Out: Acceptable/Baseline resp. status   CV/Hemodynamics: Uneventful            Sign Out: Acceptable CV status; No obvious hypovolemia; No obvious fluid overload   Other NRE: NONE   DID A NON-ROUTINE EVENT OCCUR?            Last vitals:  Vitals Value Taken Time   /74 06/05/23 1445   Temp 37.2  C (99  F) 06/05/23 1149   Pulse 69 06/05/23 1447   Resp 17 06/05/23 1447   SpO2 97 % 06/05/23 1447   Vitals shown include unvalidated device data.    Electronically Signed By: Ludivina Beebe MD  June 5, 2023  2:49 PM

## 2023-06-05 NOTE — ANESTHESIA PROCEDURE NOTES
Airway       Patient location during procedure: OR       Procedure Start/Stop Times: 6/5/2023 10:58 AM  Staff -        CRNA: Elliot Viveros APRN CRNA       Performed By: CRNA  Consent for Airway        Urgency: elective  Indications and Patient Condition       Indications for airway management: dipesh-procedural       Induction type:intravenous       Mask difficulty assessment: 2 - vent by mask + OA or adjuvant +/- NMBA    Final Airway Details       Final airway type: endotracheal airway       Successful airway: ETT - single and Oral  Endotracheal Airway Details        ETT size (mm): 7.0       Cuffed: yes       Successful intubation technique: video laryngoscopy       VL Blade Size: Glidescope 3       Grade View of Cords: 2       Adjucts: stylet       Position: Right       Measured from: gums/teeth       Secured at (cm): 20       Bite block used: None    Post intubation assessment        Placement verified by: capnometry, equal breath sounds and chest rise        Number of attempts at approach: 1       Number of other approaches attempted: 0       Secured with: silk tape       Ease of procedure: easy       Dentition: Intact and Unchanged    Medication(s) Administered   Medication Administration Time: 6/5/2023 10:58 AM

## 2023-06-05 NOTE — PROGRESS NOTES
"[facility]    Medicine Progress Note - Hospitalist Service    Date of Admission:  6/5/2023    Assessment & Plan   Tameka Avalos is a 68 year old female with PMHx of hypertension, hypothyroidism and GERD who was admitted from [facility] ED under observation status on 6/5/23 for evaluation of right upper quadrant pain and nausea. Found to have bilary colic. CT A/p showed mild gallbladder distention with no cholecystitis. General surgery was consulted. Plan for surgery.    Biliary colic, rule out acute cholecystitis  Cholelithiasis  -CT scan showed mild gallbladder distention, no bile duct dilatation  -LFTs within normal range  -General surgery consulted. Plan laparoscopic cholecystectomy today    Mildly elevated lipase, 62  -Follow up    KODY  -Serum Crt mildly elevated, 1.21  -IVF  -Renal Panel in the AM    Essential hypertension  -Reconcile PTA medications    Hypothyroidism  -Reconcile PTA medications    Hyperlipidemia  -Reconcile PTA medications    Chronic kidney disease stage IIIa  -Avoid nephrotoxins    Abnormal UA  -Contaminated specimen and asymptomatic  -Follow-up urine culture    Obesity  -BMI 30.91         Diet: NPO for Medical/Clinical Reasons Except for: Meds    DVT Prophylaxis: Enoxaparin (Lovenox) SQ  Palma Catheter: Not present  Lines: None     Cardiac Monitoring: None  Code Status: Full Code      Clinically Significant Risk Factors Present on Admission              # Hypoalbuminemia: Lowest albumin = 3.4 g/dL at 6/5/2023  5:34 AM, will monitor as appropriate     # Hypertension: Noted on problem list      # Obesity: Estimated body mass index is 30.91 kg/m  as calculated from the following:    Height as of this encounter: 1.575 m (5' 2\").    Weight as of this encounter: 76.7 kg (169 lb).            Disposition Plan      Expected Discharge Date: 06/06/2023                  Arturo Andre MD  Hospitalist Service  [facility]  Securely message with " [employer] (more info)  Text page via [facility] Paging/Directory   ______________________________________________________________________    Interval History   Seen with  bedside.   RUQ improved. No n/v    Physical Exam   Vital Signs: Temp: 97.7  F (36.5  C) Temp src: Oral BP: (!) 150/89 Pulse: 71   Resp: 16 SpO2: 94 % O2 Device: None (Room air)    Weight: 169 lbs 0 oz    GENERAL: Well developed female, No acute distress, pleasant and conversant   EYES: Pupils equal, round and reactive, no scleral icterus  ABDOMEN: Soft, nontender, no evidence of a Baeza sign or focal guarding  SKIN: Pink, warm and dry, no obvious rashes or lesions   NEURO:No focal deficits, ambulatory  MUSCULOSKELETAL:No obvious deformities, no swelling, normal appearing    Medical Decision Making      40 MINUTES SPENT BY ME on the date of service doing chart review, history, exam, documentation & further activities per the note.  .      Data     I have personally reviewed the following data over the past 24 hrs:    9.9  \   11.3 (L)   / 390     142 110 (H) 29.0 (H) /  116 (H)   3.7 22 1.41 (H) \       ALT: 18 AST: 24 AP: 88 TBILI: 0.3   ALB: 3.4 (L) TOT PROTEIN: 6.3 (L) LIPASE: 62 (H)       Procal: N/A CRP: <3.00 Lactic Acid: N/A         Imaging results reviewed over the past 24 hrs:   Recent Results (from the past 24 hour(s))   CT Abdomen Pelvis w/o Contrast    Narrative    EXAM: CT ABDOMEN PELVIS W/O CONTRAST  LOCATION: [facility]  DATE/TIME: 6/5/2023 5:51 AM CDT    INDICATION: right flank pain  COMPARISON: None.  TECHNIQUE: CT scan of the abdomen and pelvis was performed without IV contrast. Multiplanar reformats were obtained. Dose reduction techniques were used.  CONTRAST: None.    FINDINGS:   LOWER CHEST: Moderate hiatal hernia. Cardiac leads. No consolidation or pleural fluid.    HEPATOBILIARY: Normal contour with no significant mass. No bile duct dilatation. Calcified gallstones. Mild gallbladder  distention.    PANCREAS: Normal.    SPLEEN: Normal.    ADRENAL GLANDS: Normal.    KIDNEYS/BLADDER: No significant mass, stone, or hydronephrosis.    BOWEL: No obstruction or inflammatory change. Appendix is not clearly identified. No evidence for appendicitis.    LYMPH NODES: Normal.    VASCULATURE: No abdominal aortic aneurysm. Mild atherosclerotic calcification.    PELVIC ORGANS: Normal.    MUSCULOSKELETAL: Unremarkable.      Impression    IMPRESSION:   1.  No urinary calculus or hydronephrosis.  2.  Cholelithiasis. Mild gallbladder distention. If there is sufficient concern for cholecystitis, consider ultrasound.       Recent Labs   Lab 06/05/23  0534   WBC 9.9   HGB 11.3*   MCV 81         POTASSIUM 3.7   CHLORIDE 110*   CO2 22   BUN 29.0*   CR 1.41*   ANIONGAP 10   BISI 9.0   *   ALBUMIN 3.4*   PROTTOTAL 6.3*   BILITOTAL 0.3   ALKPHOS 88   ALT 18   AST 24   LIPASE 62*     Most Recent 3 CBC's:Recent Labs   Lab Test 06/05/23  0534 11/01/21  1222 02/09/21  1117 02/08/21  0601   WBC 9.9 7.4  --  10.9   HGB 11.3* 14.1 13.5 13.4   MCV 81 93  --  82    325  --  381     Most Recent 3 BMP's:Recent Labs   Lab Test 06/05/23  0534 11/01/21  1222 02/09/21  0533    141 141   POTASSIUM 3.7 4.2 4.5   CHLORIDE 110* 107 111*   CO2 22 22 25   BUN 29.0* 19 24   CR 1.41* 1.20* 1.04   ANIONGAP 10 12 5   BISI 9.0 9.7 9.1   * 86 82     Most Recent 2 LFT's:Recent Labs   Lab Test 06/05/23  0534 08/18/22  1004   AST 24 15   ALT 18 10   ALKPHOS 88 72   BILITOTAL 0.3 0.5     NOTE: Data reviewed over the past 24 hrs contributes toward MDM complexity

## 2023-06-05 NOTE — H&P
[facility]    History and Physical - Hospitalist Service       Date of Admission:  6/5/2023    Assessment & Plan      Tameka Avalos is a 68 year old female admitted on 6/5/2023. She came to the ED for evaluation of right upper quadrant pain and nausea    #Biliary colic, rule out acute cholecystitis  #Cholelithiasis  -CT scan showed mild gallbladder distention, no bile duct dilatation  -LFTs within normal range  -Pain and nausea control  -Nothing by mouth  -General surgery consult    #Essential hypertension  -Reconcile PTA medications    #Hypothyroidism  -Reconcile PTA medications    #Hyperlipidemia  -Reconcile PTA medications    #Chronic kidney disease stage IIIa  -Avoid nephrotoxins    #Abnormal UA  -Contaminated specimen and asymptomatic  -Follow-up urine culture    #Obesity  -BMI 30.91       Diet: NPO for Medical/Clinical Reasons Except for: Meds    DVT Prophylaxis: Pneumatic Compression Devices  Palma Catheter: Not present  Lines: None     Cardiac Monitoring: None  Code Status: Full Code           Disposition Plan      Expected Discharge Date: 06/06/2023                  Osvaldo Carrillo MD  Hospitalist Service  [facility]  Securely message with [employer] (more info)  Text page via [employer] Paging/Directory     ______________________________________________________________________    Chief Complaint   Right upper quadrant pain, nausea    History is obtained from the patient, electronic health record and emergency department physician    History of Present Illness   Tameka Avalos is a 68 year old female who came to the emergency department for evaluation of right upper quadrant pain and nausea.  Past medical history of bradycardia status post pacemaker, essential hypertension, hyperlipidemia, hypothyroidism, CKD.  Patient complained of right upper quadrant pain without radiation, with associated nausea that started 4 hours prior to ED arrival.  She denied fevers  or chills.  She denies having similar symptoms in the past.  She felt better after treatment in the ED.      Past Medical History    Past Medical History:   Diagnosis Date     Chronic kidney disease      Hyperlipidemia      Hypertension      Thyroid disease        Past Surgical History   Past Surgical History:   Procedure Laterality Date     EP PACEMAKER Left 02/08/2021    Procedure: EP Pacemaker;  Surgeon: Steven Lozano MD;  Location:  [facility]     IMPLANT PACEMAKER         Prior to Admission Medications   Prior to Admission Medications   Prescriptions Last Dose Informant Patient Reported? Taking?   atorvastatin (LIPITOR) 40 MG tablet   No No   Sig: Take 1 tablet (40 mg) by mouth daily   hydrALAZINE (APRESOLINE) 50 MG tablet   No No   Sig: Take 1 tablet (50 mg) by mouth 2 times daily   Patient taking differently: Take 50 mg by mouth 2 times daily Taking   hydrochlorothiazide (MICROZIDE) 12.5 MG capsule   No No   Sig: Take 2 capsules (25 mg) by mouth daily   Patient taking differently: Take 25 mg by mouth daily Taking   levothyroxine (SYNTHROID/LEVOTHROID) 50 MCG tablet   Yes No   Sig: Take 75 mcg by mouth daily   losartan (COZAAR) 50 MG tablet   No No   Sig: Take 1 tablet (50 mg) by mouth 2 times daily   vitamin D3 (CHOLECALCIFEROL) 50 mcg (2000 units) tablet   Yes No   Sig: Take 1 tablet by mouth daily      Facility-Administered Medications: None           Physical Exam   Vital Signs: Temp: 97.7  F (36.5  C) Temp src: Oral BP: (!) 155/80 Pulse: 72   Resp: 16 SpO2: 94 % O2 Device: None (Room air)    Weight: 169 lbs 0 oz    Constitutional: awake and alert  Respiratory: no increased work of breathing and good air exchange  Cardiovascular: regular rate and rhythm and normal S1 and S2  GI: normal bowel sounds, soft and tenderness noted in the right upper quadrant Baeza's sign is absent  Skin: no bruising or bleeding  Musculoskeletal: no lower extremity pitting edema present  Neurologic: Mental  Status Exam:  Level of Alertness:   awake    Medical Decision Making       MANAGEMENT DISCUSSED with the following over the past 24 hours: Patient and        Data     I have personally reviewed the following data over the past 24 hrs:    9.9  \   11.3 (L)   / 390     142 110 (H) 29.0 (H) /  116 (H)   3.7 22 1.41 (H) \       ALT: 18 AST: 24 AP: 88 TBILI: 0.3   ALB: 3.4 (L) TOT PROTEIN: 6.3 (L) LIPASE: 62 (H)       Procal: N/A CRP: <3.00 Lactic Acid: N/A         Imaging results reviewed over the past 24 hrs:   Recent Results (from the past 24 hour(s))   CT Abdomen Pelvis w/o Contrast    Narrative    EXAM: CT ABDOMEN PELVIS W/O CONTRAST  LOCATION: [facility]  DATE/TIME: 6/5/2023 5:51 AM CDT    INDICATION: right flank pain  COMPARISON: None.  TECHNIQUE: CT scan of the abdomen and pelvis was performed without IV contrast. Multiplanar reformats were obtained. Dose reduction techniques were used.  CONTRAST: None.    FINDINGS:   LOWER CHEST: Moderate hiatal hernia. Cardiac leads. No consolidation or pleural fluid.    HEPATOBILIARY: Normal contour with no significant mass. No bile duct dilatation. Calcified gallstones. Mild gallbladder distention.    PANCREAS: Normal.    SPLEEN: Normal.    ADRENAL GLANDS: Normal.    KIDNEYS/BLADDER: No significant mass, stone, or hydronephrosis.    BOWEL: No obstruction or inflammatory change. Appendix is not clearly identified. No evidence for appendicitis.    LYMPH NODES: Normal.    VASCULATURE: No abdominal aortic aneurysm. Mild atherosclerotic calcification.    PELVIC ORGANS: Normal.    MUSCULOSKELETAL: Unremarkable.      Impression    IMPRESSION:   1.  No urinary calculus or hydronephrosis.  2.  Cholelithiasis. Mild gallbladder distention. If there is sufficient concern for cholecystitis, consider ultrasound.

## 2023-06-06 VITALS
DIASTOLIC BLOOD PRESSURE: 81 MMHG | RESPIRATION RATE: 18 BRPM | OXYGEN SATURATION: 94 % | TEMPERATURE: 97.8 F | BODY MASS INDEX: 31.1 KG/M2 | HEART RATE: 68 BPM | HEIGHT: 62 IN | SYSTOLIC BLOOD PRESSURE: 141 MMHG | WEIGHT: 169 LBS

## 2023-06-06 LAB
ALBUMIN SERPL BCG-MCNC: 3.1 G/DL (ref 3.5–5.2)
ALP SERPL-CCNC: 81 U/L (ref 35–104)
ALT SERPL W P-5'-P-CCNC: 28 U/L (ref 10–35)
ANION GAP SERPL CALCULATED.3IONS-SCNC: 9 MMOL/L (ref 7–15)
AST SERPL W P-5'-P-CCNC: 41 U/L (ref 10–35)
BACTERIA UR CULT: NORMAL
BILIRUB SERPL-MCNC: 0.2 MG/DL
BUN SERPL-MCNC: 24.1 MG/DL (ref 8–23)
CALCIUM SERPL-MCNC: 8.4 MG/DL (ref 8.8–10.2)
CHLORIDE SERPL-SCNC: 111 MMOL/L (ref 98–107)
CREAT SERPL-MCNC: 1.3 MG/DL (ref 0.51–0.95)
DEPRECATED HCO3 PLAS-SCNC: 21 MMOL/L (ref 22–29)
ERYTHROCYTE [DISTWIDTH] IN BLOOD BY AUTOMATED COUNT: 17.2 % (ref 10–15)
GFR SERPL CREATININE-BSD FRML MDRD: 44 ML/MIN/1.73M2
GLUCOSE BLDC GLUCOMTR-MCNC: 125 MG/DL (ref 70–99)
GLUCOSE SERPL-MCNC: 123 MG/DL (ref 70–99)
HCT VFR BLD AUTO: 34.5 % (ref 35–47)
HGB BLD-MCNC: 10.9 G/DL (ref 11.7–15.7)
MCH RBC QN AUTO: 25.8 PG (ref 26.5–33)
MCHC RBC AUTO-ENTMCNC: 31.6 G/DL (ref 31.5–36.5)
MCV RBC AUTO: 82 FL (ref 78–100)
PATH REPORT.COMMENTS IMP SPEC: NORMAL
PATH REPORT.COMMENTS IMP SPEC: NORMAL
PATH REPORT.FINAL DX SPEC: NORMAL
PATH REPORT.GROSS SPEC: NORMAL
PATH REPORT.MICROSCOPIC SPEC OTHER STN: NORMAL
PATH REPORT.RELEVANT HX SPEC: NORMAL
PHOTO IMAGE: NORMAL
PLATELET # BLD AUTO: 355 10E3/UL (ref 150–450)
POTASSIUM SERPL-SCNC: 3.8 MMOL/L (ref 3.4–5.3)
PROT SERPL-MCNC: 5.8 G/DL (ref 6.4–8.3)
RBC # BLD AUTO: 4.22 10E6/UL (ref 3.8–5.2)
SODIUM SERPL-SCNC: 141 MMOL/L (ref 136–145)
WBC # BLD AUTO: 15.5 10E3/UL (ref 4–11)

## 2023-06-06 PROCEDURE — 258N000003 HC RX IP 258 OP 636: Performed by: HOSPITALIST

## 2023-06-06 PROCEDURE — 250N000011 HC RX IP 250 OP 636: Performed by: SURGERY

## 2023-06-06 PROCEDURE — G0378 HOSPITAL OBSERVATION PER HR: HCPCS

## 2023-06-06 PROCEDURE — 250N000013 HC RX MED GY IP 250 OP 250 PS 637: Performed by: INTERNAL MEDICINE

## 2023-06-06 PROCEDURE — 88304 TISSUE EXAM BY PATHOLOGIST: CPT | Mod: 26 | Performed by: PATHOLOGY

## 2023-06-06 PROCEDURE — 36415 COLL VENOUS BLD VENIPUNCTURE: CPT | Performed by: INTERNAL MEDICINE

## 2023-06-06 PROCEDURE — 80053 COMPREHEN METABOLIC PANEL: CPT | Performed by: INTERNAL MEDICINE

## 2023-06-06 PROCEDURE — 85027 COMPLETE CBC AUTOMATED: CPT | Performed by: INTERNAL MEDICINE

## 2023-06-06 PROCEDURE — 99239 HOSP IP/OBS DSCHRG MGMT >30: CPT | Performed by: INTERNAL MEDICINE

## 2023-06-06 PROCEDURE — 96372 THER/PROPH/DIAG INJ SC/IM: CPT | Performed by: SURGERY

## 2023-06-06 RX ADMIN — ENOXAPARIN SODIUM 40 MG: 40 INJECTION SUBCUTANEOUS at 05:36

## 2023-06-06 RX ADMIN — ATORVASTATIN CALCIUM 40 MG: 40 TABLET, FILM COATED ORAL at 08:41

## 2023-06-06 RX ADMIN — LEVOTHYROXINE SODIUM 75 MCG: 0.03 TABLET ORAL at 05:35

## 2023-06-06 RX ADMIN — SODIUM CHLORIDE: 9 INJECTION, SOLUTION INTRAVENOUS at 03:01

## 2023-06-06 RX ADMIN — HYDRALAZINE HYDROCHLORIDE 50 MG: 25 TABLET, FILM COATED ORAL at 08:41

## 2023-06-06 ASSESSMENT — ACTIVITIES OF DAILY LIVING (ADL)
DRESSING/BATHING_DIFFICULTY: NO
ADLS_ACUITY_SCORE: 35
ADLS_ACUITY_SCORE: 35
TOILETING_ISSUES: NO
WEAR_GLASSES_OR_BLIND: YES
FALL_HISTORY_WITHIN_LAST_SIX_MONTHS: NO
CONCENTRATING,_REMEMBERING_OR_MAKING_DECISIONS_DIFFICULTY: NO
VISION_MANAGEMENT: GLASSES
WALKING_OR_CLIMBING_STAIRS_DIFFICULTY: NO
HEARING_DIFFICULTY_OR_DEAF: NO
DIFFICULTY_EATING/SWALLOWING: NO
ADLS_ACUITY_SCORE: 35
CHANGE_IN_FUNCTIONAL_STATUS_SINCE_ONSET_OF_CURRENT_ILLNESS/INJURY: NO
ADLS_ACUITY_SCORE: 35
DIFFICULTY_COMMUNICATING: NO
DOING_ERRANDS_INDEPENDENTLY_DIFFICULTY: NO
ADLS_ACUITY_SCORE: 20
ADLS_ACUITY_SCORE: 35

## 2023-06-06 NOTE — PLAN OF CARE
Goal Outcome Evaluation:         Problem: Plan of Care - These are the overarching goals to be used throughout the patient stay.    Goal: Plan of Care Review  Description: The Plan of Care Review/Shift note should be completed every shift.  The Outcome Evaluation is a brief statement about your assessment that the patient is improving, declining, or no change.  This information will be displayed automatically on your shift note.  Outcome: Progressing         Received pt from PACU around 4:20 pm. Pt is on regular diet. Pt reports no pain. A&O x4. Normal saline running at 100 ml/hr. Pt has permanent pacemaker. Independent when walking to the bathroom. Gave pt an IS and educated her on how to use it.      Genny Retana RN

## 2023-06-06 NOTE — PROGRESS NOTES
"[facility]    Medicine Progress Note - Hospitalist Service    Date of Admission:  6/5/2023    Assessment & Plan   Tameka Avalos is a 68 year old female with PMHx of hypertension, hypothyroidism and GERD who was admitted from [facility] ED under observation status on 6/5/23 for evaluation of right upper quadrant pain and nausea. Found to have bilary colic. CT A/p showed mild gallbladder distention with no cholecystitis. General surgery was consulted. Patient had lap little yesterday.     Biliary colic, rule out acute cholecystitis  Cholelithiasis  -CT scan showed mild gallbladder distention, no bile duct dilatation  -General surgery consulted. Plan laparoscopic cholecystectomy on 6/5    Mildly elevated lipase, 62  -Follow up    KODY  -Serum Crt trending down, 1.30--> 1.21--1.41  -IVF  -Renal Panel daily    Essential hypertension  -Reconcile PTA medications    Hypothyroidism  -Reconcile PTA medications    Hyperlipidemia  -Reconcile PTA medications    Chronic kidney disease stage IIIa  -Avoid nephrotoxins    Abnormal UA  -Contaminated specimen and asymptomatic  -Follow-up urine culture    Obesity  -BMI 30.91     Diet: Regular Diet Adult  Diet    DVT Prophylaxis: Enoxaparin (Lovenox) SQ  Palma Catheter: Not present  Lines: None     Cardiac Monitoring: None  Code Status: Full Code      Clinically Significant Risk Factors Present on Admission              # Hypoalbuminemia: Lowest albumin = 3.1 g/dL at 6/6/2023  6:17 AM, will monitor as appropriate     # Hypertension: Noted on problem list      # Obesity: Estimated body mass index is 30.91 kg/m  as calculated from the following:    Height as of this encounter: 1.575 m (5' 2\").    Weight as of this encounter: 76.7 kg (169 lb).            Disposition Plan      Expected Discharge Date: 06/06/2023,  9:00 AM                Arturo Andre MD  Hospitalist Service  [facility]  Securely message with [employer] (more info)  Text " page via [facility] Paging/Directory   ______________________________________________________________________    Interval History   Patient seen and examined. She is doing ok. She reports no pain.  She has no n/v. No bowel movement but passing gas.   She is tolerating regular diet and ambulating.   Labs reviewed. Wbc trended up slitghly. No fever or chills.   General surgery discharging patient today    Physical Exam   Vital Signs: Temp: 97.8  F (36.6  C) Temp src: Oral BP: (!) 141/81 Pulse: 68   Resp: 18 SpO2: 94 % O2 Device: None (Room air) Oxygen Delivery: 2 LPM  Weight: 169 lbs 0 oz    GENERAL: Well developed female, No acute distress, pleasant and conversant   EYES: Pupils equal, round and reactive, no scleral icterus  ABDOMEN: Soft, nontender, no evidence of a Baeza sign or focal guarding  SKIN: Pink, warm and dry, no obvious rashes or lesions   NEURO:No focal deficits, ambulatory  MUSCULOSKELETAL:No obvious deformities, no swelling, normal appearing    Medical Decision Making      40 MINUTES SPENT BY ME on the date of service doing chart review, history, exam, documentation & further activities per the note.  .      Data     I have personally reviewed the following data over the past 24 hrs:    15.5 (H)  \   10.9 (L)   / 355     141 111 (H) 24.1 (H) /  123 (H)   3.8 21 (L) 1.30 (H) \       ALT: 28 AST: 41 (H) AP: 81 TBILI: 0.2   ALB: 3.1 (L) TOT PROTEIN: 5.8 (L) LIPASE: N/A       Imaging results reviewed over the past 24 hrs:   No results found for this or any previous visit (from the past 24 hour(s)).  Recent Labs   Lab 06/06/23  0617 06/06/23  0538 06/05/23  1657 06/05/23  0534   WBC 15.5*  --   --  9.9   HGB 10.9*  --   --  11.3*   MCV 82  --   --  81     --   --  390     --   --  142   POTASSIUM 3.8  --   --  3.7   CHLORIDE 111*  --   --  110*   CO2 21*  --   --  22   BUN 24.1*  --   --  29.0*   CR 1.30*  --  1.28* 1.41*   ANIONGAP 9  --   --  10   BISI 8.4*  --   --  9.0   * 125*  --  116*    ALBUMIN 3.1*  --   --  3.4*   PROTTOTAL 5.8*  --   --  6.3*   BILITOTAL 0.2  --   --  0.3   ALKPHOS 81  --   --  88   ALT 28  --   --  18   AST 41*  --   --  24   LIPASE  --   --   --  62*     Most Recent 3 CBC's:  Recent Labs   Lab Test 06/06/23  0617 06/05/23  0534 11/01/21  1222   WBC 15.5* 9.9 7.4   HGB 10.9* 11.3* 14.1   MCV 82 81 93    390 325     Most Recent 3 BMP's:  Recent Labs   Lab Test 06/06/23  0617 06/06/23  0538 06/05/23  1657 06/05/23  0534 11/01/21  1222     --   --  142 141   POTASSIUM 3.8  --   --  3.7 4.2   CHLORIDE 111*  --   --  110* 107   CO2 21*  --   --  22 22   BUN 24.1*  --   --  29.0* 19   CR 1.30*  --  1.28* 1.41* 1.20*   ANIONGAP 9  --   --  10 12   BISI 8.4*  --   --  9.0 9.7   * 125*  --  116* 86     Most Recent 2 LFT's:  Recent Labs   Lab Test 06/06/23 0617 06/05/23  0534   AST 41* 24   ALT 28 18   ALKPHOS 81 88   BILITOTAL 0.2 0.3     NOTE: Data reviewed over the past 24 hrs contributes toward MDM complexity

## 2023-06-06 NOTE — DISCHARGE INSTRUCTIONS
From your Surgeon:    Follow up:  For straightforward laparoscopic procedures, our practice is to check-in with you over the phone a few days after your procedure.  If you would like a scheduled follow up appointment in clinic, please call us at [phone] to schedule an appointment at your convenience.  If you would prefer to follow up with us further by phone, please let us know so that we may contact you 2-3 weeks following your procedure.        Diet: Regular diet. Patients can have difficulty with constipation following surgery, due in part to the administration of narcotic medications.  If you are suffering with constipation, you should avoid foods such as hard cheeses or red meat.  Foods high in fiber are recommended.      Activity: You should continue to be active at home, including ambulating frequently.  If possible try to limit the amount of time spent in bed.  No water aerobics for two weeks.     Restrictions: You should not lift greater than 15 pounds for 2 weeks, and will want to avoid strenuous physical activity for 1-2 weeks.  You should limit your physical activity if it causes you discomfort; however, this should resolve within 1-2 weeks.   Walking does not count as strenuous physical activity.  You are safe to walk up and down stairs.  Following 2 weeks you may resume all normal physical activity.    Work:  You can return to work once your surgical pain has resolved.  If you perform duties that require lifting, pushing or pulling anything greater than 15 pounds, then you would have to be on light duty for the immediate 2 weeks after your surgery (if your work allows light duty).  After 2 weeks, you can return to work without restrictions.    Wound care: You may remove your Band-aids after a period of 24 hours.  The small white strips on the incisions act like artificial scabs, and will begin to peel at the edges at around 5-7 days.  These can then be removed.  It is normal to have a small rim of  red present around the incisions. This should not, however, extend beyond 1/4 inch from the incision.  If your incisions become increasingly tender, red, or draining, please contact us.       Bathing: You may shower after 24 hours from surgery.  It is ok to get your incisions wet, but avoid rubbing them.  Avoid soaking in bath tubs, or swimming in lakes, pools, or streams for 2 weeks following surgery.

## 2023-06-06 NOTE — UTILIZATION REVIEW
"Admission Status; Secondary Review Determination     Under the authority of the Utilization Management Committee, the utilization review process indicated a secondary review on the above patient.  The review outcome is based on review of the medical records, discussions with staff, and applying clinical experience noted on the date of the review.         (x) Observation Status Appropriate - This patient does not meet hospital inpatient criteria and is placed in observation status. If this patient's primary payer is Medicare and was admitted as an inpatient, Condition Code 44 should be used and patient status changed to \"observation\".       RATIONALE FOR DETERMINATION     Ms. Avalos is a 69 yo female who presents to the ED with abd pain and found to have acute cholecystitis.  Underwent lap little 6/5 without complications.  Doing well post op and likely will discharge home later today.      The severity of illness, intensity of service provided, expected LOS and risk for adverse outcome make the care appropriate for further observation; however, doesn't meet criteria for hospital inpatient admission. Dr Andre notified of this determination, awaiting call back.         The information on this document is developed by the utilization review team in order for the business office to ensure compliance.  This only denotes the appropriateness of proper admission status and does not reflect the quality of care rendered.         The definitions of Inpatient Status and Observation Status used in making the determination above are those provided in the CMS Coverage Manual, Chapter 1 and Chapter 6, section 70.4.        Sincerely,    Humaira Pacheco, DO  Utilization Review  Physician Advisor  [facility]  "

## 2023-06-06 NOTE — PROGRESS NOTES
LS clear, BS active and patient is passing flatus. Voiding urine with no issues.   Up walking halls independently with no discomfort.

## 2023-06-06 NOTE — PROGRESS NOTES
ASSESSMENT:  1. Biliary colic        Tameka Avalos is a 68 year old female who is s/p lap cholecystectomy on 6/5/2023. Patient progressing well post-operatively. Tolerating a diet without nausea, vomiting. Pain well controlled.     PLAN:  - Okay to discharge home from surgical perspective   - No water aerobics for 2 weeks  - Okay to shower 24 hours after surgery  - Discharge instructions placed in chart    SUBJECTIVE:   She is feeling well this morning. Denies pain, nausea or vomiting. Tolerating a regular diet without issues. Passing flatus. Ambulating. Inquiring about discharge home.      Patient Vitals for the past 24 hrs:   BP Temp Temp src Pulse Resp SpO2   06/06/23 0731 (!) 141/81 97.8  F (36.6  C) Oral 68 18 94 %   06/06/23 0006 125/70 97.7  F (36.5  C) Oral 68 18 94 %   06/05/23 2232 133/76 97.8  F (36.6  C) Oral 71 16 93 %   06/05/23 1842 130/86 97.5  F (36.4  C) Oral 73 18 96 %   06/05/23 1730 (!) 170/79 97.7  F (36.5  C) Oral 77 18 94 %   06/05/23 1640 122/69 97.7  F (36.5  C) Oral 70 16 94 %   06/05/23 1603 (!) 164/86 98.1  F (36.7  C) Oral 77 16 93 %   06/05/23 1548 (!) 150/72 98  F (36.7  C) -- 73 -- 93 %   06/05/23 1545 (!) 150/72 -- -- 75 -- 96 %   06/05/23 1530 (!) 151/72 -- -- 73 -- 93 %   06/05/23 1515 139/86 -- -- 80 -- 95 %   06/05/23 1500 125/74 -- -- 72 15 98 %   06/05/23 1445 138/74 -- -- 68 17 96 %   06/05/23 1430 138/73 -- -- 68 17 96 %   06/05/23 1345 139/73 -- -- 69 16 95 %   06/05/23 1330 139/70 -- -- 69 15 97 %   06/05/23 1315 (!) 142/75 -- -- 68 15 97 %   06/05/23 1300 128/73 -- -- 67 12 96 %   06/05/23 1245 126/68 -- -- 65 14 97 %   06/05/23 1230 126/68 -- -- 66 14 97 %   06/05/23 1215 127/69 -- -- 69 16 97 %   06/05/23 1200 127/71 -- -- 69 (!) 7 100 %   06/05/23 1149 132/73 99  F (37.2  C) -- 81 17 97 %         PHYSICAL EXAM:  General: No acute distress, comfortable   Resp: no respiratory distress, breathing comfortably on room air  Abdomen: soft, non-tender to palpation;  non-distended. No rebound or guarding. Incisions clean/dry/intact with overlying steri strips and bandaids.   Extremities: warm and well perfused      06/05 0700 - 06/06 0659  In: 2383.67 [P.O.:240; I.V.:2143.67]  Out: 405 [Urine:400]    Admission on 06/05/2023   Component Date Value     Sodium 06/05/2023 142      Potassium 06/05/2023 3.7      Chloride 06/05/2023 110 (H)      Carbon Dioxide (CO2) 06/05/2023 22      Anion Gap 06/05/2023 10      Urea Nitrogen 06/05/2023 29.0 (H)      Creatinine 06/05/2023 1.41 (H)      Calcium 06/05/2023 9.0      Glucose 06/05/2023 116 (H)      Alkaline Phosphatase 06/05/2023 88      AST 06/05/2023 24      ALT 06/05/2023 18      Protein Total 06/05/2023 6.3 (L)      Albumin 06/05/2023 3.4 (L)      Bilirubin Total 06/05/2023 0.3      GFR Estimate 06/05/2023 40 (L)      Lipase 06/05/2023 62 (H)      CRP Inflammation 06/05/2023 <3.00      Color Urine 06/05/2023 Light Yellow      Appearance Urine 06/05/2023 Clear      Glucose Urine 06/05/2023 Negative      Bilirubin Urine 06/05/2023 Negative      Ketones Urine 06/05/2023 Negative      Specific Gravity Urine 06/05/2023 1.019      Blood Urine 06/05/2023 Negative      pH Urine 06/05/2023 6.0      Protein Albumin Urine 06/05/2023 Negative      Urobilinogen Urine 06/05/2023 <2.0      Nitrite Urine 06/05/2023 Negative      Leukocyte Esterase Urine 06/05/2023 500 Henna/uL (A)      Bacteria Urine 06/05/2023 Few (A)      Mucus Urine 06/05/2023 Present (A)      RBC Urine 06/05/2023 2      WBC Urine 06/05/2023 25 (H)      Squamous Epithelials Uri* 06/05/2023 4 (H)      Transitional Epithelials* 06/05/2023 1      Hyaline Casts Urine 06/05/2023 1      WBC Count 06/05/2023 9.9      RBC Count 06/05/2023 4.35      Hemoglobin 06/05/2023 11.3 (L)      Hematocrit 06/05/2023 35.3      MCV 06/05/2023 81      MCH 06/05/2023 26.0 (L)      MCHC 06/05/2023 32.0      RDW 06/05/2023 16.9 (H)      Platelet Count 06/05/2023 390      % Neutrophils 06/05/2023 65      %  Lymphocytes 06/05/2023 24      % Monocytes 06/05/2023 8      % Eosinophils 06/05/2023 2      % Basophils 06/05/2023 1      % Immature Granulocytes 06/05/2023 0      NRBCs per 100 WBC 06/05/2023 0      Absolute Neutrophils 06/05/2023 6.5      Absolute Lymphocytes 06/05/2023 2.4      Absolute Monocytes 06/05/2023 0.8      Absolute Eosinophils 06/05/2023 0.2      Absolute Basophils 06/05/2023 0.1      Absolute Immature Granul* 06/05/2023 0.0      Absolute NRBCs 06/05/2023 0.0      Culture 06/05/2023 50,000-100,000 CFU/mL Mixture of urogenital alex      Creatinine 06/05/2023 1.28 (H)      GFR Estimate 06/05/2023 45 (L)      Hold Specimen 06/05/2023 JIC      Sodium 06/06/2023 141      Potassium 06/06/2023 3.8      Chloride 06/06/2023 111 (H)      Carbon Dioxide (CO2) 06/06/2023 21 (L)      Anion Gap 06/06/2023 9      Urea Nitrogen 06/06/2023 24.1 (H)      Creatinine 06/06/2023 1.30 (H)      Calcium 06/06/2023 8.4 (L)      Glucose 06/06/2023 123 (H)      Alkaline Phosphatase 06/06/2023 81      AST 06/06/2023 41 (H)      ALT 06/06/2023 28      Protein Total 06/06/2023 5.8 (L)      Albumin 06/06/2023 3.1 (L)      Bilirubin Total 06/06/2023 0.2      GFR Estimate 06/06/2023 44 (L)      WBC Count 06/06/2023 15.5 (H)      RBC Count 06/06/2023 4.22      Hemoglobin 06/06/2023 10.9 (L)      Hematocrit 06/06/2023 34.5 (L)      MCV 06/06/2023 82      MCH 06/06/2023 25.8 (L)      MCHC 06/06/2023 31.6      RDW 06/06/2023 17.2 (H)      Platelet Count 06/06/2023 355      GLUCOSE BY METER POCT 06/06/2023 125 (H)           Nikki Hong PA-C  [facility]  [address] [address]

## 2023-06-06 NOTE — PLAN OF CARE
Goal Outcome Evaluation:    Slept well. Denies pain overnight- declined scheduled tylenol. VSS. IVF infusing.     Pt with good oral intake. Independent in room. Voiding without issue. Four lap sites c/d/I with band-aides covering. Bowel sounds hypoactive. Denies nausea. Hoping to go home today.     POD1 bloodsugar 125.    Temp: 97.7  F (36.5  C) Temp src: Oral BP: 125/70 Pulse: 68   Resp: 18 SpO2: 94 % O2 Device: None (Room air)

## 2023-06-16 ENCOUNTER — ANCILLARY PROCEDURE (OUTPATIENT)
Dept: CARDIOLOGY | Facility: CLINIC | Age: 68
End: 2023-06-16
Attending: INTERNAL MEDICINE
Payer: COMMERCIAL

## 2023-06-16 DIAGNOSIS — Z95.0 CARDIAC PACEMAKER IN SITU: ICD-10-CM

## 2023-06-16 DIAGNOSIS — I49.5 SICK SINUS SYNDROME (H): ICD-10-CM

## 2023-06-27 NOTE — DISCHARGE SUMMARY
"[facility]  Hospitalist Discharge Summary      Date of Admission:  6/5/2023  Date of Discharge:  6/6/2023 11:30 AM  Discharging Provider: Arturo Andre MD  Discharge Service: Hospitalist Service    Discharge Diagnoses   Biliary colic, rule out acute cholecystitis  Cholelithiasis  S/p  laparoscopic cholecystectomy on 6/5   Mildly elevated lipase, 62  KODY  Essential hypertension  Hypothyroidism  Hyperlipidemia  Chronic kidney disease stage IIIa  Abnormal UA  Obesity    Clinically Significant Risk Factors     # Obesity: Estimated body mass index is 30.91 kg/m  as calculated from the following:    Height as of this encounter: 1.575 m (5' 2\").    Weight as of this encounter: 76.7 kg (169 lb).       Follow-ups Needed After Discharge   Follow-up Appointments     Follow-up and recommended labs and tests       Follow up with primary care provider, Eve Brewer, within 7 days for   hospital follow- up.  The following labs/tests are recommended: CMP.    Follow up GENERAL SURGERY            Unresulted Labs Ordered in the Past 30 Days of this Admission     No orders found from 5/6/2023 to 6/6/2023.          Discharge Disposition   Discharged to home  Condition at discharge: Stable    Hospital Course        Consultations This Hospital Stay   SURGERY GENERAL IP CONSULT  CARE MANAGEMENT / SOCIAL WORK IP CONSULT    Code Status   Prior    Time Spent on this Encounter   I, Arturo Andre MD, personally saw the patient today and spent greater than 30 minutes discharging this patient.       Arturo Andre MD  [address] [address]  Phone: [phone]  Fax: [phone]  ______________________________________________________________________    Physical Exam   Vital Signs:                   Weight: 169 lbs 0 oz  GENERAL: Well developed female, No acute distress, pleasant and conversant   EYES: Pupils equal, round and reactive, no scleral " icterus  ABDOMEN: Soft, nontender, no evidence of a Baeza sign or focal guarding  SKIN: Pink, warm and dry, no obvious rashes or lesions   NEURO:No focal deficits, ambulatory  MUSCULOSKELETAL:No obvious deformities, no swelling, normal appearing          Primary Care Physician   Eve Brewer    Discharge Orders      Reason for your hospital stay    ruq     Follow-up and recommended labs and tests     Follow up with primary care provider, Eve Brewer, within 7 days for hospital follow- up.  The following labs/tests are recommended: CMP.    Follow up GENERAL SURGERY     Activity    Your activity upon discharge: activity as tolerated     Diet    Follow this diet upon discharge: Regular       Significant Results and Procedures   Most Recent 3 CBC's:Recent Labs   Lab Test 06/06/23  0617 06/05/23  0534 11/01/21  1222   WBC 15.5* 9.9 7.4   HGB 10.9* 11.3* 14.1   MCV 82 81 93    390 325     Most Recent 3 BMP's:Recent Labs   Lab Test 06/06/23  0617 06/06/23  0538 06/05/23  1657 06/05/23  0534 11/01/21  1222     --   --  142 141   POTASSIUM 3.8  --   --  3.7 4.2   CHLORIDE 111*  --   --  110* 107   CO2 21*  --   --  22 22   BUN 24.1*  --   --  29.0* 19   CR 1.30*  --  1.28* 1.41* 1.20*   ANIONGAP 9  --   --  10 12   BISI 8.4*  --   --  9.0 9.7   * 125*  --  116* 86     Most Recent 2 LFT's:Recent Labs   Lab Test 06/06/23 0617 06/05/23  0534   AST 41* 24   ALT 28 18   ALKPHOS 81 88   BILITOTAL 0.2 0.3   ,   Results for orders placed or performed during the hospital encounter of 06/05/23   CT Abdomen Pelvis w/o Contrast    Narrative    EXAM: CT ABDOMEN PELVIS W/O CONTRAST  LOCATION: [facility]  DATE/TIME: 6/5/2023 5:51 AM CDT    INDICATION: right flank pain  COMPARISON: None.  TECHNIQUE: CT scan of the abdomen and pelvis was performed without IV contrast. Multiplanar reformats were obtained. Dose reduction techniques were used.  CONTRAST: None.    FINDINGS:   LOWER CHEST: Moderate  hiatal hernia. Cardiac leads. No consolidation or pleural fluid.    HEPATOBILIARY: Normal contour with no significant mass. No bile duct dilatation. Calcified gallstones. Mild gallbladder distention.    PANCREAS: Normal.    SPLEEN: Normal.    ADRENAL GLANDS: Normal.    KIDNEYS/BLADDER: No significant mass, stone, or hydronephrosis.    BOWEL: No obstruction or inflammatory change. Appendix is not clearly identified. No evidence for appendicitis.    LYMPH NODES: Normal.    VASCULATURE: No abdominal aortic aneurysm. Mild atherosclerotic calcification.    PELVIC ORGANS: Normal.    MUSCULOSKELETAL: Unremarkable.      Impression    IMPRESSION:   1.  No urinary calculus or hydronephrosis.  2.  Cholelithiasis. Mild gallbladder distention. If there is sufficient concern for cholecystitis, consider ultrasound.           Discharge Medications   Discharge Medication List as of 6/6/2023 10:31 AM      CONTINUE these medications which have NOT CHANGED    Details   atorvastatin (LIPITOR) 40 MG tablet Take 1 tablet (40 mg) by mouth daily, Disp-90 tablet, R-4, E-Prescribe      hydrALAZINE (APRESOLINE) 50 MG tablet Take 1 tablet (50 mg) by mouth 2 times daily, Disp-60 tablet, R-0, E-Prescribe      hydrochlorothiazide (HYDRODIURIL) 25 MG tablet Take 25 mg by mouth daily, Historical      levothyroxine (SYNTHROID/LEVOTHROID) 50 MCG tablet Take 75 mcg by mouth daily, Historical      losartan (COZAAR) 50 MG tablet Take 50 mg by mouth daily, Historical      vitamin D3 (CHOLECALCIFEROL) 50 mcg (2000 units) tablet Take 1 tablet by mouth daily, Historical           Allergies   No Known Allergies

## 2023-07-01 LAB
MDC_IDC_EPISODE_DTM: NORMAL
MDC_IDC_EPISODE_DURATION: 1 S
MDC_IDC_EPISODE_DURATION: 1 S
MDC_IDC_EPISODE_DURATION: 13 S
MDC_IDC_EPISODE_DURATION: 193 S
MDC_IDC_EPISODE_DURATION: 2 S
MDC_IDC_EPISODE_DURATION: 4 S
MDC_IDC_EPISODE_DURATION: 4 S
MDC_IDC_EPISODE_DURATION: 5 S
MDC_IDC_EPISODE_DURATION: 8 S
MDC_IDC_EPISODE_ID: NORMAL
MDC_IDC_EPISODE_TYPE: NORMAL
MDC_IDC_LEAD_IMPLANT_DT: NORMAL
MDC_IDC_LEAD_IMPLANT_DT: NORMAL
MDC_IDC_LEAD_LOCATION: NORMAL
MDC_IDC_LEAD_LOCATION: NORMAL
MDC_IDC_LEAD_LOCATION_DETAIL_1: NORMAL
MDC_IDC_LEAD_LOCATION_DETAIL_1: NORMAL
MDC_IDC_LEAD_MFG: NORMAL
MDC_IDC_LEAD_MFG: NORMAL
MDC_IDC_LEAD_MODEL: NORMAL
MDC_IDC_LEAD_MODEL: NORMAL
MDC_IDC_LEAD_POLARITY_TYPE: NORMAL
MDC_IDC_LEAD_POLARITY_TYPE: NORMAL
MDC_IDC_LEAD_SERIAL: NORMAL
MDC_IDC_LEAD_SERIAL: NORMAL
MDC_IDC_MSMT_BATTERY_DTM: NORMAL
MDC_IDC_MSMT_BATTERY_REMAINING_LONGEVITY: 138 MO
MDC_IDC_MSMT_BATTERY_REMAINING_PERCENTAGE: 100 %
MDC_IDC_MSMT_BATTERY_STATUS: NORMAL
MDC_IDC_MSMT_LEADCHNL_RA_IMPEDANCE_VALUE: 513 OHM
MDC_IDC_MSMT_LEADCHNL_RA_PACING_THRESHOLD_AMPLITUDE: 0.6 V
MDC_IDC_MSMT_LEADCHNL_RA_PACING_THRESHOLD_PULSEWIDTH: 0.4 MS
MDC_IDC_MSMT_LEADCHNL_RV_IMPEDANCE_VALUE: 552 OHM
MDC_IDC_MSMT_LEADCHNL_RV_PACING_THRESHOLD_AMPLITUDE: 1.1 V
MDC_IDC_MSMT_LEADCHNL_RV_PACING_THRESHOLD_PULSEWIDTH: 0.4 MS
MDC_IDC_PG_IMPLANT_DTM: NORMAL
MDC_IDC_PG_MFG: NORMAL
MDC_IDC_PG_MODEL: NORMAL
MDC_IDC_PG_SERIAL: NORMAL
MDC_IDC_PG_TYPE: NORMAL
MDC_IDC_SESS_CLINIC_NAME: NORMAL
MDC_IDC_SESS_DTM: NORMAL
MDC_IDC_SESS_TYPE: NORMAL
MDC_IDC_SET_BRADY_AT_MODE_SWITCH_MODE: NORMAL
MDC_IDC_SET_BRADY_AT_MODE_SWITCH_RATE: 170 {BEATS}/MIN
MDC_IDC_SET_BRADY_LOWRATE: 60 {BEATS}/MIN
MDC_IDC_SET_BRADY_MAX_SENSOR_RATE: 130 {BEATS}/MIN
MDC_IDC_SET_BRADY_MAX_TRACKING_RATE: 130 {BEATS}/MIN
MDC_IDC_SET_BRADY_MODE: NORMAL
MDC_IDC_SET_BRADY_PAV_DELAY_HIGH: 150 MS
MDC_IDC_SET_BRADY_PAV_DELAY_LOW: 200 MS
MDC_IDC_SET_BRADY_SAV_DELAY_HIGH: 150 MS
MDC_IDC_SET_BRADY_SAV_DELAY_LOW: 200 MS
MDC_IDC_SET_LEADCHNL_RA_PACING_AMPLITUDE: 2 V
MDC_IDC_SET_LEADCHNL_RA_PACING_CAPTURE_MODE: NORMAL
MDC_IDC_SET_LEADCHNL_RA_PACING_POLARITY: NORMAL
MDC_IDC_SET_LEADCHNL_RA_PACING_PULSEWIDTH: 0.4 MS
MDC_IDC_SET_LEADCHNL_RA_SENSING_ADAPTATION_MODE: NORMAL
MDC_IDC_SET_LEADCHNL_RA_SENSING_POLARITY: NORMAL
MDC_IDC_SET_LEADCHNL_RA_SENSING_SENSITIVITY: 0.25 MV
MDC_IDC_SET_LEADCHNL_RV_PACING_AMPLITUDE: 1.5 V
MDC_IDC_SET_LEADCHNL_RV_PACING_CAPTURE_MODE: NORMAL
MDC_IDC_SET_LEADCHNL_RV_PACING_POLARITY: NORMAL
MDC_IDC_SET_LEADCHNL_RV_PACING_PULSEWIDTH: 0.4 MS
MDC_IDC_SET_LEADCHNL_RV_SENSING_ADAPTATION_MODE: NORMAL
MDC_IDC_SET_LEADCHNL_RV_SENSING_POLARITY: NORMAL
MDC_IDC_SET_LEADCHNL_RV_SENSING_SENSITIVITY: 1.5 MV
MDC_IDC_SET_ZONE_DETECTION_INTERVAL: 375 MS
MDC_IDC_SET_ZONE_TYPE: NORMAL
MDC_IDC_SET_ZONE_VENDOR_TYPE: NORMAL
MDC_IDC_STAT_AT_BURDEN_PERCENT: 1 %
MDC_IDC_STAT_AT_DTM_END: NORMAL
MDC_IDC_STAT_AT_DTM_START: NORMAL
MDC_IDC_STAT_BRADY_DTM_END: NORMAL
MDC_IDC_STAT_BRADY_DTM_START: NORMAL
MDC_IDC_STAT_BRADY_RA_PERCENT_PACED: 5 %
MDC_IDC_STAT_BRADY_RV_PERCENT_PACED: 99 %
MDC_IDC_STAT_EPISODE_RECENT_COUNT: 0
MDC_IDC_STAT_EPISODE_RECENT_COUNT: 17
MDC_IDC_STAT_EPISODE_RECENT_COUNT_DTM_END: NORMAL
MDC_IDC_STAT_EPISODE_RECENT_COUNT_DTM_START: NORMAL
MDC_IDC_STAT_EPISODE_TYPE: NORMAL
MDC_IDC_STAT_EPISODE_VENDOR_TYPE: NORMAL

## 2023-07-01 PROCEDURE — 93296 REM INTERROG EVL PM/IDS: CPT | Performed by: INTERNAL MEDICINE

## 2023-07-01 PROCEDURE — 93294 REM INTERROG EVL PM/LDLS PM: CPT | Performed by: INTERNAL MEDICINE

## 2023-07-03 ENCOUNTER — TRANSFERRED RECORDS (OUTPATIENT)
Dept: HEALTH INFORMATION MANAGEMENT | Facility: CLINIC | Age: 68
End: 2023-07-03

## 2023-07-03 ENCOUNTER — LAB REQUISITION (OUTPATIENT)
Dept: LAB | Facility: CLINIC | Age: 68
End: 2023-07-03

## 2023-07-03 DIAGNOSIS — I12.9 HYPERTENSIVE CHRONIC KIDNEY DISEASE WITH STAGE 1 THROUGH STAGE 4 CHRONIC KIDNEY DISEASE, OR UNSPECIFIED CHRONIC KIDNEY DISEASE: ICD-10-CM

## 2023-07-03 DIAGNOSIS — N18.32 CHRONIC KIDNEY DISEASE, STAGE 3B (H): ICD-10-CM

## 2023-07-03 LAB — HGB BLD-MCNC: 10.6 G/DL (ref 11.7–15.7)

## 2023-07-03 PROCEDURE — 85018 HEMOGLOBIN: CPT | Performed by: FAMILY MEDICINE

## 2023-07-03 PROCEDURE — 80053 COMPREHEN METABOLIC PANEL: CPT | Performed by: FAMILY MEDICINE

## 2023-07-04 LAB
ALBUMIN SERPL BCG-MCNC: 3.7 G/DL (ref 3.5–5.2)
ALP SERPL-CCNC: 87 U/L (ref 35–104)
ALT SERPL W P-5'-P-CCNC: 16 U/L (ref 0–50)
ANION GAP SERPL CALCULATED.3IONS-SCNC: 9 MMOL/L (ref 7–15)
AST SERPL W P-5'-P-CCNC: 17 U/L (ref 0–45)
BILIRUB SERPL-MCNC: 0.3 MG/DL
BUN SERPL-MCNC: 30.4 MG/DL (ref 8–23)
CALCIUM SERPL-MCNC: 9.5 MG/DL (ref 8.8–10.2)
CHLORIDE SERPL-SCNC: 109 MMOL/L (ref 98–107)
CREAT SERPL-MCNC: 1.46 MG/DL (ref 0.51–0.95)
DEPRECATED HCO3 PLAS-SCNC: 23 MMOL/L (ref 22–29)
GFR SERPL CREATININE-BSD FRML MDRD: 39 ML/MIN/1.73M2
GLUCOSE SERPL-MCNC: 85 MG/DL (ref 70–99)
POTASSIUM SERPL-SCNC: 4.5 MMOL/L (ref 3.4–5.3)
PROT SERPL-MCNC: 5.7 G/DL (ref 6.4–8.3)
SODIUM SERPL-SCNC: 141 MMOL/L (ref 136–145)

## 2023-08-21 ENCOUNTER — ANCILLARY PROCEDURE (OUTPATIENT)
Dept: CARDIOLOGY | Facility: CLINIC | Age: 68
End: 2023-08-21
Attending: INTERNAL MEDICINE
Payer: COMMERCIAL

## 2023-08-21 DIAGNOSIS — I44.2 COMPLETE ATRIOVENTRICULAR BLOCK (H): ICD-10-CM

## 2023-08-21 DIAGNOSIS — Z95.0 PACEMAKER: ICD-10-CM

## 2023-08-21 DIAGNOSIS — I49.5 SICK SINUS SYNDROME (H): ICD-10-CM

## 2023-08-21 PROCEDURE — 93280 PM DEVICE PROGR EVAL DUAL: CPT | Performed by: INTERNAL MEDICINE

## 2023-08-23 LAB
MDC_IDC_LEAD_IMPLANT_DT: NORMAL
MDC_IDC_LEAD_IMPLANT_DT: NORMAL
MDC_IDC_LEAD_LOCATION: NORMAL
MDC_IDC_LEAD_LOCATION: NORMAL
MDC_IDC_LEAD_LOCATION_DETAIL_1: NORMAL
MDC_IDC_LEAD_LOCATION_DETAIL_1: NORMAL
MDC_IDC_LEAD_MFG: NORMAL
MDC_IDC_LEAD_MFG: NORMAL
MDC_IDC_LEAD_MODEL: NORMAL
MDC_IDC_LEAD_MODEL: NORMAL
MDC_IDC_LEAD_POLARITY_TYPE: NORMAL
MDC_IDC_LEAD_POLARITY_TYPE: NORMAL
MDC_IDC_LEAD_SERIAL: NORMAL
MDC_IDC_LEAD_SERIAL: NORMAL
MDC_IDC_MSMT_BATTERY_DTM: NORMAL
MDC_IDC_MSMT_BATTERY_REMAINING_LONGEVITY: 132 MO
MDC_IDC_MSMT_BATTERY_REMAINING_PERCENTAGE: 100 %
MDC_IDC_MSMT_BATTERY_STATUS: NORMAL
MDC_IDC_MSMT_LEADCHNL_RA_IMPEDANCE_VALUE: 512 OHM
MDC_IDC_MSMT_LEADCHNL_RA_PACING_THRESHOLD_AMPLITUDE: 0.7 V
MDC_IDC_MSMT_LEADCHNL_RA_PACING_THRESHOLD_PULSEWIDTH: 0.4 MS
MDC_IDC_MSMT_LEADCHNL_RA_SENSING_INTR_AMPL: 6.2 MV
MDC_IDC_MSMT_LEADCHNL_RV_IMPEDANCE_VALUE: 559 OHM
MDC_IDC_MSMT_LEADCHNL_RV_PACING_THRESHOLD_AMPLITUDE: 1 V
MDC_IDC_MSMT_LEADCHNL_RV_PACING_THRESHOLD_PULSEWIDTH: 0.4 MS
MDC_IDC_MSMT_LEADCHNL_RV_SENSING_INTR_AMPL: 9.3 MV
MDC_IDC_PG_IMPLANT_DTM: NORMAL
MDC_IDC_PG_MFG: NORMAL
MDC_IDC_PG_MODEL: NORMAL
MDC_IDC_PG_SERIAL: NORMAL
MDC_IDC_PG_TYPE: NORMAL
MDC_IDC_SESS_CLINIC_NAME: NORMAL
MDC_IDC_SESS_DTM: NORMAL
MDC_IDC_SESS_TYPE: NORMAL
MDC_IDC_SET_BRADY_AT_MODE_SWITCH_MODE: NORMAL
MDC_IDC_SET_BRADY_AT_MODE_SWITCH_RATE: 170 {BEATS}/MIN
MDC_IDC_SET_BRADY_LOWRATE: 60 {BEATS}/MIN
MDC_IDC_SET_BRADY_MAX_SENSOR_RATE: 130 {BEATS}/MIN
MDC_IDC_SET_BRADY_MAX_TRACKING_RATE: 130 {BEATS}/MIN
MDC_IDC_SET_BRADY_MODE: NORMAL
MDC_IDC_SET_BRADY_PAV_DELAY_HIGH: 150 MS
MDC_IDC_SET_BRADY_PAV_DELAY_LOW: 200 MS
MDC_IDC_SET_BRADY_SAV_DELAY_HIGH: 150 MS
MDC_IDC_SET_BRADY_SAV_DELAY_LOW: 200 MS
MDC_IDC_SET_LEADCHNL_RA_PACING_AMPLITUDE: NORMAL
MDC_IDC_SET_LEADCHNL_RA_PACING_CAPTURE_MODE: NORMAL
MDC_IDC_SET_LEADCHNL_RA_PACING_POLARITY: NORMAL
MDC_IDC_SET_LEADCHNL_RA_PACING_PULSEWIDTH: 0.4 MS
MDC_IDC_SET_LEADCHNL_RA_SENSING_ADAPTATION_MODE: NORMAL
MDC_IDC_SET_LEADCHNL_RA_SENSING_POLARITY: NORMAL
MDC_IDC_SET_LEADCHNL_RA_SENSING_SENSITIVITY: 0.25 MV
MDC_IDC_SET_LEADCHNL_RV_PACING_AMPLITUDE: NORMAL
MDC_IDC_SET_LEADCHNL_RV_PACING_CAPTURE_MODE: NORMAL
MDC_IDC_SET_LEADCHNL_RV_PACING_POLARITY: NORMAL
MDC_IDC_SET_LEADCHNL_RV_PACING_PULSEWIDTH: 0.4 MS
MDC_IDC_SET_LEADCHNL_RV_SENSING_ADAPTATION_MODE: NORMAL
MDC_IDC_SET_LEADCHNL_RV_SENSING_POLARITY: NORMAL
MDC_IDC_SET_LEADCHNL_RV_SENSING_SENSITIVITY: 1.5 MV
MDC_IDC_SET_ZONE_DETECTION_INTERVAL: 375 MS
MDC_IDC_SET_ZONE_TYPE: NORMAL
MDC_IDC_SET_ZONE_VENDOR_TYPE: NORMAL
MDC_IDC_STAT_AT_BURDEN_PERCENT: 1 %
MDC_IDC_STAT_AT_DTM_END: NORMAL
MDC_IDC_STAT_AT_DTM_START: NORMAL
MDC_IDC_STAT_AT_MODE_SW_COUNT: 28
MDC_IDC_STAT_BRADY_DTM_END: NORMAL
MDC_IDC_STAT_BRADY_DTM_START: NORMAL
MDC_IDC_STAT_BRADY_RA_PERCENT_PACED: 5 %
MDC_IDC_STAT_BRADY_RV_PERCENT_PACED: 98 %
MDC_IDC_STAT_EPISODE_RECENT_COUNT: 0
MDC_IDC_STAT_EPISODE_RECENT_COUNT: 28
MDC_IDC_STAT_EPISODE_RECENT_COUNT_DTM_END: NORMAL
MDC_IDC_STAT_EPISODE_RECENT_COUNT_DTM_START: NORMAL
MDC_IDC_STAT_EPISODE_TYPE: NORMAL
MDC_IDC_STAT_EPISODE_VENDOR_TYPE: NORMAL

## 2023-11-02 ENCOUNTER — TELEPHONE (OUTPATIENT)
Dept: CARDIOLOGY | Facility: CLINIC | Age: 68
End: 2023-11-02
Payer: COMMERCIAL

## 2023-11-02 DIAGNOSIS — E78.5 HYPERLIPIDEMIA LDL GOAL <100: ICD-10-CM

## 2023-11-02 RX ORDER — ATORVASTATIN CALCIUM 40 MG/1
40 TABLET, FILM COATED ORAL DAILY
Qty: 90 TABLET | Refills: 0 | Status: SHIPPED | OUTPATIENT
Start: 2023-11-02

## 2023-11-02 NOTE — TELEPHONE ENCOUNTER
Health Call Center    Phone Message    May a detailed message be left on voicemail: yes     Reason for Call: Medication Refill Request    Has the patient contacted the pharmacy for the refill? Yes   Name of medication being requested: atorvastatin (LIPITOR) 40 MG tablet   Provider who prescribed the medication: Dr. Lopez  Pharmacy:      [facility] - [address]    Date medication is needed: 11/2/2023        Pt is out of medication. She is re establishing with Dr. Alvarez     Action Taken: Message routed to:  Other: Cardiology    Travel Screening: Not Applicable        Thank you!  [facility]

## 2023-11-02 NOTE — TELEPHONE ENCOUNTER
Review of chart. OV due for annual in 2/2024. Patient has arranged with [facility]. 90 day supply sent. VIRGINIARn

## 2023-11-21 ENCOUNTER — ANCILLARY PROCEDURE (OUTPATIENT)
Dept: CARDIOLOGY | Facility: CLINIC | Age: 68
End: 2023-11-21
Attending: INTERNAL MEDICINE
Payer: COMMERCIAL

## 2023-11-21 DIAGNOSIS — I49.5 SICK SINUS SYNDROME (H): ICD-10-CM

## 2023-11-21 DIAGNOSIS — Z95.0 CARDIAC PACEMAKER IN SITU: ICD-10-CM

## 2023-11-21 LAB
MDC_IDC_EPISODE_DTM: NORMAL
MDC_IDC_EPISODE_DTM: NORMAL
MDC_IDC_EPISODE_ID: NORMAL
MDC_IDC_EPISODE_ID: NORMAL
MDC_IDC_EPISODE_TYPE: NORMAL
MDC_IDC_EPISODE_TYPE: NORMAL
MDC_IDC_LEAD_CONNECTION_STATUS: NORMAL
MDC_IDC_LEAD_CONNECTION_STATUS: NORMAL
MDC_IDC_LEAD_IMPLANT_DT: NORMAL
MDC_IDC_LEAD_IMPLANT_DT: NORMAL
MDC_IDC_LEAD_LOCATION: NORMAL
MDC_IDC_LEAD_LOCATION: NORMAL
MDC_IDC_LEAD_LOCATION_DETAIL_1: NORMAL
MDC_IDC_LEAD_LOCATION_DETAIL_1: NORMAL
MDC_IDC_LEAD_MFG: NORMAL
MDC_IDC_LEAD_MFG: NORMAL
MDC_IDC_LEAD_MODEL: NORMAL
MDC_IDC_LEAD_MODEL: NORMAL
MDC_IDC_LEAD_POLARITY_TYPE: NORMAL
MDC_IDC_LEAD_POLARITY_TYPE: NORMAL
MDC_IDC_LEAD_SERIAL: NORMAL
MDC_IDC_LEAD_SERIAL: NORMAL
MDC_IDC_MSMT_BATTERY_DTM: NORMAL
MDC_IDC_MSMT_BATTERY_REMAINING_LONGEVITY: 132 MO
MDC_IDC_MSMT_BATTERY_REMAINING_PERCENTAGE: 100 %
MDC_IDC_MSMT_BATTERY_STATUS: NORMAL
MDC_IDC_MSMT_LEADCHNL_RA_IMPEDANCE_VALUE: 506 OHM
MDC_IDC_MSMT_LEADCHNL_RA_PACING_THRESHOLD_AMPLITUDE: 0.6 V
MDC_IDC_MSMT_LEADCHNL_RA_PACING_THRESHOLD_PULSEWIDTH: 0.4 MS
MDC_IDC_MSMT_LEADCHNL_RV_IMPEDANCE_VALUE: 534 OHM
MDC_IDC_MSMT_LEADCHNL_RV_PACING_THRESHOLD_AMPLITUDE: 1.1 V
MDC_IDC_MSMT_LEADCHNL_RV_PACING_THRESHOLD_PULSEWIDTH: 0.4 MS
MDC_IDC_PG_IMPLANT_DTM: NORMAL
MDC_IDC_PG_MFG: NORMAL
MDC_IDC_PG_MODEL: NORMAL
MDC_IDC_PG_SERIAL: NORMAL
MDC_IDC_PG_TYPE: NORMAL
MDC_IDC_SESS_CLINIC_NAME: NORMAL
MDC_IDC_SESS_DTM: NORMAL
MDC_IDC_SESS_TYPE: NORMAL
MDC_IDC_SET_BRADY_AT_MODE_SWITCH_MODE: NORMAL
MDC_IDC_SET_BRADY_AT_MODE_SWITCH_RATE: 170 {BEATS}/MIN
MDC_IDC_SET_BRADY_LOWRATE: 60 {BEATS}/MIN
MDC_IDC_SET_BRADY_MAX_SENSOR_RATE: 130 {BEATS}/MIN
MDC_IDC_SET_BRADY_MAX_TRACKING_RATE: 130 {BEATS}/MIN
MDC_IDC_SET_BRADY_MODE: NORMAL
MDC_IDC_SET_BRADY_PAV_DELAY_HIGH: 150 MS
MDC_IDC_SET_BRADY_PAV_DELAY_LOW: 200 MS
MDC_IDC_SET_BRADY_SAV_DELAY_HIGH: 150 MS
MDC_IDC_SET_BRADY_SAV_DELAY_LOW: 200 MS
MDC_IDC_SET_LEADCHNL_RA_PACING_AMPLITUDE: 2 V
MDC_IDC_SET_LEADCHNL_RA_PACING_CAPTURE_MODE: NORMAL
MDC_IDC_SET_LEADCHNL_RA_PACING_POLARITY: NORMAL
MDC_IDC_SET_LEADCHNL_RA_PACING_PULSEWIDTH: 0.4 MS
MDC_IDC_SET_LEADCHNL_RA_SENSING_ADAPTATION_MODE: NORMAL
MDC_IDC_SET_LEADCHNL_RA_SENSING_POLARITY: NORMAL
MDC_IDC_SET_LEADCHNL_RA_SENSING_SENSITIVITY: 0.25 MV
MDC_IDC_SET_LEADCHNL_RV_PACING_AMPLITUDE: 1.6 V
MDC_IDC_SET_LEADCHNL_RV_PACING_CAPTURE_MODE: NORMAL
MDC_IDC_SET_LEADCHNL_RV_PACING_POLARITY: NORMAL
MDC_IDC_SET_LEADCHNL_RV_PACING_PULSEWIDTH: 0.4 MS
MDC_IDC_SET_LEADCHNL_RV_SENSING_ADAPTATION_MODE: NORMAL
MDC_IDC_SET_LEADCHNL_RV_SENSING_POLARITY: NORMAL
MDC_IDC_SET_LEADCHNL_RV_SENSING_SENSITIVITY: 1.5 MV
MDC_IDC_SET_ZONE_DETECTION_INTERVAL: 375 MS
MDC_IDC_SET_ZONE_STATUS: NORMAL
MDC_IDC_SET_ZONE_TYPE: NORMAL
MDC_IDC_SET_ZONE_VENDOR_TYPE: NORMAL
MDC_IDC_STAT_AT_BURDEN_PERCENT: 0 %
MDC_IDC_STAT_AT_DTM_END: NORMAL
MDC_IDC_STAT_AT_DTM_START: NORMAL
MDC_IDC_STAT_BRADY_DTM_END: NORMAL
MDC_IDC_STAT_BRADY_DTM_START: NORMAL
MDC_IDC_STAT_BRADY_RA_PERCENT_PACED: 3 %
MDC_IDC_STAT_BRADY_RV_PERCENT_PACED: 99 %
MDC_IDC_STAT_EPISODE_RECENT_COUNT: 0
MDC_IDC_STAT_EPISODE_RECENT_COUNT_DTM_END: NORMAL
MDC_IDC_STAT_EPISODE_RECENT_COUNT_DTM_START: NORMAL
MDC_IDC_STAT_EPISODE_TYPE: NORMAL
MDC_IDC_STAT_EPISODE_VENDOR_TYPE: NORMAL

## 2023-11-21 PROCEDURE — 93296 REM INTERROG EVL PM/IDS: CPT | Performed by: INTERNAL MEDICINE

## 2023-11-21 PROCEDURE — 93294 REM INTERROG EVL PM/LDLS PM: CPT | Performed by: INTERNAL MEDICINE

## 2023-11-27 NOTE — Clinical Note
Left msg for pt to return my call. Need to know where he would like to go for the vascular duplex US. Order for compression stockings faxed to [facility]. bedside

## 2024-02-15 ENCOUNTER — TRANSFERRED RECORDS (OUTPATIENT)
Dept: HEALTH INFORMATION MANAGEMENT | Facility: CLINIC | Age: 69
End: 2024-02-15
Payer: COMMERCIAL

## 2024-02-20 ENCOUNTER — ANCILLARY PROCEDURE (OUTPATIENT)
Dept: CARDIOLOGY | Facility: CLINIC | Age: 69
End: 2024-02-20
Attending: INTERNAL MEDICINE
Payer: COMMERCIAL

## 2024-02-20 ENCOUNTER — OFFICE VISIT (OUTPATIENT)
Dept: CARDIOLOGY | Facility: CLINIC | Age: 69
End: 2024-02-20
Payer: COMMERCIAL

## 2024-02-20 VITALS
RESPIRATION RATE: 16 BRPM | SYSTOLIC BLOOD PRESSURE: 106 MMHG | HEIGHT: 62 IN | WEIGHT: 168 LBS | HEART RATE: 86 BPM | DIASTOLIC BLOOD PRESSURE: 72 MMHG | BODY MASS INDEX: 30.91 KG/M2

## 2024-02-20 DIAGNOSIS — I44.2 COMPLETE ATRIOVENTRICULAR BLOCK (H): ICD-10-CM

## 2024-02-20 DIAGNOSIS — I44.1 SECOND DEGREE HEART BLOCK: ICD-10-CM

## 2024-02-20 DIAGNOSIS — I25.119 CORONARY ARTERY DISEASE INVOLVING NATIVE CORONARY ARTERY OF NATIVE HEART WITH ANGINA PECTORIS (H): ICD-10-CM

## 2024-02-20 DIAGNOSIS — Z95.0 PACEMAKER: ICD-10-CM

## 2024-02-20 DIAGNOSIS — I49.5 SICK SINUS SYNDROME (H): ICD-10-CM

## 2024-02-20 DIAGNOSIS — R07.89 OTHER CHEST PAIN: Primary | ICD-10-CM

## 2024-02-20 LAB
MDC_IDC_EPISODE_DTM: NORMAL
MDC_IDC_EPISODE_ID: NORMAL
MDC_IDC_EPISODE_TYPE: NORMAL
MDC_IDC_LEAD_CONNECTION_STATUS: NORMAL
MDC_IDC_LEAD_CONNECTION_STATUS: NORMAL
MDC_IDC_LEAD_IMPLANT_DT: NORMAL
MDC_IDC_LEAD_IMPLANT_DT: NORMAL
MDC_IDC_LEAD_LOCATION: NORMAL
MDC_IDC_LEAD_LOCATION: NORMAL
MDC_IDC_LEAD_LOCATION_DETAIL_1: NORMAL
MDC_IDC_LEAD_LOCATION_DETAIL_1: NORMAL
MDC_IDC_LEAD_MFG: NORMAL
MDC_IDC_LEAD_MFG: NORMAL
MDC_IDC_LEAD_MODEL: NORMAL
MDC_IDC_LEAD_MODEL: NORMAL
MDC_IDC_LEAD_POLARITY_TYPE: NORMAL
MDC_IDC_LEAD_POLARITY_TYPE: NORMAL
MDC_IDC_LEAD_SERIAL: NORMAL
MDC_IDC_LEAD_SERIAL: NORMAL
MDC_IDC_MSMT_BATTERY_DTM: NORMAL
MDC_IDC_MSMT_BATTERY_REMAINING_LONGEVITY: 120 MO
MDC_IDC_MSMT_BATTERY_REMAINING_PERCENTAGE: 100 %
MDC_IDC_MSMT_BATTERY_STATUS: NORMAL
MDC_IDC_MSMT_LEADCHNL_RA_IMPEDANCE_VALUE: 480 OHM
MDC_IDC_MSMT_LEADCHNL_RA_PACING_THRESHOLD_AMPLITUDE: 0.7 V
MDC_IDC_MSMT_LEADCHNL_RA_PACING_THRESHOLD_PULSEWIDTH: 0.4 MS
MDC_IDC_MSMT_LEADCHNL_RV_IMPEDANCE_VALUE: 535 OHM
MDC_IDC_MSMT_LEADCHNL_RV_LEAD_CHANNEL_STATUS: NORMAL
MDC_IDC_MSMT_LEADCHNL_RV_PACING_THRESHOLD_AMPLITUDE: 1.1 V
MDC_IDC_MSMT_LEADCHNL_RV_PACING_THRESHOLD_AMPLITUDE: 1.1 V
MDC_IDC_MSMT_LEADCHNL_RV_PACING_THRESHOLD_PULSEWIDTH: 0.4 MS
MDC_IDC_MSMT_LEADCHNL_RV_PACING_THRESHOLD_PULSEWIDTH: 0.4 MS
MDC_IDC_PG_IMPLANT_DTM: NORMAL
MDC_IDC_PG_MFG: NORMAL
MDC_IDC_PG_MODEL: NORMAL
MDC_IDC_PG_SERIAL: NORMAL
MDC_IDC_PG_TYPE: NORMAL
MDC_IDC_SESS_CLINIC_NAME: NORMAL
MDC_IDC_SESS_DTM: NORMAL
MDC_IDC_SESS_TYPE: NORMAL
MDC_IDC_SET_BRADY_AT_MODE_SWITCH_MODE: NORMAL
MDC_IDC_SET_BRADY_AT_MODE_SWITCH_RATE: 170 {BEATS}/MIN
MDC_IDC_SET_BRADY_LOWRATE: 60 {BEATS}/MIN
MDC_IDC_SET_BRADY_MAX_SENSOR_RATE: 130 {BEATS}/MIN
MDC_IDC_SET_BRADY_MAX_TRACKING_RATE: 130 {BEATS}/MIN
MDC_IDC_SET_BRADY_MODE: NORMAL
MDC_IDC_SET_BRADY_PAV_DELAY_HIGH: 150 MS
MDC_IDC_SET_BRADY_PAV_DELAY_LOW: 200 MS
MDC_IDC_SET_BRADY_SAV_DELAY_HIGH: 150 MS
MDC_IDC_SET_BRADY_SAV_DELAY_LOW: 200 MS
MDC_IDC_SET_LEADCHNL_RA_PACING_AMPLITUDE: NORMAL
MDC_IDC_SET_LEADCHNL_RA_PACING_CAPTURE_MODE: NORMAL
MDC_IDC_SET_LEADCHNL_RA_PACING_POLARITY: NORMAL
MDC_IDC_SET_LEADCHNL_RA_PACING_PULSEWIDTH: 0.4 MS
MDC_IDC_SET_LEADCHNL_RA_SENSING_ADAPTATION_MODE: NORMAL
MDC_IDC_SET_LEADCHNL_RA_SENSING_POLARITY: NORMAL
MDC_IDC_SET_LEADCHNL_RA_SENSING_SENSITIVITY: 0.25 MV
MDC_IDC_SET_LEADCHNL_RV_PACING_AMPLITUDE: NORMAL
MDC_IDC_SET_LEADCHNL_RV_PACING_CAPTURE_MODE: NORMAL
MDC_IDC_SET_LEADCHNL_RV_PACING_POLARITY: NORMAL
MDC_IDC_SET_LEADCHNL_RV_PACING_PULSEWIDTH: 0.4 MS
MDC_IDC_SET_LEADCHNL_RV_SENSING_ADAPTATION_MODE: NORMAL
MDC_IDC_SET_LEADCHNL_RV_SENSING_POLARITY: NORMAL
MDC_IDC_SET_LEADCHNL_RV_SENSING_SENSITIVITY: 1.5 MV
MDC_IDC_SET_ZONE_DETECTION_INTERVAL: 375 MS
MDC_IDC_SET_ZONE_STATUS: NORMAL
MDC_IDC_SET_ZONE_TYPE: NORMAL
MDC_IDC_SET_ZONE_VENDOR_TYPE: NORMAL
MDC_IDC_STAT_AT_BURDEN_PERCENT: 0 %
MDC_IDC_STAT_AT_DTM_END: NORMAL
MDC_IDC_STAT_AT_DTM_START: NORMAL
MDC_IDC_STAT_AT_MODE_SW_COUNT: 0
MDC_IDC_STAT_BRADY_DTM_END: NORMAL
MDC_IDC_STAT_BRADY_DTM_START: NORMAL
MDC_IDC_STAT_BRADY_RA_PERCENT_PACED: 3 %
MDC_IDC_STAT_BRADY_RV_PERCENT_PACED: 99 %
MDC_IDC_STAT_EPISODE_RECENT_COUNT: 0
MDC_IDC_STAT_EPISODE_RECENT_COUNT_DTM_END: NORMAL
MDC_IDC_STAT_EPISODE_RECENT_COUNT_DTM_START: NORMAL
MDC_IDC_STAT_EPISODE_TYPE: NORMAL
MDC_IDC_STAT_EPISODE_VENDOR_TYPE: NORMAL
MDC_IDC_STAT_EPISODE_VENDOR_TYPE: NORMAL

## 2024-02-20 PROCEDURE — 93280 PM DEVICE PROGR EVAL DUAL: CPT | Performed by: INTERNAL MEDICINE

## 2024-02-20 PROCEDURE — 99214 OFFICE O/P EST MOD 30 MIN: CPT | Performed by: INTERNAL MEDICINE

## 2024-02-20 RX ORDER — AMLODIPINE BESYLATE 5 MG/1
5 TABLET ORAL
COMMUNITY
Start: 2024-02-16 | End: 2024-02-21

## 2024-02-20 RX ORDER — MULTIVITAMIN
1 TABLET ORAL
COMMUNITY

## 2024-02-20 NOTE — LETTER
2/20/2024    Eve Brewer MD  [address]  [place] [address]    RE: Tameka Avalos       Dear Colleague,     I had the pleasure of seeing Tameka Avalos in the [facility].    HEART CARE ENCOUNTER CONSULTATON NOTE      [facility]  [phone]      Assessment/Recommendations   Assessment:  1.  Chest pain: Somewhat atypical.  Given risk factors will recommend Lexiscan nuclear stress test for further evaluation.  2.  Dyslipidemia: Well-controlled on statin therapy  3.  Hypertension: Well-controlled  4.  Second-degree heart block status post pacemaker placement in 2021  5.  Hypothyroidism    Plan:  1.  Lexiscan nuclear stress test  2.  Continue on statin therapy and current antihypertensive  3.  Remote device checks every 3 months and follow-up with me with in person device check in 1 year       History of Present Illness/Subjective    HPI: Tameka Avalos is a 68 year old female with history of dyslipidemia, hypothyroidism, hypertension and second-degree type II heart block status post pacemaker placement in 2021 who I am seeing today to establish care.  She has noticed intermittent chest discomfort over the past month.  It is random and not necessarily with exertion.  The pain is in the mid to upper chest area and she has trouble describing it.  It feels like a soreness.  It is nontender and nonpleuritic.  Pacemaker check today looks great.  No significant events.    Echocardiogram 2/6/2021  Possible advanced AV block.  Left ventricular systolic function is normal.  The visual ejection fraction is estimated at 60-65%.  The left ventricle is normal in size.  The right ventricle is normal in structure, function and size.  There is trace to mild mitral regurgitation.     No old studies available for comparison.     Physical Examination  Review of Systems   Vitals: /72 (BP Location: Right arm, Patient Position: Sitting, Cuff Size: Adult Regular)   Pulse  "86   Resp 16   Ht 1.575 m (5' 2\")   Wt 76.2 kg (168 lb)   BMI 30.73 kg/m    BMI= Body mass index is 30.73 kg/m .  Wt Readings from Last 3 Encounters:   02/20/24 76.2 kg (168 lb)   06/05/23 76.7 kg (169 lb)   04/14/23 80.7 kg (178 lb)       General Appearance:   no distress, normal body habitus   ENT/Mouth: membranes moist, no oral lesions or bleeding gums.      EYES:  no scleral icterus, normal conjunctivae   Neck: no carotid bruits or thyromegaly   Chest/Lungs:   lungs are clear to auscultation   Cardiovascular:   Regular. Normal first and second heart sounds with no murmur  no edema bilaterally    Abdomen:  no organomegaly, masses, bruits, or tenderness; bowel sounds are present   Extremities: no cyanosis or clubbing   Skin: no xanthelasma, warm.    Neurologic: normal  bilateral, no tremors     Psychiatric: alert and oriented x3, calm        Please refer above for cardiac ROS details.        Medical History  Surgical History Family History Social History   Past Medical History:   Diagnosis Date    Chronic kidney disease     Hyperlipidemia     Hypertension     Thyroid disease      Past Surgical History:   Procedure Laterality Date    CHOLECYSTECTOMY  06/05/2023    EP PACEMAKER Left 02/08/2021    Procedure: EP Pacemaker;  Surgeon: Steven Lozano MD;  Location: [facility] CARDIAC CATH LAB    IMPLANT PACEMAKER      LAPAROSCOPIC CHOLECYSTECTOMY N/A 6/5/2023    Procedure: CHOLECYSTECTOMY, LAPAROSCOPIC;  Surgeon: Giacomo Handley DO;  Location: [facility] OR     No family history of heart disease     Social History     Socioeconomic History    Marital status:      Spouse name: Not on file    Number of children: Not on file    Years of education: Not on file    Highest education level: Not on file   Occupational History    Not on file   Tobacco Use    Smoking status: Never    Smokeless tobacco: Never   Substance and Sexual Activity    Alcohol use: Not on file    Drug use: Not on file    Sexual " "activity: Not on file   Other Topics Concern    Not on file   Social History Narrative    Not on file     Social Determinants of Health     Financial Resource Strain: Not on file   Food Insecurity: Not on file   Transportation Needs: Not on file   Physical Activity: Not on file   Stress: Not on file   Social Connections: Not on file   Interpersonal Safety: Not on file   Housing Stability: Not on file           Medications  Allergies   Current Outpatient Medications   Medication Sig Dispense Refill    atorvastatin (LIPITOR) 40 MG tablet Take 1 tablet (40 mg) by mouth daily 90 tablet 0    hydrochlorothiazide (HYDRODIURIL) 25 MG tablet Take 12.5 mg by mouth daily      levothyroxine (SYNTHROID/LEVOTHROID) 50 MCG tablet Take 75 mcg by mouth daily      losartan (COZAAR) 50 MG tablet Take 50 mg by mouth daily      Multiple vitamin TABS Take 1 tablet by mouth      vitamin D3 (CHOLECALCIFEROL) 50 mcg (2000 units) tablet Take 1 tablet by mouth daily      amLODIPine (NORVASC) 5 MG tablet Take 5 mg by mouth (Patient not taking: Reported on 2/20/2024)      hydrALAZINE (APRESOLINE) 50 MG tablet Take 1 tablet (50 mg) by mouth 2 times daily 60 tablet 0     No Known Allergies       Lab Results    Chemistry/lipid CBC Cardiac Enzymes/BNP/TSH/INR   Recent Labs   Lab Test 02/21/23  1155   CHOL 144   HDL 49*   LDL 77   TRIG 88     Recent Labs   Lab Test 02/21/23  1155 02/08/23  1145 08/18/22  1004   LDL 77 60 101     Recent Labs   Lab Test 07/03/23  1237      POTASSIUM 4.5   CHLORIDE 109*   CO2 23   GLC 85   BUN 30.4*   CR 1.46*   GFRESTIMATED 39*   BISI 9.5     Recent Labs   Lab Test 07/03/23  1237 06/06/23  0617 06/05/23  1657   CR 1.46* 1.30* 1.28*     No results for input(s): \"A1C\" in the last 94576 hours.       Recent Labs   Lab Test 07/03/23  1237 06/06/23  0617   WBC  --  15.5*   HGB 10.6* 10.9*   HCT  --  34.5*   MCV  --  82   PLT  --  355     Recent Labs   Lab Test 07/03/23  1237 06/06/23  0617 06/05/23  0534   HGB 10.6* " "10.9* 11.3*    Recent Labs   Lab Test 02/05/21  1653   TROPONINI <0.01     No results for input(s): \"BNP\", \"NTBNPI\", \"NTBNP\" in the last 17100 hours.  Recent Labs   Lab Test 04/10/23  1317   TSH 0.33     Recent Labs   Lab Test 02/07/21  1923   INR 1.04        Cary Alvarez MD                Thank you for allowing me to participate in the care of your patient.      Sincerely,     Cary Alvarez MD     [facility]  cc:   Heena Duarte MD  [address] [facility] [place] [address]      "

## 2024-02-20 NOTE — PROGRESS NOTES
"  HEART CARE ENCOUNTER CONSULTATON NOTE      [facility]  [phone]      Assessment/Recommendations   Assessment:  1.  Chest pain: Somewhat atypical.  Given risk factors will recommend Lexiscan nuclear stress test for further evaluation.  2.  Dyslipidemia: Well-controlled on statin therapy  3.  Hypertension: Well-controlled  4.  Second-degree heart block status post pacemaker placement in 2021  5.  Hypothyroidism    Plan:  1.  Lexiscan nuclear stress test  2.  Continue on statin therapy and current antihypertensive  3.  Remote device checks every 3 months and follow-up with me with in person device check in 1 year       History of Present Illness/Subjective    HPI: Tameka Avalos is a 68 year old female with history of dyslipidemia, hypothyroidism, hypertension and second-degree type II heart block status post pacemaker placement in 2021 who I am seeing today to establish care.  She has noticed intermittent chest discomfort over the past month.  It is random and not necessarily with exertion.  The pain is in the mid to upper chest area and she has trouble describing it.  It feels like a soreness.  It is nontender and nonpleuritic.  Pacemaker check today looks great.  No significant events.    Echocardiogram 2/6/2021  Possible advanced AV block.  Left ventricular systolic function is normal.  The visual ejection fraction is estimated at 60-65%.  The left ventricle is normal in size.  The right ventricle is normal in structure, function and size.  There is trace to mild mitral regurgitation.     No old studies available for comparison.     Physical Examination  Review of Systems   Vitals: /72 (BP Location: Right arm, Patient Position: Sitting, Cuff Size: Adult Regular)   Pulse 86   Resp 16   Ht 1.575 m (5' 2\")   Wt 76.2 kg (168 lb)   BMI 30.73 kg/m    BMI= Body mass index is 30.73 kg/m .  Wt Readings from Last 3 Encounters:   02/20/24 76.2 kg (168 lb)   06/05/23 76.7 kg (169 lb) "   04/14/23 80.7 kg (178 lb)       General Appearance:   no distress, normal body habitus   ENT/Mouth: membranes moist, no oral lesions or bleeding gums.      EYES:  no scleral icterus, normal conjunctivae   Neck: no carotid bruits or thyromegaly   Chest/Lungs:   lungs are clear to auscultation   Cardiovascular:   Regular. Normal first and second heart sounds with no murmur  no edema bilaterally    Abdomen:  no organomegaly, masses, bruits, or tenderness; bowel sounds are present   Extremities: no cyanosis or clubbing   Skin: no xanthelasma, warm.    Neurologic: normal  bilateral, no tremors     Psychiatric: alert and oriented x3, calm        Please refer above for cardiac ROS details.        Medical History  Surgical History Family History Social History   Past Medical History:   Diagnosis Date    Chronic kidney disease     Hyperlipidemia     Hypertension     Thyroid disease      Past Surgical History:   Procedure Laterality Date    CHOLECYSTECTOMY  06/05/2023    EP PACEMAKER Left 02/08/2021    Procedure: EP Pacemaker;  Surgeon: Steven Lozano MD;  Location: [facility] CARDIAC CATH LAB    IMPLANT PACEMAKER      LAPAROSCOPIC CHOLECYSTECTOMY N/A 6/5/2023    Procedure: CHOLECYSTECTOMY, LAPAROSCOPIC;  Surgeon: Giacomo Handley DO;  Location: [facility] OR     No family history of heart disease     Social History     Socioeconomic History    Marital status:      Spouse name: Not on file    Number of children: Not on file    Years of education: Not on file    Highest education level: Not on file   Occupational History    Not on file   Tobacco Use    Smoking status: Never    Smokeless tobacco: Never   Substance and Sexual Activity    Alcohol use: Not on file    Drug use: Not on file    Sexual activity: Not on file   Other Topics Concern    Not on file   Social History Narrative    Not on file     Social Determinants of Health     Financial Resource Strain: Not on file   Food Insecurity: Not on file  "  Transportation Needs: Not on file   Physical Activity: Not on file   Stress: Not on file   Social Connections: Not on file   Interpersonal Safety: Not on file   Housing Stability: Not on file           Medications  Allergies   Current Outpatient Medications   Medication Sig Dispense Refill    atorvastatin (LIPITOR) 40 MG tablet Take 1 tablet (40 mg) by mouth daily 90 tablet 0    hydrochlorothiazide (HYDRODIURIL) 25 MG tablet Take 12.5 mg by mouth daily      levothyroxine (SYNTHROID/LEVOTHROID) 50 MCG tablet Take 75 mcg by mouth daily      losartan (COZAAR) 50 MG tablet Take 50 mg by mouth daily      Multiple vitamin TABS Take 1 tablet by mouth      vitamin D3 (CHOLECALCIFEROL) 50 mcg (2000 units) tablet Take 1 tablet by mouth daily      amLODIPine (NORVASC) 5 MG tablet Take 5 mg by mouth (Patient not taking: Reported on 2/20/2024)      hydrALAZINE (APRESOLINE) 50 MG tablet Take 1 tablet (50 mg) by mouth 2 times daily 60 tablet 0     No Known Allergies       Lab Results    Chemistry/lipid CBC Cardiac Enzymes/BNP/TSH/INR   Recent Labs   Lab Test 02/21/23  1155   CHOL 144   HDL 49*   LDL 77   TRIG 88     Recent Labs   Lab Test 02/21/23  1155 02/08/23  1145 08/18/22  1004   LDL 77 60 101     Recent Labs   Lab Test 07/03/23  1237      POTASSIUM 4.5   CHLORIDE 109*   CO2 23   GLC 85   BUN 30.4*   CR 1.46*   GFRESTIMATED 39*   BISI 9.5     Recent Labs   Lab Test 07/03/23  1237 06/06/23  0617 06/05/23  1657   CR 1.46* 1.30* 1.28*     No results for input(s): \"A1C\" in the last 88425 hours.       Recent Labs   Lab Test 07/03/23  1237 06/06/23  0617   WBC  --  15.5*   HGB 10.6* 10.9*   HCT  --  34.5*   MCV  --  82   PLT  --  355     Recent Labs   Lab Test 07/03/23  1237 06/06/23  0617 06/05/23  0534   HGB 10.6* 10.9* 11.3*    Recent Labs   Lab Test 02/05/21  1653   TROPONINI <0.01     No results for input(s): \"BNP\", \"NTBNPI\", \"NTBNP\" in the last 82461 hours.  Recent Labs   Lab Test 04/10/23  1317   TSH 0.33     Recent " Labs   Lab Test 02/07/21 1923   INR 1.04        Cary Alvarez MD

## 2024-02-21 ENCOUNTER — HOSPITAL ENCOUNTER (OUTPATIENT)
Dept: CARDIOLOGY | Facility: HOSPITAL | Age: 69
Discharge: HOME | End: 2024-02-21
Attending: INTERNAL MEDICINE
Payer: COMMERCIAL

## 2024-02-21 ENCOUNTER — HOSPITAL ENCOUNTER (OUTPATIENT)
Dept: NUCLEAR MEDICINE | Facility: HOSPITAL | Age: 69
Discharge: HOME | End: 2024-02-21
Attending: INTERNAL MEDICINE
Payer: COMMERCIAL

## 2024-02-21 LAB
CV STRESS CURRENT BP HE: NORMAL
CV STRESS CURRENT HR HE: 70
CV STRESS CURRENT HR HE: 72
CV STRESS CURRENT HR HE: 76
CV STRESS CURRENT HR HE: 77
CV STRESS CURRENT HR HE: 77
CV STRESS CURRENT HR HE: 79
CV STRESS CURRENT HR HE: 82
CV STRESS CURRENT HR HE: 82
CV STRESS CURRENT HR HE: 84
CV STRESS CURRENT HR HE: 87
CV STRESS CURRENT HR HE: 87
CV STRESS CURRENT HR HE: 88
CV STRESS CURRENT HR HE: 88
CV STRESS CURRENT HR HE: 90
CV STRESS CURRENT HR HE: 95
CV STRESS CURRENT HR HE: 97
CV STRESS DEVIATION TIME HE: NORMAL
CV STRESS ECHO PERCENT HR HE: NORMAL
CV STRESS EXERCISE STAGE HE: NORMAL
CV STRESS FINAL RESTING BP HE: NORMAL
CV STRESS FINAL RESTING HR HE: 87
CV STRESS MAX HR HE: 100
CV STRESS MAX TREADMILL GRADE HE: 0
CV STRESS MAX TREADMILL SPEED HE: 0
CV STRESS PEAK DIA BP HE: NORMAL
CV STRESS PEAK SYS BP HE: NORMAL
CV STRESS PHASE HE: NORMAL
CV STRESS PROTOCOL HE: NORMAL
CV STRESS RESTING PT POSITION HE: NORMAL
CV STRESS ST DEVIATION AMOUNT HE: NORMAL
CV STRESS ST DEVIATION ELEVATION HE: NORMAL
CV STRESS ST EVELATION AMOUNT HE: NORMAL
CV STRESS TEST TYPE HE: NORMAL
CV STRESS TOTAL STAGE TIME MIN 1 HE: NORMAL
NUC STRESS EJECTION FRACTION: 79 %
RATE PRESSURE PRODUCT: NORMAL
STRESS ECHO BASELINE DIASTOLIC HE: 75
STRESS ECHO BASELINE HR: 78
STRESS ECHO BASELINE SYSTOLIC BP: 133
STRESS ECHO CALCULATED PERCENT HR: 66 %
STRESS ECHO LAST STRESS DIASTOLIC BP: 64
STRESS ECHO LAST STRESS HR: 84
STRESS ECHO LAST STRESS SYSTOLIC BP: 125
STRESS ECHO TARGET HR: 151

## 2024-02-21 PROCEDURE — 93016 CV STRESS TEST SUPVJ ONLY: CPT | Performed by: INTERNAL MEDICINE

## 2024-02-21 PROCEDURE — 78452 HT MUSCLE IMAGE SPECT MULT: CPT | Mod: 26 | Performed by: INTERNAL MEDICINE

## 2024-02-21 PROCEDURE — 93018 CV STRESS TEST I&R ONLY: CPT | Performed by: INTERNAL MEDICINE

## 2024-02-21 PROCEDURE — A9500 TC99M SESTAMIBI: HCPCS | Performed by: INTERNAL MEDICINE

## 2024-02-21 PROCEDURE — 250N000011 HC RX IP 250 OP 636: Performed by: INTERNAL MEDICINE

## 2024-02-21 PROCEDURE — 343N000001 HC RX 343: Performed by: INTERNAL MEDICINE

## 2024-02-21 PROCEDURE — 93017 CV STRESS TEST TRACING ONLY: CPT

## 2024-02-21 PROCEDURE — 78452 HT MUSCLE IMAGE SPECT MULT: CPT

## 2024-02-21 RX ORDER — AMINOPHYLLINE 25 MG/ML
50 INJECTION, SOLUTION INTRAVENOUS
Status: DISCONTINUED | OUTPATIENT
Start: 2024-02-21 | End: 2024-02-21 | Stop reason: HOSPADM

## 2024-02-21 RX ORDER — REGADENOSON 0.08 MG/ML
0.4 INJECTION, SOLUTION INTRAVENOUS ONCE
Status: COMPLETED | OUTPATIENT
Start: 2024-02-21 | End: 2024-02-21

## 2024-02-21 RX ORDER — CAFFEINE CITRATE 20 MG/ML
60 SOLUTION INTRAVENOUS
Status: DISCONTINUED | OUTPATIENT
Start: 2024-02-21 | End: 2024-02-21 | Stop reason: HOSPADM

## 2024-02-21 RX ORDER — ALBUTEROL SULFATE 0.83 MG/ML
2.5 SOLUTION RESPIRATORY (INHALATION)
Status: DISCONTINUED | OUTPATIENT
Start: 2024-02-21 | End: 2024-02-21 | Stop reason: HOSPADM

## 2024-02-21 RX ORDER — CAFFEINE 200 MG
200 TABLET ORAL
Status: DISCONTINUED | OUTPATIENT
Start: 2024-02-21 | End: 2024-02-21 | Stop reason: HOSPADM

## 2024-02-21 RX ADMIN — Medication 30.2 MILLICURIE: at 09:15

## 2024-02-21 RX ADMIN — Medication 8.06 MILLICURIE: at 08:37

## 2024-02-21 RX ADMIN — REGADENOSON 0.4 MG: 0.08 INJECTION, SOLUTION INTRAVENOUS at 09:15

## 2024-03-28 ENCOUNTER — LAB REQUISITION (OUTPATIENT)
Dept: LAB | Facility: CLINIC | Age: 69
End: 2024-03-28

## 2024-03-28 DIAGNOSIS — M85.80 OTHER SPECIFIED DISORDERS OF BONE DENSITY AND STRUCTURE, UNSPECIFIED SITE: ICD-10-CM

## 2024-03-28 DIAGNOSIS — E78.5 HYPERLIPIDEMIA, UNSPECIFIED: ICD-10-CM

## 2024-03-28 DIAGNOSIS — E03.9 HYPOTHYROIDISM, UNSPECIFIED: ICD-10-CM

## 2024-03-28 PROCEDURE — 80061 LIPID PANEL: CPT | Performed by: FAMILY MEDICINE

## 2024-03-28 PROCEDURE — 84439 ASSAY OF FREE THYROXINE: CPT | Performed by: FAMILY MEDICINE

## 2024-03-28 PROCEDURE — 82306 VITAMIN D 25 HYDROXY: CPT | Performed by: FAMILY MEDICINE

## 2024-03-28 PROCEDURE — 84443 ASSAY THYROID STIM HORMONE: CPT | Performed by: FAMILY MEDICINE

## 2024-03-29 LAB
CHOLEST SERPL-MCNC: 125 MG/DL
FASTING STATUS PATIENT QL REPORTED: NORMAL
HDLC SERPL-MCNC: 51 MG/DL
LDLC SERPL CALC-MCNC: 60 MG/DL
NONHDLC SERPL-MCNC: 74 MG/DL
T4 FREE SERPL-MCNC: 1.6 NG/DL (ref 0.9–1.7)
TRIGL SERPL-MCNC: 71 MG/DL
TSH SERPL DL<=0.005 MIU/L-ACNC: 0.25 UIU/ML (ref 0.3–4.2)
VIT D+METAB SERPL-MCNC: 54 NG/ML (ref 20–50)

## 2024-05-11 ENCOUNTER — HEALTH MAINTENANCE LETTER (OUTPATIENT)
Age: 69
End: 2024-05-11

## 2024-05-20 ENCOUNTER — ANCILLARY PROCEDURE (OUTPATIENT)
Dept: CARDIOLOGY | Facility: CLINIC | Age: 69
End: 2024-05-20
Attending: INTERNAL MEDICINE
Payer: COMMERCIAL

## 2024-05-20 DIAGNOSIS — I49.5 SICK SINUS SYNDROME (H): ICD-10-CM

## 2024-05-20 DIAGNOSIS — Z95.0 CARDIAC PACEMAKER IN SITU: ICD-10-CM

## 2024-05-20 LAB
MDC_IDC_EPISODE_DTM: NORMAL
MDC_IDC_EPISODE_DURATION: 1 S
MDC_IDC_EPISODE_ID: NORMAL
MDC_IDC_EPISODE_TYPE: NORMAL
MDC_IDC_LEAD_CONNECTION_STATUS: NORMAL
MDC_IDC_LEAD_CONNECTION_STATUS: NORMAL
MDC_IDC_LEAD_IMPLANT_DT: NORMAL
MDC_IDC_LEAD_IMPLANT_DT: NORMAL
MDC_IDC_LEAD_LOCATION: NORMAL
MDC_IDC_LEAD_LOCATION: NORMAL
MDC_IDC_LEAD_LOCATION_DETAIL_1: NORMAL
MDC_IDC_LEAD_LOCATION_DETAIL_1: NORMAL
MDC_IDC_LEAD_MFG: NORMAL
MDC_IDC_LEAD_MFG: NORMAL
MDC_IDC_LEAD_MODEL: NORMAL
MDC_IDC_LEAD_MODEL: NORMAL
MDC_IDC_LEAD_POLARITY_TYPE: NORMAL
MDC_IDC_LEAD_POLARITY_TYPE: NORMAL
MDC_IDC_LEAD_SERIAL: NORMAL
MDC_IDC_LEAD_SERIAL: NORMAL
MDC_IDC_MSMT_BATTERY_DTM: NORMAL
MDC_IDC_MSMT_BATTERY_REMAINING_LONGEVITY: 126 MO
MDC_IDC_MSMT_BATTERY_REMAINING_PERCENTAGE: 100 %
MDC_IDC_MSMT_BATTERY_STATUS: NORMAL
MDC_IDC_MSMT_LEADCHNL_RA_IMPEDANCE_VALUE: 505 OHM
MDC_IDC_MSMT_LEADCHNL_RA_PACING_THRESHOLD_AMPLITUDE: 0.6 V
MDC_IDC_MSMT_LEADCHNL_RA_PACING_THRESHOLD_PULSEWIDTH: 0.4 MS
MDC_IDC_MSMT_LEADCHNL_RV_IMPEDANCE_VALUE: 539 OHM
MDC_IDC_MSMT_LEADCHNL_RV_PACING_THRESHOLD_AMPLITUDE: 1.2 V
MDC_IDC_MSMT_LEADCHNL_RV_PACING_THRESHOLD_PULSEWIDTH: 0.4 MS
MDC_IDC_PG_IMPLANT_DTM: NORMAL
MDC_IDC_PG_MFG: NORMAL
MDC_IDC_PG_MODEL: NORMAL
MDC_IDC_PG_SERIAL: NORMAL
MDC_IDC_PG_TYPE: NORMAL
MDC_IDC_SESS_CLINIC_NAME: NORMAL
MDC_IDC_SESS_DTM: NORMAL
MDC_IDC_SESS_TYPE: NORMAL
MDC_IDC_SET_BRADY_AT_MODE_SWITCH_MODE: NORMAL
MDC_IDC_SET_BRADY_AT_MODE_SWITCH_RATE: 170 {BEATS}/MIN
MDC_IDC_SET_BRADY_LOWRATE: 60 {BEATS}/MIN
MDC_IDC_SET_BRADY_MAX_SENSOR_RATE: 130 {BEATS}/MIN
MDC_IDC_SET_BRADY_MAX_TRACKING_RATE: 130 {BEATS}/MIN
MDC_IDC_SET_BRADY_MODE: NORMAL
MDC_IDC_SET_BRADY_PAV_DELAY_HIGH: 150 MS
MDC_IDC_SET_BRADY_PAV_DELAY_LOW: 200 MS
MDC_IDC_SET_BRADY_SAV_DELAY_HIGH: 150 MS
MDC_IDC_SET_BRADY_SAV_DELAY_LOW: 200 MS
MDC_IDC_SET_LEADCHNL_RA_PACING_AMPLITUDE: 2 V
MDC_IDC_SET_LEADCHNL_RA_PACING_CAPTURE_MODE: NORMAL
MDC_IDC_SET_LEADCHNL_RA_PACING_POLARITY: NORMAL
MDC_IDC_SET_LEADCHNL_RA_PACING_PULSEWIDTH: 0.4 MS
MDC_IDC_SET_LEADCHNL_RA_SENSING_ADAPTATION_MODE: NORMAL
MDC_IDC_SET_LEADCHNL_RA_SENSING_POLARITY: NORMAL
MDC_IDC_SET_LEADCHNL_RA_SENSING_SENSITIVITY: 0.25 MV
MDC_IDC_SET_LEADCHNL_RV_PACING_AMPLITUDE: 1.7 V
MDC_IDC_SET_LEADCHNL_RV_PACING_CAPTURE_MODE: NORMAL
MDC_IDC_SET_LEADCHNL_RV_PACING_POLARITY: NORMAL
MDC_IDC_SET_LEADCHNL_RV_PACING_PULSEWIDTH: 0.4 MS
MDC_IDC_SET_LEADCHNL_RV_SENSING_ADAPTATION_MODE: NORMAL
MDC_IDC_SET_LEADCHNL_RV_SENSING_POLARITY: NORMAL
MDC_IDC_SET_LEADCHNL_RV_SENSING_SENSITIVITY: 1.5 MV
MDC_IDC_SET_ZONE_DETECTION_INTERVAL: 375 MS
MDC_IDC_SET_ZONE_STATUS: NORMAL
MDC_IDC_SET_ZONE_TYPE: NORMAL
MDC_IDC_SET_ZONE_VENDOR_TYPE: NORMAL
MDC_IDC_STAT_AT_BURDEN_PERCENT: 1 %
MDC_IDC_STAT_AT_DTM_END: NORMAL
MDC_IDC_STAT_AT_DTM_START: NORMAL
MDC_IDC_STAT_BRADY_DTM_END: NORMAL
MDC_IDC_STAT_BRADY_DTM_START: NORMAL
MDC_IDC_STAT_BRADY_RA_PERCENT_PACED: 3 %
MDC_IDC_STAT_BRADY_RV_PERCENT_PACED: 97 %
MDC_IDC_STAT_EPISODE_RECENT_COUNT: 0
MDC_IDC_STAT_EPISODE_RECENT_COUNT: 1
MDC_IDC_STAT_EPISODE_RECENT_COUNT_DTM_END: NORMAL
MDC_IDC_STAT_EPISODE_RECENT_COUNT_DTM_START: NORMAL
MDC_IDC_STAT_EPISODE_TYPE: NORMAL
MDC_IDC_STAT_EPISODE_VENDOR_TYPE: NORMAL

## 2024-05-20 PROCEDURE — 93294 REM INTERROG EVL PM/LDLS PM: CPT | Performed by: INTERNAL MEDICINE

## 2024-05-20 PROCEDURE — 93296 REM INTERROG EVL PM/IDS: CPT | Performed by: INTERNAL MEDICINE

## 2024-06-27 ENCOUNTER — LAB REQUISITION (OUTPATIENT)
Dept: LAB | Facility: CLINIC | Age: 69
End: 2024-06-27

## 2024-06-27 DIAGNOSIS — E03.9 HYPOTHYROIDISM, UNSPECIFIED: ICD-10-CM

## 2024-06-27 LAB
T4 FREE SERPL-MCNC: 1.37 NG/DL (ref 0.9–1.7)
TSH SERPL DL<=0.005 MIU/L-ACNC: 0.23 UIU/ML (ref 0.3–4.2)

## 2024-06-27 PROCEDURE — 84439 ASSAY OF FREE THYROXINE: CPT | Performed by: FAMILY MEDICINE

## 2024-06-27 PROCEDURE — 84443 ASSAY THYROID STIM HORMONE: CPT | Performed by: FAMILY MEDICINE

## 2024-08-19 ENCOUNTER — TELEPHONE (OUTPATIENT)
Dept: CARDIOLOGY | Facility: CLINIC | Age: 69
End: 2024-08-19
Payer: COMMERCIAL

## 2024-08-20 NOTE — TELEPHONE ENCOUNTER
"Noted per Dr. Alvarez's 2-20-24 visit note \"Chest pain: Somewhat atypical. Given risk factors will recommend Lexiscan nuclear stress test for further evaluation.\"    Per 2-21-24 Lexiscan nuc results note:  Stress test negative for inducible ischemia, no new recommendations at this time.  Cary Alvarez MD  2/28/2024  1:37 PM CST  "
"Return call to patient who reported that she has \"CP\" daily for past 3wks which could be related to \"heartburn\" - patient explained that pain is different then what she experienced when last seen by Dr. Alvarez, occurs several times / day at rest and with activities when her stomach is usually empty - patient has not tried any OTC tx for reflux \"because it can be hard on the kidneys\".    Explained to patient that most recent heart test did not show any signs of blood flow issues and instructed her to follow-up with PCP to determine if sx related to GERD - reassured patient that update would also be forwarded to Dr. Alvarez for any additional recommendations - understanding verbalized.  mg  "
[facility] Call Center    Phone Message    May a detailed message be left on voicemail: yes     Reason for Call: Symptoms or Concerns     If patient has red-flag symptoms, warm transfer to triage line    Current symptom or concern: Chest pains    Symptoms have been present for:  2-3 week(s)    Has patient previously been seen for this? Yes    By : DR Alvarez    Date: 2/202/4    Are there any new or worsening symptoms? Yes:     Action Taken: Other: cardio    Travel Screening: Not Applicable    Thank you!  [facility]       Date of Service:                                                                     
Msg rec'd [phone] @ 1550:  Cary Alvarez MD Gorshe, Maureen, RN  Agree on PCP. Can offer a follow up with us if she would like depending on PCP visit    Response noted - no further action needed at this time.  mg  
Please review patient update r.e. sx of CP vs heartburn - yrly follow-up pending 2/2025 - any new orders at this time?  mg  
regular

## 2024-09-03 ENCOUNTER — ANCILLARY PROCEDURE (OUTPATIENT)
Dept: CARDIOLOGY | Facility: CLINIC | Age: 69
End: 2024-09-03
Attending: INTERNAL MEDICINE
Payer: COMMERCIAL

## 2024-09-03 DIAGNOSIS — I44.2 COMPLETE ATRIOVENTRICULAR BLOCK (H): ICD-10-CM

## 2024-09-03 DIAGNOSIS — Z95.0 PACEMAKER: ICD-10-CM

## 2024-09-03 DIAGNOSIS — I49.5 SICK SINUS SYNDROME (H): ICD-10-CM

## 2024-09-29 LAB
MDC_IDC_EPISODE_DTM: NORMAL
MDC_IDC_EPISODE_DURATION: 2 S
MDC_IDC_EPISODE_DURATION: 3 S
MDC_IDC_EPISODE_DURATION: 3 S
MDC_IDC_EPISODE_ID: NORMAL
MDC_IDC_EPISODE_TYPE: NORMAL
MDC_IDC_LEAD_CONNECTION_STATUS: NORMAL
MDC_IDC_LEAD_CONNECTION_STATUS: NORMAL
MDC_IDC_LEAD_IMPLANT_DT: NORMAL
MDC_IDC_LEAD_IMPLANT_DT: NORMAL
MDC_IDC_LEAD_LOCATION: NORMAL
MDC_IDC_LEAD_LOCATION: NORMAL
MDC_IDC_LEAD_LOCATION_DETAIL_1: NORMAL
MDC_IDC_LEAD_LOCATION_DETAIL_1: NORMAL
MDC_IDC_LEAD_MFG: NORMAL
MDC_IDC_LEAD_MFG: NORMAL
MDC_IDC_LEAD_MODEL: NORMAL
MDC_IDC_LEAD_MODEL: NORMAL
MDC_IDC_LEAD_POLARITY_TYPE: NORMAL
MDC_IDC_LEAD_POLARITY_TYPE: NORMAL
MDC_IDC_LEAD_SERIAL: NORMAL
MDC_IDC_LEAD_SERIAL: NORMAL
MDC_IDC_MSMT_BATTERY_DTM: NORMAL
MDC_IDC_MSMT_BATTERY_REMAINING_LONGEVITY: 120 MO
MDC_IDC_MSMT_BATTERY_REMAINING_PERCENTAGE: 100 %
MDC_IDC_MSMT_BATTERY_STATUS: NORMAL
MDC_IDC_MSMT_LEADCHNL_RA_IMPEDANCE_VALUE: 518 OHM
MDC_IDC_MSMT_LEADCHNL_RA_PACING_THRESHOLD_AMPLITUDE: 0.6 V
MDC_IDC_MSMT_LEADCHNL_RA_PACING_THRESHOLD_PULSEWIDTH: 0.4 MS
MDC_IDC_MSMT_LEADCHNL_RV_IMPEDANCE_VALUE: 544 OHM
MDC_IDC_MSMT_LEADCHNL_RV_PACING_THRESHOLD_AMPLITUDE: 1.3 V
MDC_IDC_MSMT_LEADCHNL_RV_PACING_THRESHOLD_PULSEWIDTH: 0.4 MS
MDC_IDC_PG_IMPLANT_DTM: NORMAL
MDC_IDC_PG_MFG: NORMAL
MDC_IDC_PG_MODEL: NORMAL
MDC_IDC_PG_SERIAL: NORMAL
MDC_IDC_PG_TYPE: NORMAL
MDC_IDC_SESS_CLINIC_NAME: NORMAL
MDC_IDC_SESS_DTM: NORMAL
MDC_IDC_SESS_TYPE: NORMAL
MDC_IDC_SET_BRADY_AT_MODE_SWITCH_MODE: NORMAL
MDC_IDC_SET_BRADY_AT_MODE_SWITCH_RATE: 170 {BEATS}/MIN
MDC_IDC_SET_BRADY_LOWRATE: 60 {BEATS}/MIN
MDC_IDC_SET_BRADY_MAX_SENSOR_RATE: 130 {BEATS}/MIN
MDC_IDC_SET_BRADY_MAX_TRACKING_RATE: 130 {BEATS}/MIN
MDC_IDC_SET_BRADY_MODE: NORMAL
MDC_IDC_SET_BRADY_PAV_DELAY_HIGH: 150 MS
MDC_IDC_SET_BRADY_PAV_DELAY_LOW: 200 MS
MDC_IDC_SET_BRADY_SAV_DELAY_HIGH: 150 MS
MDC_IDC_SET_BRADY_SAV_DELAY_LOW: 200 MS
MDC_IDC_SET_LEADCHNL_RA_PACING_AMPLITUDE: 2 V
MDC_IDC_SET_LEADCHNL_RA_PACING_CAPTURE_MODE: NORMAL
MDC_IDC_SET_LEADCHNL_RA_PACING_POLARITY: NORMAL
MDC_IDC_SET_LEADCHNL_RA_PACING_PULSEWIDTH: 0.4 MS
MDC_IDC_SET_LEADCHNL_RA_SENSING_ADAPTATION_MODE: NORMAL
MDC_IDC_SET_LEADCHNL_RA_SENSING_POLARITY: NORMAL
MDC_IDC_SET_LEADCHNL_RA_SENSING_SENSITIVITY: 0.25 MV
MDC_IDC_SET_LEADCHNL_RV_PACING_AMPLITUDE: 1.5 V
MDC_IDC_SET_LEADCHNL_RV_PACING_CAPTURE_MODE: NORMAL
MDC_IDC_SET_LEADCHNL_RV_PACING_POLARITY: NORMAL
MDC_IDC_SET_LEADCHNL_RV_PACING_PULSEWIDTH: 0.4 MS
MDC_IDC_SET_LEADCHNL_RV_SENSING_ADAPTATION_MODE: NORMAL
MDC_IDC_SET_LEADCHNL_RV_SENSING_POLARITY: NORMAL
MDC_IDC_SET_LEADCHNL_RV_SENSING_SENSITIVITY: 1.5 MV
MDC_IDC_SET_ZONE_DETECTION_INTERVAL: 375 MS
MDC_IDC_SET_ZONE_STATUS: NORMAL
MDC_IDC_SET_ZONE_TYPE: NORMAL
MDC_IDC_SET_ZONE_VENDOR_TYPE: NORMAL
MDC_IDC_STAT_AT_BURDEN_PERCENT: 1 %
MDC_IDC_STAT_AT_DTM_END: NORMAL
MDC_IDC_STAT_AT_DTM_START: NORMAL
MDC_IDC_STAT_BRADY_DTM_END: NORMAL
MDC_IDC_STAT_BRADY_DTM_START: NORMAL
MDC_IDC_STAT_BRADY_RA_PERCENT_PACED: 3 %
MDC_IDC_STAT_BRADY_RV_PERCENT_PACED: 97 %
MDC_IDC_STAT_EPISODE_RECENT_COUNT: 0
MDC_IDC_STAT_EPISODE_RECENT_COUNT: 3
MDC_IDC_STAT_EPISODE_RECENT_COUNT_DTM_END: NORMAL
MDC_IDC_STAT_EPISODE_RECENT_COUNT_DTM_START: NORMAL
MDC_IDC_STAT_EPISODE_TYPE: NORMAL
MDC_IDC_STAT_EPISODE_VENDOR_TYPE: NORMAL

## 2024-09-29 PROCEDURE — 93296 REM INTERROG EVL PM/IDS: CPT | Performed by: INTERNAL MEDICINE

## 2024-09-29 PROCEDURE — 93294 REM INTERROG EVL PM/LDLS PM: CPT | Performed by: INTERNAL MEDICINE

## 2024-10-31 ENCOUNTER — LAB REQUISITION (OUTPATIENT)
Dept: LAB | Facility: CLINIC | Age: 69
End: 2024-10-31

## 2024-10-31 ENCOUNTER — TRANSFERRED RECORDS (OUTPATIENT)
Dept: HEALTH INFORMATION MANAGEMENT | Facility: CLINIC | Age: 69
End: 2024-10-31

## 2024-10-31 DIAGNOSIS — E03.9 HYPOTHYROIDISM, UNSPECIFIED: ICD-10-CM

## 2024-10-31 PROCEDURE — 84443 ASSAY THYROID STIM HORMONE: CPT | Performed by: FAMILY MEDICINE

## 2024-11-01 ENCOUNTER — TELEPHONE (OUTPATIENT)
Dept: CARDIOLOGY | Facility: CLINIC | Age: 69
End: 2024-11-01
Payer: COMMERCIAL

## 2024-11-01 DIAGNOSIS — I25.119 CORONARY ARTERY DISEASE INVOLVING NATIVE CORONARY ARTERY OF NATIVE HEART WITH ANGINA PECTORIS (H): Primary | ICD-10-CM

## 2024-11-01 DIAGNOSIS — R07.9 CHEST PAIN: ICD-10-CM

## 2024-11-01 LAB — TSH SERPL DL<=0.005 MIU/L-ACNC: 0.61 UIU/ML (ref 0.3–4.2)

## 2024-11-01 NOTE — TELEPHONE ENCOUNTER
[facility] Call Center    Phone Message    May a detailed message be left on voicemail: yes     Reason for Call: Symptoms or Concerns     If patient has red-flag symptoms, warm transfer to triage line    Current symptom or concern: on going chest pain    Symptoms have been present for:  couple of week(s)    Has patient previously been seen for this? Yes    By: Antonio     Date: 2/22/24    Are there any new or worsening symptoms? Yes: Patient stats that is has been on going for weeks. Patient did not want triage    Action Taken: Other: cardiology     Travel Screening: Not Applicable    Thank you!  [facility]      Date of Service:

## 2024-11-04 NOTE — TELEPHONE ENCOUNTER
----- Message -----  From: Cary Alvarez MD  Sent: 11/1/2024   4:39 PM CST  To: Juliet Aguilar RN    Can proceed with a CT coronary angiogram for further evaluation of cardiac cause of chest pain    ----- Message -----  From: Juliet Aguilar RN  Sent: 11/1/2024   3:10 PM CDT  To: Cary Alvarez MD    Please see pt symptom update. Continues to have chest pains with difficulty describing. CP vs heartburn. Hasn't followed up with PCP as was previously recommended 8/19/24. Pt has follow-up pending with you 1/22/25. Any other recommendations? LMS    ==  CT coronary angiogram placed per order. Left message requesting callback to review response/recommendations. Notified pt would also receive response/recommendations via [employer]. LMS

## 2024-11-05 NOTE — TELEPHONE ENCOUNTER
"Noted [employer] message: \"Last read by Tameka Avalos at 12:13 PM on 11/4/2024.\". Noted CT coronary angiogram scheduled pending 12/19/24. Follow-up with [facility] pending 1/22/25. LMS  "

## 2024-12-12 ENCOUNTER — OFFICE VISIT (OUTPATIENT)
Dept: CARDIOLOGY | Facility: CLINIC | Age: 69
End: 2024-12-12
Payer: COMMERCIAL

## 2024-12-12 VITALS
BODY MASS INDEX: 31.28 KG/M2 | RESPIRATION RATE: 12 BRPM | SYSTOLIC BLOOD PRESSURE: 134 MMHG | HEIGHT: 62 IN | WEIGHT: 170 LBS | DIASTOLIC BLOOD PRESSURE: 84 MMHG | HEART RATE: 96 BPM

## 2024-12-12 DIAGNOSIS — R07.89 OTHER CHEST PAIN: ICD-10-CM

## 2024-12-12 DIAGNOSIS — I25.119 CORONARY ARTERY DISEASE INVOLVING NATIVE CORONARY ARTERY OF NATIVE HEART WITH ANGINA PECTORIS (H): ICD-10-CM

## 2024-12-12 DIAGNOSIS — I44.1 SECOND DEGREE HEART BLOCK: ICD-10-CM

## 2024-12-12 RX ORDER — LEVOTHYROXINE SODIUM 75 UG/1
75 TABLET ORAL DAILY
COMMUNITY
Start: 2024-11-11

## 2024-12-12 RX ORDER — FERROUS SULFATE 325(65) MG
325 TABLET, DELAYED RELEASE (ENTERIC COATED) ORAL DAILY
COMMUNITY
Start: 2024-11-12

## 2024-12-12 RX ORDER — HYDROCHLOROTHIAZIDE 12.5 MG/1
12.5 TABLET ORAL DAILY
COMMUNITY
Start: 2024-11-04

## 2024-12-12 NOTE — PROGRESS NOTES
HEART CARE ENCOUNTER CONSULTATON NOTE      [facility]  [phone]      Assessment/Recommendations   Assessment:  1.  Chest pain: Recurring chest pain described as a pressure.  Stress testing done recently was unremarkable.  Will recommend CT coronary angiogram for further evaluation.  2.  Dyslipidemia: Well-controlled on statin therapy  3.  Hypertension: Well-controlled  4.  Second-degree heart block status post pacemaker placement in 2021  5.  Hypothyroidism     Plan:  1.  CT coronary angiogram  2.  Continue on statin therapy and current antihypertensive  3.  Remote device checks every 3 months and follow-up with me with in person device check in 1 year         History of Present Illness/Subjective    HPI: Tameka Avalos is a 69 year old female with history of dyslipidemia, hypothyroidism, hypertension and second-degree type II heart block status post pacemaker placement in 2021 who I am seeing today for follow-up.  When I last saw her about a year ago she was noting some intermittent chest discomfort.  Lexiscan nuclear stress test was unremarkable.  She has recently noticed recurrent chest pain.  She describes it as a chest pressure over the mid chest area.  It is nontender nonpleuritic.  It occurs randomly.  It occurs both at rest and with exertion.   Pacemaker check today looks great.  No significant events.     Echocardiogram 2/6/2021  Possible advanced AV block.  Left ventricular systolic function is normal.  The visual ejection fraction is estimated at 60-65%.  The left ventricle is normal in size.  The right ventricle is normal in structure, function and size.  There is trace to mild mitral regurgitation.     No old studies available for comparison.    The longitudinal plan of care for the diagnosis(es)/condition(s) as documented were addressed during this visit. Due to the added complexity in care, I will continue to support Tameka in the subsequent management and with ongoing  "continuity of care.      Physical Examination  Review of Systems   Vitals: /84 (BP Location: Right arm, Patient Position: Sitting, Cuff Size: Adult Large)   Pulse 96   Resp 12   Ht 1.575 m (5' 2\")   Wt 77.1 kg (170 lb)   BMI 31.09 kg/m    BMI= Body mass index is 31.09 kg/m .  Wt Readings from Last 3 Encounters:   12/12/24 77.1 kg (170 lb)   02/20/24 76.2 kg (168 lb)   06/05/23 76.7 kg (169 lb)       General Appearance:   no distress, normal body habitus   ENT/Mouth: membranes moist, no oral lesions or bleeding gums.      EYES:  no scleral icterus, normal conjunctivae   Neck: no carotid bruits or thyromegaly   Chest/Lungs:   lungs are clear to auscultation   Cardiovascular:   Regular. Normal first and second heart sounds with no murmur no edema bilaterally    Abdomen:  bowel sounds are present   Extremities: no cyanosis or clubbing   Skin: no xanthelasma, warm.    Neurologic: normal  bilateral, no tremors     Psychiatric: alert and oriented x3, calm        Please refer above for cardiac ROS details.        Medical History  Surgical History Family History Social History   Past Medical History:   Diagnosis Date    Chronic kidney disease     Hyperlipidemia     Hypertension     Thyroid disease      Past Surgical History:   Procedure Laterality Date    CHOLECYSTECTOMY  06/05/2023    EP PACEMAKER Left 02/08/2021    Procedure: EP Pacemaker;  Surgeon: Steven Lozano MD;  Location: [facility] CARDIAC CATH LAB    IMPLANT PACEMAKER      LAPAROSCOPIC CHOLECYSTECTOMY N/A 6/5/2023    Procedure: CHOLECYSTECTOMY, LAPAROSCOPIC;  Surgeon: Giacomo Handley DO;  Location: [facility] OR     No family history on file.     Social History     Socioeconomic History    Marital status:      Spouse name: Not on file    Number of children: Not on file    Years of education: Not on file    Highest education level: Not on file   Occupational History    Not on file   Tobacco Use    Smoking status: Never    " "Smokeless tobacco: Never   Vaping Use    Vaping status: Never Used   Substance and Sexual Activity    Alcohol use: Not on file    Drug use: Not on file    Sexual activity: Not on file   Other Topics Concern    Not on file   Social History Narrative    Not on file     Social Drivers of Health     Financial Resource Strain: Not on file   Food Insecurity: Not on file   Transportation Needs: Not on file   Physical Activity: Not on file   Stress: Not on file   Social Connections: Not on file   Interpersonal Safety: Not on file   Housing Stability: Not on file           Medications  Allergies   Current Outpatient Medications   Medication Sig Dispense Refill    atorvastatin (LIPITOR) 40 MG tablet Take 1 tablet (40 mg) by mouth daily 90 tablet 0    ferrous sulfate (FE TABS) 325 (65 Fe) MG EC tablet Take 325 mg by mouth daily.      hydroCHLOROthiazide 12.5 MG tablet Take 12.5 mg by mouth daily.      levothyroxine (SYNTHROID/LEVOTHROID) 75 MCG tablet Take 75 mcg by mouth daily.      losartan (COZAAR) 50 MG tablet Take 50 mg by mouth daily      Multiple vitamin TABS Take 1 tablet by mouth daily.       No Known Allergies       Lab Results    Chemistry/lipid CBC Cardiac Enzymes/BNP/TSH/INR   Recent Labs   Lab Test 03/28/24  1107   CHOL 125   HDL 51   LDL 60   TRIG 71     Recent Labs   Lab Test 03/28/24  1107 02/21/23  1155 02/08/23  1145   LDL 60 77 60     Recent Labs   Lab Test 07/03/23  1237      POTASSIUM 4.5   CHLORIDE 109*   CO2 23   GLC 85   BUN 30.4*   CR 1.46*   GFRESTIMATED 39*   BISI 9.5     Recent Labs   Lab Test 07/03/23  1237 06/06/23  0617 06/05/23  1657   CR 1.46* 1.30* 1.28*     No results for input(s): \"A1C\" in the last 22278 hours.       Recent Labs   Lab Test 07/03/23  1237 06/06/23  0617   WBC  --  15.5*   HGB 10.6* 10.9*   HCT  --  34.5*   MCV  --  82   PLT  --  355     Recent Labs   Lab Test 07/03/23  1237 06/06/23  0617 06/05/23  0534   HGB 10.6* 10.9* 11.3*    Recent Labs   Lab Test 02/05/21  1653 " "  TROPONINI <0.01     No results for input(s): \"BNP\", \"NTBNPI\", \"NTBNP\" in the last 70329 hours.  Recent Labs   Lab Test 10/31/24  1007   TSH 0.61     Recent Labs   Lab Test 02/07/21  1923   INR 1.04        Cary Alvarez MD                                        "

## 2024-12-12 NOTE — LETTER
12/12/2024    Eve Brewer MD  [address]  [place] [address]    RE: Tameka Avalos       Dear Colleague,     I had the pleasure of seeing Tameka Avalos in the [facility].    HEART CARE ENCOUNTER CONSULTATON NOTE      [facility]  [phone]      Assessment/Recommendations   Assessment:  1.  Chest pain: Recurring chest pain described as a pressure.  Stress testing done recently was unremarkable.  Will recommend CT coronary angiogram for further evaluation.  2.  Dyslipidemia: Well-controlled on statin therapy  3.  Hypertension: Well-controlled  4.  Second-degree heart block status post pacemaker placement in 2021  5.  Hypothyroidism     Plan:  1.  CT coronary angiogram  2.  Continue on statin therapy and current antihypertensive  3.  Remote device checks every 3 months and follow-up with me with in person device check in 1 year         History of Present Illness/Subjective    HPI: Tameka Avalos is a 69 year old female with history of dyslipidemia, hypothyroidism, hypertension and second-degree type II heart block status post pacemaker placement in 2021 who I am seeing today for follow-up.  When I last saw her about a year ago she was noting some intermittent chest discomfort.  Lexiscan nuclear stress test was unremarkable.  She has recently noticed recurrent chest pain.  She describes it as a chest pressure over the mid chest area.  It is nontender nonpleuritic.  It occurs randomly.  It occurs both at rest and with exertion.   Pacemaker check today looks great.  No significant events.     Echocardiogram 2/6/2021  Possible advanced AV block.  Left ventricular systolic function is normal.  The visual ejection fraction is estimated at 60-65%.  The left ventricle is normal in size.  The right ventricle is normal in structure, function and size.  There is trace to mild mitral regurgitation.     No old studies available for comparison.    The longitudinal  "plan of care for the diagnosis(es)/condition(s) as documented were addressed during this visit. Due to the added complexity in care, I will continue to support Tameka in the subsequent management and with ongoing continuity of care.      Physical Examination  Review of Systems   Vitals: /84 (BP Location: Right arm, Patient Position: Sitting, Cuff Size: Adult Large)   Pulse 96   Resp 12   Ht 1.575 m (5' 2\")   Wt 77.1 kg (170 lb)   BMI 31.09 kg/m    BMI= Body mass index is 31.09 kg/m .  Wt Readings from Last 3 Encounters:   12/12/24 77.1 kg (170 lb)   02/20/24 76.2 kg (168 lb)   06/05/23 76.7 kg (169 lb)       General Appearance:   no distress, normal body habitus   ENT/Mouth: membranes moist, no oral lesions or bleeding gums.      EYES:  no scleral icterus, normal conjunctivae   Neck: no carotid bruits or thyromegaly   Chest/Lungs:   lungs are clear to auscultation   Cardiovascular:   Regular. Normal first and second heart sounds with no murmur no edema bilaterally    Abdomen:  bowel sounds are present   Extremities: no cyanosis or clubbing   Skin: no xanthelasma, warm.    Neurologic: normal  bilateral, no tremors     Psychiatric: alert and oriented x3, calm        Please refer above for cardiac ROS details.        Medical History  Surgical History Family History Social History   Past Medical History:   Diagnosis Date     Chronic kidney disease      Hyperlipidemia      Hypertension      Thyroid disease      Past Surgical History:   Procedure Laterality Date     CHOLECYSTECTOMY  06/05/2023     EP PACEMAKER Left 02/08/2021    Procedure: EP Pacemaker;  Surgeon: Steven Lozano MD;  Location: [facility] CARDIAC CATH LAB     IMPLANT PACEMAKER       LAPAROSCOPIC CHOLECYSTECTOMY N/A 6/5/2023    Procedure: CHOLECYSTECTOMY, LAPAROSCOPIC;  Surgeon: Giacomo Handley DO;  Location: [facility] OR     No family history on file.     Social History     Socioeconomic History     Marital status:      " "Spouse name: Not on file     Number of children: Not on file     Years of education: Not on file     Highest education level: Not on file   Occupational History     Not on file   Tobacco Use     Smoking status: Never     Smokeless tobacco: Never   Vaping Use     Vaping status: Never Used   Substance and Sexual Activity     Alcohol use: Not on file     Drug use: Not on file     Sexual activity: Not on file   Other Topics Concern     Not on file   Social History Narrative     Not on file     Social Drivers of Health     Financial Resource Strain: Not on file   Food Insecurity: Not on file   Transportation Needs: Not on file   Physical Activity: Not on file   Stress: Not on file   Social Connections: Not on file   Interpersonal Safety: Not on file   Housing Stability: Not on file           Medications  Allergies   Current Outpatient Medications   Medication Sig Dispense Refill     atorvastatin (LIPITOR) 40 MG tablet Take 1 tablet (40 mg) by mouth daily 90 tablet 0     ferrous sulfate (FE TABS) 325 (65 Fe) MG EC tablet Take 325 mg by mouth daily.       hydroCHLOROthiazide 12.5 MG tablet Take 12.5 mg by mouth daily.       levothyroxine (SYNTHROID/LEVOTHROID) 75 MCG tablet Take 75 mcg by mouth daily.       losartan (COZAAR) 50 MG tablet Take 50 mg by mouth daily       Multiple vitamin TABS Take 1 tablet by mouth daily.       No Known Allergies       Lab Results    Chemistry/lipid CBC Cardiac Enzymes/BNP/TSH/INR   Recent Labs   Lab Test 03/28/24  1107   CHOL 125   HDL 51   LDL 60   TRIG 71     Recent Labs   Lab Test 03/28/24  1107 02/21/23  1155 02/08/23  1145   LDL 60 77 60     Recent Labs   Lab Test 07/03/23  1237      POTASSIUM 4.5   CHLORIDE 109*   CO2 23   GLC 85   BUN 30.4*   CR 1.46*   GFRESTIMATED 39*   BISI 9.5     Recent Labs   Lab Test 07/03/23  1237 06/06/23  0617 06/05/23  1657   CR 1.46* 1.30* 1.28*     No results for input(s): \"A1C\" in the last 96574 hours.       Recent Labs   Lab Test 07/03/23  1237 " "06/06/23  0617   WBC  --  15.5*   HGB 10.6* 10.9*   HCT  --  34.5*   MCV  --  82   PLT  --  355     Recent Labs   Lab Test 07/03/23  1237 06/06/23  0617 06/05/23  0534   HGB 10.6* 10.9* 11.3*    Recent Labs   Lab Test 02/05/21  1653   TROPONINI <0.01     No results for input(s): \"BNP\", \"NTBNPI\", \"NTBNP\" in the last 49157 hours.  Recent Labs   Lab Test 10/31/24  1007   TSH 0.61     Recent Labs   Lab Test 02/07/21  1923   INR 1.04        Cary Alvarez MD                                          Thank you for allowing me to participate in the care of your patient.      Sincerely,     Cary Alvarez MD     [facility]  cc:   Cary Alvarez MD  [address]  [address]  [place] [address]      "

## 2025-03-20 ENCOUNTER — ANCILLARY PROCEDURE (OUTPATIENT)
Dept: CARDIOLOGY | Facility: CLINIC | Age: 70
End: 2025-03-20
Attending: INTERNAL MEDICINE
Payer: COMMERCIAL

## 2025-03-20 DIAGNOSIS — Z95.0 PACEMAKER: ICD-10-CM

## 2025-03-20 DIAGNOSIS — I49.5 SICK SINUS SYNDROME (H): ICD-10-CM

## 2025-03-20 DIAGNOSIS — I44.2 COMPLETE ATRIOVENTRICULAR BLOCK (H): ICD-10-CM

## 2025-03-20 LAB
MDC_IDC_EPISODE_DTM: NORMAL
MDC_IDC_EPISODE_DURATION: 10 S
MDC_IDC_EPISODE_DURATION: 4 S
MDC_IDC_EPISODE_ID: NORMAL
MDC_IDC_EPISODE_TYPE: NORMAL
MDC_IDC_LEAD_CONNECTION_STATUS: NORMAL
MDC_IDC_LEAD_CONNECTION_STATUS: NORMAL
MDC_IDC_LEAD_IMPLANT_DT: NORMAL
MDC_IDC_LEAD_IMPLANT_DT: NORMAL
MDC_IDC_LEAD_LOCATION: NORMAL
MDC_IDC_LEAD_LOCATION: NORMAL
MDC_IDC_LEAD_LOCATION_DETAIL_1: NORMAL
MDC_IDC_LEAD_LOCATION_DETAIL_1: NORMAL
MDC_IDC_LEAD_MFG: NORMAL
MDC_IDC_LEAD_MFG: NORMAL
MDC_IDC_LEAD_MODEL: NORMAL
MDC_IDC_LEAD_MODEL: NORMAL
MDC_IDC_LEAD_POLARITY_TYPE: NORMAL
MDC_IDC_LEAD_POLARITY_TYPE: NORMAL
MDC_IDC_LEAD_SERIAL: NORMAL
MDC_IDC_LEAD_SERIAL: NORMAL
MDC_IDC_MSMT_BATTERY_DTM: NORMAL
MDC_IDC_MSMT_BATTERY_REMAINING_LONGEVITY: 114 MO
MDC_IDC_MSMT_BATTERY_REMAINING_PERCENTAGE: 100 %
MDC_IDC_MSMT_BATTERY_STATUS: NORMAL
MDC_IDC_MSMT_LEADCHNL_RA_IMPEDANCE_VALUE: 537 OHM
MDC_IDC_MSMT_LEADCHNL_RA_PACING_THRESHOLD_AMPLITUDE: 0.5 V
MDC_IDC_MSMT_LEADCHNL_RA_PACING_THRESHOLD_PULSEWIDTH: 0.4 MS
MDC_IDC_MSMT_LEADCHNL_RV_IMPEDANCE_VALUE: 564 OHM
MDC_IDC_MSMT_LEADCHNL_RV_PACING_THRESHOLD_AMPLITUDE: 1.1 V
MDC_IDC_MSMT_LEADCHNL_RV_PACING_THRESHOLD_PULSEWIDTH: 0.4 MS
MDC_IDC_PG_IMPLANT_DTM: NORMAL
MDC_IDC_PG_MFG: NORMAL
MDC_IDC_PG_MODEL: NORMAL
MDC_IDC_PG_SERIAL: NORMAL
MDC_IDC_PG_TYPE: NORMAL
MDC_IDC_SESS_CLINIC_NAME: NORMAL
MDC_IDC_SESS_DTM: NORMAL
MDC_IDC_SESS_TYPE: NORMAL
MDC_IDC_SET_BRADY_AT_MODE_SWITCH_MODE: NORMAL
MDC_IDC_SET_BRADY_AT_MODE_SWITCH_RATE: 170 {BEATS}/MIN
MDC_IDC_SET_BRADY_LOWRATE: 60 {BEATS}/MIN
MDC_IDC_SET_BRADY_MAX_SENSOR_RATE: 130 {BEATS}/MIN
MDC_IDC_SET_BRADY_MAX_TRACKING_RATE: 130 {BEATS}/MIN
MDC_IDC_SET_BRADY_MODE: NORMAL
MDC_IDC_SET_BRADY_PAV_DELAY_HIGH: 150 MS
MDC_IDC_SET_BRADY_PAV_DELAY_LOW: 200 MS
MDC_IDC_SET_BRADY_SAV_DELAY_HIGH: 150 MS
MDC_IDC_SET_BRADY_SAV_DELAY_LOW: 200 MS
MDC_IDC_SET_LEADCHNL_RA_PACING_AMPLITUDE: 2 V
MDC_IDC_SET_LEADCHNL_RA_PACING_CAPTURE_MODE: NORMAL
MDC_IDC_SET_LEADCHNL_RA_PACING_POLARITY: NORMAL
MDC_IDC_SET_LEADCHNL_RA_PACING_PULSEWIDTH: 0.4 MS
MDC_IDC_SET_LEADCHNL_RA_SENSING_ADAPTATION_MODE: NORMAL
MDC_IDC_SET_LEADCHNL_RA_SENSING_POLARITY: NORMAL
MDC_IDC_SET_LEADCHNL_RA_SENSING_SENSITIVITY: 0.25 MV
MDC_IDC_SET_LEADCHNL_RV_PACING_AMPLITUDE: 1.9 V
MDC_IDC_SET_LEADCHNL_RV_PACING_CAPTURE_MODE: NORMAL
MDC_IDC_SET_LEADCHNL_RV_PACING_POLARITY: NORMAL
MDC_IDC_SET_LEADCHNL_RV_PACING_PULSEWIDTH: 0.4 MS
MDC_IDC_SET_LEADCHNL_RV_SENSING_ADAPTATION_MODE: NORMAL
MDC_IDC_SET_LEADCHNL_RV_SENSING_POLARITY: NORMAL
MDC_IDC_SET_LEADCHNL_RV_SENSING_SENSITIVITY: 1.5 MV
MDC_IDC_SET_ZONE_DETECTION_INTERVAL: 375 MS
MDC_IDC_SET_ZONE_STATUS: NORMAL
MDC_IDC_SET_ZONE_TYPE: NORMAL
MDC_IDC_SET_ZONE_VENDOR_TYPE: NORMAL
MDC_IDC_STAT_AT_BURDEN_PERCENT: 0 %
MDC_IDC_STAT_AT_DTM_END: NORMAL
MDC_IDC_STAT_AT_DTM_START: NORMAL
MDC_IDC_STAT_BRADY_DTM_END: NORMAL
MDC_IDC_STAT_BRADY_DTM_START: NORMAL
MDC_IDC_STAT_BRADY_RA_PERCENT_PACED: 5 %
MDC_IDC_STAT_BRADY_RV_PERCENT_PACED: 53 %
MDC_IDC_STAT_EPISODE_RECENT_COUNT: 0
MDC_IDC_STAT_EPISODE_RECENT_COUNT: 2
MDC_IDC_STAT_EPISODE_RECENT_COUNT_DTM_END: NORMAL
MDC_IDC_STAT_EPISODE_RECENT_COUNT_DTM_START: NORMAL
MDC_IDC_STAT_EPISODE_TYPE: NORMAL
MDC_IDC_STAT_EPISODE_VENDOR_TYPE: NORMAL

## 2025-03-21 PROCEDURE — 93296 REM INTERROG EVL PM/IDS: CPT | Performed by: INTERNAL MEDICINE

## 2025-03-21 PROCEDURE — 93294 REM INTERROG EVL PM/LDLS PM: CPT | Performed by: INTERNAL MEDICINE

## 2025-03-24 ENCOUNTER — LAB REQUISITION (OUTPATIENT)
Dept: LAB | Facility: CLINIC | Age: 70
End: 2025-03-24

## 2025-03-24 DIAGNOSIS — E78.5 HYPERLIPIDEMIA, UNSPECIFIED: ICD-10-CM

## 2025-03-24 DIAGNOSIS — E03.9 HYPOTHYROIDISM, UNSPECIFIED: ICD-10-CM

## 2025-03-24 PROCEDURE — 82374 ASSAY BLOOD CARBON DIOXIDE: CPT | Performed by: FAMILY MEDICINE

## 2025-03-24 PROCEDURE — 84443 ASSAY THYROID STIM HORMONE: CPT | Performed by: FAMILY MEDICINE

## 2025-03-24 PROCEDURE — 84155 ASSAY OF PROTEIN SERUM: CPT | Performed by: FAMILY MEDICINE

## 2025-03-24 PROCEDURE — 82465 ASSAY BLD/SERUM CHOLESTEROL: CPT | Performed by: FAMILY MEDICINE

## 2025-03-25 LAB
ALBUMIN SERPL BCG-MCNC: 3.5 G/DL (ref 3.5–5.2)
ALP SERPL-CCNC: 103 U/L (ref 40–150)
ALT SERPL W P-5'-P-CCNC: 65 U/L (ref 0–50)
ANION GAP SERPL CALCULATED.3IONS-SCNC: 9 MMOL/L (ref 7–15)
AST SERPL W P-5'-P-CCNC: 61 U/L (ref 0–45)
BILIRUB SERPL-MCNC: 0.4 MG/DL
BUN SERPL-MCNC: 27.6 MG/DL (ref 8–23)
CALCIUM SERPL-MCNC: 9.1 MG/DL (ref 8.8–10.4)
CHLORIDE SERPL-SCNC: 107 MMOL/L (ref 98–107)
CHOLEST SERPL-MCNC: 133 MG/DL
CREAT SERPL-MCNC: 1.5 MG/DL (ref 0.51–0.95)
EGFRCR SERPLBLD CKD-EPI 2021: 37 ML/MIN/1.73M2
FASTING STATUS PATIENT QL REPORTED: NO
FASTING STATUS PATIENT QL REPORTED: NO
GLUCOSE SERPL-MCNC: 156 MG/DL (ref 70–99)
HCO3 SERPL-SCNC: 25 MMOL/L (ref 22–29)
HDLC SERPL-MCNC: 46 MG/DL
LDLC SERPL CALC-MCNC: 57 MG/DL
NONHDLC SERPL-MCNC: 87 MG/DL
POTASSIUM SERPL-SCNC: 3.7 MMOL/L (ref 3.4–5.3)
PROT SERPL-MCNC: 6.2 G/DL (ref 6.4–8.3)
SODIUM SERPL-SCNC: 141 MMOL/L (ref 135–145)
TRIGL SERPL-MCNC: 149 MG/DL
TSH SERPL DL<=0.005 MIU/L-ACNC: 0.43 UIU/ML (ref 0.3–4.2)

## 2025-05-17 ENCOUNTER — HEALTH MAINTENANCE LETTER (OUTPATIENT)
Age: 70
End: 2025-05-17

## 2025-06-04 ENCOUNTER — LAB REQUISITION (OUTPATIENT)
Dept: LAB | Facility: CLINIC | Age: 70
End: 2025-06-04

## 2025-06-04 DIAGNOSIS — R74.8 ABNORMAL LEVELS OF OTHER SERUM ENZYMES: ICD-10-CM

## 2025-06-04 PROCEDURE — 82435 ASSAY OF BLOOD CHLORIDE: CPT | Performed by: FAMILY MEDICINE

## 2025-06-05 LAB
ALBUMIN SERPL BCG-MCNC: 3.4 G/DL (ref 3.5–5.2)
ALP SERPL-CCNC: 103 U/L (ref 40–150)
ALT SERPL W P-5'-P-CCNC: 40 U/L (ref 0–50)
ANION GAP SERPL CALCULATED.3IONS-SCNC: 9 MMOL/L (ref 7–15)
AST SERPL W P-5'-P-CCNC: 40 U/L (ref 0–45)
BILIRUB SERPL-MCNC: 0.3 MG/DL
BUN SERPL-MCNC: 23.1 MG/DL (ref 8–23)
CALCIUM SERPL-MCNC: 8.9 MG/DL (ref 8.8–10.4)
CHLORIDE SERPL-SCNC: 105 MMOL/L (ref 98–107)
CREAT SERPL-MCNC: 1.42 MG/DL (ref 0.51–0.95)
EGFRCR SERPLBLD CKD-EPI 2021: 39 ML/MIN/1.73M2
GLUCOSE SERPL-MCNC: 90 MG/DL (ref 70–99)
HCO3 SERPL-SCNC: 25 MMOL/L (ref 22–29)
POTASSIUM SERPL-SCNC: 4 MMOL/L (ref 3.4–5.3)
PROT SERPL-MCNC: 6.3 G/DL (ref 6.4–8.3)
SODIUM SERPL-SCNC: 139 MMOL/L (ref 135–145)

## 2025-06-30 ENCOUNTER — ANCILLARY PROCEDURE (OUTPATIENT)
Dept: CARDIOLOGY | Facility: CLINIC | Age: 70
End: 2025-06-30
Attending: INTERNAL MEDICINE
Payer: COMMERCIAL

## 2025-06-30 ENCOUNTER — TELEPHONE (OUTPATIENT)
Dept: CARDIOLOGY | Facility: CLINIC | Age: 70
End: 2025-06-30

## 2025-06-30 DIAGNOSIS — I44.2 COMPLETE ATRIOVENTRICULAR BLOCK (H): ICD-10-CM

## 2025-06-30 DIAGNOSIS — Z95.0 PACEMAKER: ICD-10-CM

## 2025-06-30 DIAGNOSIS — I49.5 SICK SINUS SYNDROME (H): ICD-10-CM

## 2025-06-30 LAB
MDC_IDC_EPISODE_DTM: NORMAL
MDC_IDC_EPISODE_DURATION: 1 S
MDC_IDC_EPISODE_DURATION: 18 S
MDC_IDC_EPISODE_ID: NORMAL
MDC_IDC_EPISODE_TYPE: NORMAL
MDC_IDC_EPISODE_TYPE_INDUCED: NO
MDC_IDC_LEAD_CONNECTION_STATUS: NORMAL
MDC_IDC_LEAD_CONNECTION_STATUS: NORMAL
MDC_IDC_LEAD_IMPLANT_DT: NORMAL
MDC_IDC_LEAD_IMPLANT_DT: NORMAL
MDC_IDC_LEAD_LOCATION: NORMAL
MDC_IDC_LEAD_LOCATION: NORMAL
MDC_IDC_LEAD_LOCATION_DETAIL_1: NORMAL
MDC_IDC_LEAD_LOCATION_DETAIL_1: NORMAL
MDC_IDC_LEAD_MFG: NORMAL
MDC_IDC_LEAD_MFG: NORMAL
MDC_IDC_LEAD_MODEL: NORMAL
MDC_IDC_LEAD_MODEL: NORMAL
MDC_IDC_LEAD_POLARITY_TYPE: NORMAL
MDC_IDC_LEAD_POLARITY_TYPE: NORMAL
MDC_IDC_LEAD_SERIAL: NORMAL
MDC_IDC_LEAD_SERIAL: NORMAL
MDC_IDC_MSMT_BATTERY_DTM: NORMAL
MDC_IDC_MSMT_BATTERY_REMAINING_LONGEVITY: 114 MO
MDC_IDC_MSMT_BATTERY_REMAINING_PERCENTAGE: 100 %
MDC_IDC_MSMT_BATTERY_STATUS: NORMAL
MDC_IDC_MSMT_LEADCHNL_RA_IMPEDANCE_VALUE: 526 OHM
MDC_IDC_MSMT_LEADCHNL_RA_PACING_THRESHOLD_AMPLITUDE: 0.6 V
MDC_IDC_MSMT_LEADCHNL_RA_PACING_THRESHOLD_PULSEWIDTH: 0.4 MS
MDC_IDC_MSMT_LEADCHNL_RV_IMPEDANCE_VALUE: 624 OHM
MDC_IDC_MSMT_LEADCHNL_RV_PACING_THRESHOLD_AMPLITUDE: 1.4 V
MDC_IDC_MSMT_LEADCHNL_RV_PACING_THRESHOLD_PULSEWIDTH: 0.4 MS
MDC_IDC_PG_IMPLANT_DTM: NORMAL
MDC_IDC_PG_MFG: NORMAL
MDC_IDC_PG_MODEL: NORMAL
MDC_IDC_PG_SERIAL: NORMAL
MDC_IDC_PG_TYPE: NORMAL
MDC_IDC_SESS_CLINIC_NAME: NORMAL
MDC_IDC_SESS_DTM: NORMAL
MDC_IDC_SESS_TYPE: NORMAL
MDC_IDC_SET_BRADY_AT_MODE_SWITCH_MODE: NORMAL
MDC_IDC_SET_BRADY_AT_MODE_SWITCH_RATE: 170 {BEATS}/MIN
MDC_IDC_SET_BRADY_LOWRATE: 60 {BEATS}/MIN
MDC_IDC_SET_BRADY_MAX_SENSOR_RATE: 130 {BEATS}/MIN
MDC_IDC_SET_BRADY_MAX_TRACKING_RATE: 130 {BEATS}/MIN
MDC_IDC_SET_BRADY_MODE: NORMAL
MDC_IDC_SET_BRADY_PAV_DELAY_HIGH: 150 MS
MDC_IDC_SET_BRADY_PAV_DELAY_LOW: 200 MS
MDC_IDC_SET_BRADY_SAV_DELAY_HIGH: 150 MS
MDC_IDC_SET_BRADY_SAV_DELAY_LOW: 200 MS
MDC_IDC_SET_LEADCHNL_RA_PACING_AMPLITUDE: 2 V
MDC_IDC_SET_LEADCHNL_RA_PACING_CAPTURE_MODE: NORMAL
MDC_IDC_SET_LEADCHNL_RA_PACING_POLARITY: NORMAL
MDC_IDC_SET_LEADCHNL_RA_PACING_PULSEWIDTH: 0.4 MS
MDC_IDC_SET_LEADCHNL_RA_SENSING_ADAPTATION_MODE: NORMAL
MDC_IDC_SET_LEADCHNL_RA_SENSING_POLARITY: NORMAL
MDC_IDC_SET_LEADCHNL_RA_SENSING_SENSITIVITY: 0.25 MV
MDC_IDC_SET_LEADCHNL_RV_PACING_AMPLITUDE: 1.8 V
MDC_IDC_SET_LEADCHNL_RV_PACING_CAPTURE_MODE: NORMAL
MDC_IDC_SET_LEADCHNL_RV_PACING_POLARITY: NORMAL
MDC_IDC_SET_LEADCHNL_RV_PACING_PULSEWIDTH: 0.4 MS
MDC_IDC_SET_LEADCHNL_RV_SENSING_ADAPTATION_MODE: NORMAL
MDC_IDC_SET_LEADCHNL_RV_SENSING_POLARITY: NORMAL
MDC_IDC_SET_LEADCHNL_RV_SENSING_SENSITIVITY: 1.5 MV
MDC_IDC_SET_ZONE_DETECTION_INTERVAL: 375 MS
MDC_IDC_SET_ZONE_STATUS: NORMAL
MDC_IDC_SET_ZONE_TYPE: NORMAL
MDC_IDC_SET_ZONE_VENDOR_TYPE: NORMAL
MDC_IDC_STAT_AT_BURDEN_PERCENT: 1 %
MDC_IDC_STAT_AT_DTM_END: NORMAL
MDC_IDC_STAT_AT_DTM_START: NORMAL
MDC_IDC_STAT_BRADY_DTM_END: NORMAL
MDC_IDC_STAT_BRADY_DTM_START: NORMAL
MDC_IDC_STAT_BRADY_RA_PERCENT_PACED: 5 %
MDC_IDC_STAT_BRADY_RV_PERCENT_PACED: 49 %
MDC_IDC_STAT_EPISODE_RECENT_COUNT: 0
MDC_IDC_STAT_EPISODE_RECENT_COUNT: 0
MDC_IDC_STAT_EPISODE_RECENT_COUNT: 1
MDC_IDC_STAT_EPISODE_RECENT_COUNT: 1
MDC_IDC_STAT_EPISODE_RECENT_COUNT_DTM_END: NORMAL
MDC_IDC_STAT_EPISODE_RECENT_COUNT_DTM_START: NORMAL
MDC_IDC_STAT_EPISODE_TYPE: NORMAL
MDC_IDC_STAT_EPISODE_VENDOR_TYPE: NORMAL

## 2025-06-30 PROCEDURE — 93294 REM INTERROG EVL PM/LDLS PM: CPT | Performed by: INTERNAL MEDICINE

## 2025-06-30 PROCEDURE — 93296 REM INTERROG EVL PM/IDS: CPT | Performed by: INTERNAL MEDICINE

## 2025-06-30 NOTE — TELEPHONE ENCOUNTER
Reviewed transmission with patient who is unable to correlate symptoms. Also reviewed need for coronary CTA. Will route message to . Alejandrina Mills RN

## 2025-06-30 NOTE — TELEPHONE ENCOUNTER
"----- Message from Nurys DANILO Husain sent at 6/30/2025  8:58 AM CDT -----  Regarding: device RN review  Encounter Type: Routine remote pacemaker transmission.  Device: [employer] Accolade MRI (D).   Pacing % /Programmed: AP 5%,  49% at DDD 60/130 ppm.  Lead(s): stable.  Battery longevity: 9yrs, 6mo estimated.  Presenting: Sinus 65-70 bpm.   Atrial high rates: since 3/20/25; one mode switch episode <1min, appears to be atrial tachy arrhythmia.  Anticoagulant: None.  Ventricular High rates: since 3/20/25; One ventricular high rate episode on 6/15/25 12:30PM, appears to be NSVT 20bts avg 175-180 bpm, duration ~7 seconds.   Comments: Normal device function.   Plan: Next remote check scheduled for 10/13/25. Routed to device RN for review of VHR episode. TASIA Husain, Device Specialist      NSVT noted, last EF 79% per \"negative\" stress test on 2/21/24. Per last OV notes by Dr. Alvarez, a Coronary CTA was recommended, however, does not appear that this was completed yet. Not currently on beta-blocker.       Call placed to patient to review findings of NSVT, check for any correlating symptoms. Will also review prior recommendations for Coronary CTA. No answer LVMx1.    Teena Oneal, RN          "

## 2025-07-15 RX ORDER — DILTIAZEM HYDROCHLORIDE 5 MG/ML
10-15 INJECTION INTRAVENOUS
Status: ACTIVE | OUTPATIENT
Start: 2025-07-15

## 2025-07-15 RX ORDER — METOPROLOL TARTRATE 1 MG/ML
5-20 INJECTION, SOLUTION INTRAVENOUS
Status: ACTIVE | OUTPATIENT
Start: 2025-07-15

## 2025-07-15 RX ORDER — NITROGLYCERIN 0.4 MG/1
0.8 TABLET SUBLINGUAL
Status: ACTIVE | OUTPATIENT
Start: 2025-07-15

## 2025-07-17 ENCOUNTER — HOSPITAL ENCOUNTER (OUTPATIENT)
Dept: CT IMAGING | Facility: CLINIC | Age: 70
End: 2025-07-17
Attending: INTERNAL MEDICINE
Payer: COMMERCIAL

## 2025-07-17 VITALS — SYSTOLIC BLOOD PRESSURE: 165 MMHG | DIASTOLIC BLOOD PRESSURE: 79 MMHG

## 2025-07-17 DIAGNOSIS — I25.119 CORONARY ARTERY DISEASE INVOLVING NATIVE CORONARY ARTERY OF NATIVE HEART WITH ANGINA PECTORIS: ICD-10-CM

## 2025-07-17 DIAGNOSIS — R07.9 CHEST PAIN: ICD-10-CM

## 2025-07-17 LAB
BSA FOR ECHO PROCEDURE: 1.78 M2
CCTA ASCENDING AORTA: 3
CCTA SINUS: 3
CREAT BLD-MCNC: 1.6 MG/DL (ref 0.5–1)
EGFRCR SERPLBLD CKD-EPI 2021: 34 ML/MIN/1.73M2

## 2025-07-17 PROCEDURE — 250N000011 HC RX IP 250 OP 636: Performed by: INTERNAL MEDICINE

## 2025-07-17 PROCEDURE — 250N000013 HC RX MED GY IP 250 OP 250 PS 637: Performed by: INTERNAL MEDICINE

## 2025-07-17 PROCEDURE — 75574 CT ANGIO HRT W/3D IMAGE: CPT

## 2025-07-17 PROCEDURE — 250N000009 HC RX 250: Performed by: INTERNAL MEDICINE

## 2025-07-17 PROCEDURE — 82565 ASSAY OF CREATININE: CPT

## 2025-07-17 RX ORDER — IOPAMIDOL 755 MG/ML
100 INJECTION, SOLUTION INTRAVASCULAR ONCE
Status: COMPLETED | OUTPATIENT
Start: 2025-07-17 | End: 2025-07-17

## 2025-07-17 RX ADMIN — NITROGLYCERIN 0.8 MG: 0.4 TABLET SUBLINGUAL at 07:04

## 2025-07-17 RX ADMIN — IOPAMIDOL 75 ML: 755 INJECTION, SOLUTION INTRAVENOUS at 07:13

## 2025-07-17 RX ADMIN — METOPROLOL TARTRATE 5 MG: 5 INJECTION INTRAVENOUS at 07:05

## (undated) DEVICE — ENDO TROCAR SLEEVE KII Z-THREADED 05X100MM CTS02

## (undated) DEVICE — TUBING SMOKE EVAC PNEUMOCLEAR HIGH FLOW 0620050250

## (undated) DEVICE — CABLE PACING ALLIGATOR CLIP 301-CG

## (undated) DEVICE — PLATE GROUNDING ADULT W/CORD 9165L

## (undated) DEVICE — Device

## (undated) DEVICE — DRSG STERI STRIP 1/2X4" R1547

## (undated) DEVICE — BASIN EMESIS STERILE  SSK9005A

## (undated) DEVICE — SUCTION MANIFOLD NEPTUNE 2 SYS 1 PORT 702-025-000

## (undated) DEVICE — NDL INSUFFLATION 13GA 120MM C2201

## (undated) DEVICE — PACK PCMKR PERM SRG PROC LF SAN32PC573

## (undated) DEVICE — RAD INTRODUCER KIT MICRO 5FRX10CM .018 NITINOL G/W

## (undated) DEVICE — GLOVE BIOGEL PI ORTHOPRO SZ 7.5 47675

## (undated) DEVICE — ENDO SHEARS RENEW LAP ENDOCUT SCISSOR TIP 16.5MM 3142

## (undated) DEVICE — SOL NACL 0.9% IRRIG 1000ML BOTTLE 2F7124

## (undated) DEVICE — PREP CHLORAPREP 26ML TINTED HI-LITE ORANGE 930815

## (undated) DEVICE — SOL WATER IRRIG 1000ML BOTTLE 2F7114

## (undated) DEVICE — BANDAGE ADH LF 1X3 ABN3100A

## (undated) DEVICE — DECANTER VIAL 2006S

## (undated) DEVICE — CLIP LIGACLIP LG YELLOW LT400

## (undated) DEVICE — SHEATH PRELUDE SNAP 13CM 6FR

## (undated) DEVICE — ENDO TROCAR FIRST ENTRY KII FIOS Z-THRD 05X100MM CTF03

## (undated) DEVICE — MEDITRACE MULTIFUNTION ADULT RADIOTRANSPARENT ELECTRODE FOR ZOLL

## (undated) DEVICE — CUSTOM PACK LAP CHOLE SBA5BLCHEA

## (undated) DEVICE — SU MONOCRYL+ 4-0 18IN PS2 UND MCP496G

## (undated) DEVICE — BLADE KNIFE SURG 11 371111

## (undated) DEVICE — ENDO TROCAR FIRST ENTRY KII FIOS Z-THRD 11X100MM CTF33

## (undated) RX ORDER — FENTANYL CITRATE 50 UG/ML
INJECTION, SOLUTION INTRAMUSCULAR; INTRAVENOUS
Status: DISPENSED
Start: 2023-06-05

## (undated) RX ORDER — FENTANYL CITRATE-0.9 % NACL/PF 10 MCG/ML
PLASTIC BAG, INJECTION (ML) INTRAVENOUS
Status: DISPENSED
Start: 2023-06-05

## (undated) RX ORDER — FENTANYL CITRATE 50 UG/ML
INJECTION, SOLUTION INTRAMUSCULAR; INTRAVENOUS
Status: DISPENSED
Start: 2021-02-08

## (undated) RX ORDER — BUPIVACAINE HYDROCHLORIDE 2.5 MG/ML
INJECTION, SOLUTION INFILTRATION; PERINEURAL
Status: DISPENSED
Start: 2023-06-05

## (undated) RX ORDER — PROPOFOL 10 MG/ML
INJECTION, EMULSION INTRAVENOUS
Status: DISPENSED
Start: 2023-06-05

## (undated) RX ORDER — LIDOCAINE HYDROCHLORIDE 10 MG/ML
INJECTION, SOLUTION EPIDURAL; INFILTRATION; INTRACAUDAL; PERINEURAL
Status: DISPENSED
Start: 2021-02-08

## (undated) RX ORDER — VASOPRESSIN IN 0.9 % NACL 2 UNIT/2ML
SYRINGE (ML) INTRAVENOUS
Status: DISPENSED
Start: 2023-06-05

## (undated) RX ORDER — BUPIVACAINE HYDROCHLORIDE 2.5 MG/ML
INJECTION, SOLUTION EPIDURAL; INFILTRATION; INTRACAUDAL
Status: DISPENSED
Start: 2021-02-08

## (undated) RX ORDER — ONDANSETRON 2 MG/ML
INJECTION INTRAMUSCULAR; INTRAVENOUS
Status: DISPENSED
Start: 2023-06-05